# Patient Record
Sex: MALE | Race: WHITE | NOT HISPANIC OR LATINO | Employment: OTHER | URBAN - METROPOLITAN AREA
[De-identification: names, ages, dates, MRNs, and addresses within clinical notes are randomized per-mention and may not be internally consistent; named-entity substitution may affect disease eponyms.]

---

## 2017-01-18 ENCOUNTER — APPOINTMENT (OUTPATIENT)
Dept: GENERAL RADIOLOGY | Facility: HOSPITAL | Age: 72
End: 2017-01-18

## 2017-01-18 ENCOUNTER — HOSPITAL ENCOUNTER (EMERGENCY)
Facility: HOSPITAL | Age: 72
Discharge: HOME | End: 2017-01-18
Attending: EMERGENCY MEDICINE | Admitting: EMERGENCY MEDICINE

## 2017-01-18 VITALS
SYSTOLIC BLOOD PRESSURE: 120 MMHG | WEIGHT: 265 LBS | OXYGEN SATURATION: 96 % | HEIGHT: 75 IN | HEART RATE: 79 BPM | RESPIRATION RATE: 22 BRPM | TEMPERATURE: 98 F | BODY MASS INDEX: 32.95 KG/M2 | DIASTOLIC BLOOD PRESSURE: 63 MMHG

## 2017-01-18 DIAGNOSIS — I48.0 PAROXYSMAL ATRIAL FIBRILLATION (HCC): Primary | ICD-10-CM

## 2017-01-18 LAB
ALBUMIN SERPL-MCNC: 4 G/DL (ref 3.2–4.8)
ALBUMIN/GLOB SERPL: 1.3 G/DL (ref 1.5–2.5)
ALP SERPL-CCNC: 78 U/L (ref 25–100)
ALT SERPL W P-5'-P-CCNC: 31 U/L (ref 7–40)
ANION GAP SERPL CALCULATED.3IONS-SCNC: 7 MMOL/L (ref 3–11)
AST SERPL-CCNC: 28 U/L (ref 0–33)
BASOPHILS # BLD AUTO: 0.02 10*3/MM3 (ref 0–0.2)
BASOPHILS NFR BLD AUTO: 0.1 % (ref 0–1)
BILIRUB SERPL-MCNC: 1.8 MG/DL (ref 0.3–1.2)
BNP SERPL-MCNC: 204 PG/ML (ref 0–100)
BUN BLD-MCNC: 16 MG/DL (ref 9–23)
BUN/CREAT SERPL: 16 (ref 7–25)
CALCIUM SPEC-SCNC: 9.4 MG/DL (ref 8.7–10.4)
CHLORIDE SERPL-SCNC: 105 MMOL/L (ref 99–109)
CO2 SERPL-SCNC: 30 MMOL/L (ref 20–31)
CREAT BLD-MCNC: 1 MG/DL (ref 0.6–1.3)
DEPRECATED RDW RBC AUTO: 45.5 FL (ref 37–54)
EOSINOPHIL # BLD AUTO: 0.1 10*3/MM3 (ref 0.1–0.3)
EOSINOPHIL NFR BLD AUTO: 0.7 % (ref 0–3)
ERYTHROCYTE [DISTWIDTH] IN BLOOD BY AUTOMATED COUNT: 13.1 % (ref 11.3–14.5)
GFR SERPL CREATININE-BSD FRML MDRD: 74 ML/MIN/1.73
GLOBULIN UR ELPH-MCNC: 3 GM/DL
GLUCOSE BLD-MCNC: 98 MG/DL (ref 70–100)
HCT VFR BLD AUTO: 44.7 % (ref 38.9–50.9)
HGB BLD-MCNC: 15.3 G/DL (ref 13.1–17.5)
HOLD SPECIMEN: NORMAL
HOLD SPECIMEN: NORMAL
IMM GRANULOCYTES # BLD: 0.02 10*3/MM3 (ref 0–0.03)
IMM GRANULOCYTES NFR BLD: 0.1 % (ref 0–0.6)
LYMPHOCYTES # BLD AUTO: 0.81 10*3/MM3 (ref 0.6–4.8)
LYMPHOCYTES NFR BLD AUTO: 5.8 % (ref 24–44)
MAGNESIUM SERPL-MCNC: 2.1 MG/DL (ref 1.3–2.7)
MCH RBC QN AUTO: 32.2 PG (ref 27–31)
MCHC RBC AUTO-ENTMCNC: 34.2 G/DL (ref 32–36)
MCV RBC AUTO: 94.1 FL (ref 80–99)
MONOCYTES # BLD AUTO: 1.39 10*3/MM3 (ref 0–1)
MONOCYTES NFR BLD AUTO: 10 % (ref 0–12)
NEUTROPHILS # BLD AUTO: 11.6 10*3/MM3 (ref 1.5–8.3)
NEUTROPHILS NFR BLD AUTO: 83.3 % (ref 41–71)
PLATELET # BLD AUTO: 183 10*3/MM3 (ref 150–450)
PMV BLD AUTO: 10.1 FL (ref 6–12)
POTASSIUM BLD-SCNC: 4.1 MMOL/L (ref 3.5–5.5)
PROT SERPL-MCNC: 7 G/DL (ref 5.7–8.2)
RBC # BLD AUTO: 4.75 10*6/MM3 (ref 4.2–5.76)
SODIUM BLD-SCNC: 142 MMOL/L (ref 132–146)
TROPONIN I SERPL-MCNC: 0.02 NG/ML (ref 0–0.07)
WBC NRBC COR # BLD: 13.94 10*3/MM3 (ref 3.5–10.8)
WHOLE BLOOD HOLD SPECIMEN: NORMAL
WHOLE BLOOD HOLD SPECIMEN: NORMAL

## 2017-01-18 PROCEDURE — 93005 ELECTROCARDIOGRAM TRACING: CPT | Performed by: EMERGENCY MEDICINE

## 2017-01-18 PROCEDURE — 84484 ASSAY OF TROPONIN QUANT: CPT

## 2017-01-18 PROCEDURE — 80053 COMPREHEN METABOLIC PANEL: CPT | Performed by: EMERGENCY MEDICINE

## 2017-01-18 PROCEDURE — 83735 ASSAY OF MAGNESIUM: CPT | Performed by: EMERGENCY MEDICINE

## 2017-01-18 PROCEDURE — 71010 HC CHEST PA OR AP: CPT

## 2017-01-18 PROCEDURE — 92960 CARDIOVERSION ELECTRIC EXT: CPT | Performed by: EMERGENCY MEDICINE

## 2017-01-18 PROCEDURE — 36415 COLL VENOUS BLD VENIPUNCTURE: CPT

## 2017-01-18 PROCEDURE — 83880 ASSAY OF NATRIURETIC PEPTIDE: CPT | Performed by: EMERGENCY MEDICINE

## 2017-01-18 PROCEDURE — 99284 EMERGENCY DEPT VISIT MOD MDM: CPT

## 2017-01-18 PROCEDURE — 25010000002 PROPOFOL 10 MG/ML EMULSION: Performed by: EMERGENCY MEDICINE

## 2017-01-18 PROCEDURE — 85025 COMPLETE CBC W/AUTO DIFF WBC: CPT | Performed by: EMERGENCY MEDICINE

## 2017-01-18 RX ORDER — SODIUM CHLORIDE 0.9 % (FLUSH) 0.9 %
10 SYRINGE (ML) INJECTION AS NEEDED
Status: DISCONTINUED | OUTPATIENT
Start: 2017-01-18 | End: 2017-01-18 | Stop reason: HOSPADM

## 2017-01-18 RX ORDER — PROPOFOL 10 MG/ML
40 VIAL (ML) INTRAVENOUS ONCE
Status: COMPLETED | OUTPATIENT
Start: 2017-01-18 | End: 2017-01-18

## 2017-01-18 RX ADMIN — PROPOFOL 40 MG: 10 INJECTION, EMULSION INTRAVENOUS at 14:51

## 2017-01-18 NOTE — ED NOTES
CONSENT SIGNED BY FAMILY AND ON CHART. PT PLACED ON CAPNOGRAPHY. 1 LITER NORMAL SALINE HANGING AT BEDSIDE. ZOLL IN ROOM AND CARDIO PADS ATTACHED. DR SINCLAIR NOTIFIED.              Elif Fonseca RN  01/18/17 6350

## 2017-01-18 NOTE — ED PROVIDER NOTES
Subjective   HPI Comments: Mr. Arellano is a 71 y.o male who presents to the ED with an irregular heart beat starting last night. Pt notes that he became SOA shortly after waking up and noticed that his CPAP machine was disconnected . He went to his PCP earlier today and was told to come to the ED because he was in afib. He also complains of a cough starting yesterday but denies any fever, chills, congestions, N/V/D or any other acute sx at this time. He notes that these sx are consistent with episodes of afib in the past. He is currently on Xarelto.     Patient is a 71 y.o. male presenting with palpitations.   History provided by:  Patient  Palpitations   Palpitations quality:  Irregular  Timing:  Constant  Progression:  Unchanged  Chronicity:  New  Relieved by:  None tried  Worsened by:  Nothing  Ineffective treatments:  None tried  Associated symptoms: cough and shortness of breath    Associated symptoms: no nausea and no vomiting    Risk factors: hx of atrial fibrillation        Review of Systems   Constitutional: Negative for chills and fever.   HENT: Negative for congestion and rhinorrhea.    Respiratory: Positive for cough and shortness of breath.    Cardiovascular: Positive for palpitations.   Gastrointestinal: Negative for abdominal pain, diarrhea, nausea and vomiting.   All other systems reviewed and are negative.      Past Medical History   Diagnosis Date   • Benign hypertension    • Lower extremity edema    • Mild obesity    • PARKER (obstructive sleep apnea)      requiring CPAP   • Osteoarthritis    • Paroxysmal atrial fibrillation    • Tachy-krishna syndrome        Allergies   Allergen Reactions   • Naproxen      Patient states he has made changes on that       Past Surgical History   Procedure Laterality Date   • Cardioversion     • Nephrolithotripsy percutaneous         Family History   Problem Relation Age of Onset   • Hypertension Other    • Cancer Sister        Social History     Social History   •  Marital status:      Spouse name: N/A   • Number of children: N/A   • Years of education: N/A     Social History Main Topics   • Smoking status: Former Smoker   • Smokeless tobacco: None   • Alcohol use No   • Drug use: No   • Sexual activity: Defer     Other Topics Concern   • None     Social History Narrative         Objective   Physical Exam   Constitutional: He is oriented to person, place, and time. He appears well-developed and well-nourished. No distress.   HENT:   Head: Normocephalic and atraumatic.   Eyes: Conjunctivae are normal.   Neck: Normal range of motion. Neck supple. JVD (Mild) present.   Cardiovascular: Normal heart sounds and intact distal pulses.  An irregularly irregular rhythm present. Tachycardia present.    Pulmonary/Chest: Effort normal. No respiratory distress. He has rales (Left base).   Abdominal: Soft. There is no tenderness.   Musculoskeletal: Normal range of motion. He exhibits no edema.   Neurological: He is alert and oriented to person, place, and time.   Skin: Skin is warm and dry.   Psychiatric: He has a normal mood and affect. His behavior is normal.   Nursing note and vitals reviewed.      Electrical Cardioversion  Date/Time: 1/18/2017 2:31 PM  Performed by: MANUELA SINCLAIR  Authorized by: MANUELA SINCLAIR   Consent: Verbal consent obtained. Written consent obtained.  Risks and benefits: risks, benefits and alternatives were discussed  Consent given by: patient  Patient understanding: patient states understanding of the procedure being performed  Patient consent: the patient's understanding of the procedure matches consent given  Procedure consent: procedure consent matches procedure scheduled  Relevant documents: relevant documents present and verified  Imaging studies: imaging studies available  Required items: required blood products, implants, devices, and special equipment available  Patient identity confirmed: verbally with patient  Sedation:  Patient sedated:  yes  Sedatives: propofol  Sedation start date/time: 1/18/2017 2:48 PM  Vitals: Vital signs were monitored during sedation.    Cardioversion basis: elective  Pre-procedure rhythm: atrial fibrillation  Patient position: patient was placed in a supine position  Electrodes: pads  Electrodes placed: anterior-posterior  Number of attempts: 2  Attempt 1 mode: synchronous  Attempt 1 waveform: biphasic  Attempt 1 shock (in Joules): 120  Attempt 1 outcome: no change in rhythm  Attempt 2 mode: synchronous  Attempt 2 shock (in Joules): 150  Attempt 2 outcome: conversion to normal sinus rhythm  Post-procedure rhythm: normal sinus rhythm  Complications: no complications  Patient tolerance: Patient tolerated the procedure well with no immediate complications               ED Course  ED Course   Comment By Time   I spoke with Mr. Arellano and his wife.  I have paged Dr. WYATT who is on-call for cardiology Hong Rodriguez MD 01/18 1325   I spoke with Dr. Wyatt whp is going to call their office and have them call me back Hong Rodriguez MD 01/18 1331   Dr. Wyatt asked that I cardiovert Mr. Arellano.  I spoke with Mr. Arellano his wife about sedation and cardioversion, I discussed the risks of that.  They wish to proceed. Hong Rodriguez MD 01/18 1405   Mr. Arellano is now dressed awake and alert sitting on the end of his bed telling me he's ready to go home. Hong Rodriguez MD 01/18 1551                     MDM  Number of Diagnoses or Management Options  Paroxysmal atrial fibrillation: new and requires workup     Amount and/or Complexity of Data Reviewed  Clinical lab tests: ordered and reviewed  Tests in the radiology section of CPT®: ordered and reviewed  Review and summarize past medical records: yes  Discuss the patient with other providers: yes    Patient Progress  Patient progress: improved      Final diagnoses:   Paroxysmal atrial fibrillation       Documentation assistance provided by yury SAWANT.  Information  recorded by the scribe was done at my direction and has been verified and validated by me.     Tahir Mary  01/18/17 1219       Tahir Mary  01/18/17 1195       Hong Rodriguez MD  01/18/17 7678

## 2017-01-30 ENCOUNTER — OFFICE VISIT (OUTPATIENT)
Dept: CARDIOLOGY | Facility: CLINIC | Age: 72
End: 2017-01-30

## 2017-01-30 VITALS
HEART RATE: 55 BPM | BODY MASS INDEX: 32.08 KG/M2 | HEIGHT: 75 IN | SYSTOLIC BLOOD PRESSURE: 130 MMHG | WEIGHT: 258 LBS | DIASTOLIC BLOOD PRESSURE: 58 MMHG

## 2017-01-30 DIAGNOSIS — I48.0 PAROXYSMAL ATRIAL FIBRILLATION (HCC): ICD-10-CM

## 2017-01-30 DIAGNOSIS — I49.5 TACHY-BRADY SYNDROME (HCC): ICD-10-CM

## 2017-01-30 DIAGNOSIS — R60.0 BILATERAL EDEMA OF LOWER EXTREMITY: ICD-10-CM

## 2017-01-30 DIAGNOSIS — I10 BENIGN HYPERTENSION: Primary | ICD-10-CM

## 2017-01-30 PROCEDURE — 93000 ELECTROCARDIOGRAM COMPLETE: CPT | Performed by: PHYSICIAN ASSISTANT

## 2017-01-30 PROCEDURE — 99213 OFFICE O/P EST LOW 20 MIN: CPT | Performed by: PHYSICIAN ASSISTANT

## 2017-01-30 RX ORDER — GUAIFENESIN AND DEXTROMETHORPHAN HYDROBROMIDE 600; 30 MG/1; MG/1
1 TABLET, EXTENDED RELEASE ORAL 2 TIMES DAILY PRN
COMMUNITY
End: 2017-08-28

## 2017-01-30 NOTE — LETTER
January 30, 2017     Saleem Yang MD  [address] [place] [address] [address]  [place] [address]    Patient: Vlad Arellano   YOB: 1945   Date of Visit: 1/30/2017       Dear Dr. Celia MD:    Thank you for referring Vlad Arellano to me for evaluation. Below are the relevant portions of my assessment and plan of care.    If you have questions, please do not hesitate to call me. I look forward to following Vlad along with you.         Sincerely,        JAYDEN Mayorga        CC: No Recipients  JYADEN Mayorga  1/30/2017  9:42 AM  Signed       [facility] - Office Note  Vlad Arellano         [address] [place] [address]  1945   [phone] (home) [phone] (work)     LOCATION:  [facility].  Visit Type: Follow Up.    PCP:  Saleem Yang MD    01/30/17   Vlad Arellano is a 71 y.o.  male  retired.      Chief Complaint: Follow-up on paroxysmal atrial fibrillation, htn.  PROBLEM LIST:  1. Paroxysmal atrial fibrillation:  a. Diagnosed 4 years ago with external cardioversion x3.  b. CHADS-Vasc = 2.  On Xarelto.  c. Most recent external cardioversion on Multaq, 04/21/2016. Successful conversion to sinus rhythm. Cessation of Multaq and initiation of sotalol with hospitalization.  d. ER visit 1/18/17 for AFib with RVR.  2. Tachy-krishna syndrome.   3. Benign hypertension.   4. Lower extremity edema.   5. Obstructive sleep apnea requiring CPAP.   6. Osteoarthritis.   7. Mild obesity.   8. Family history of hypertension.   9. Surgical history:   a. External cardioversion x3.  b. Lithotripsy.      Allergies   Allergen Reactions   • Naproxen      Patient states he has made changes on that         Current Outpatient Prescriptions:   •  dronedarone (MULTAQ) 400 MG tablet, Take 400 mg by mouth 2 (two) times a day with meals., Disp: , Rfl:   •  guaifenesin-dextromethorphan (MUCINEX DM)  MG tablet sustained-release 12 hour tablet, Take 1  "tablet by mouth 2 (Two) Times a Day As Needed., Disp: , Rfl:   •  meloxicam (MOBIC) 7.5 MG tablet, Take 1 tablet by mouth daily., Disp: 30 tablet, Rfl: 6  •  naproxen sodium (ALEVE) 220 MG tablet, Take 220 mg by mouth 2 (two) times a day as needed for mild pain (1-3)., Disp: , Rfl:   •  rivaroxaban (XARELTO) 20 MG tablet, Take 20 mg by mouth daily., Disp: , Rfl:   •  torsemide (DEMADEX) 20 MG tablet, Take 20 mg by mouth daily., Disp: , Rfl:     HPI  Mr. Arellano is here today for routine follow-up on his atrial fibrillation, hypertension and tachybradycardia.Since his last visit with us, he was in the emergency room 2 weeks ago with recurrent A. fib.  He's been dealing with a cold for 2 weeks which includes upper respiratory issues such as a cough, sputum production and fatigue.  He also states the day he went in to see his primary care for antibiotics, he had woken up without his mask on.  He believes the combination of these 2 caused his A. fib.  He was asymptomatic otherwise.  He has been compliant with his medications including his multivitamins and Xarelto.  He denies any bleeding issues.  There've been no complaints of chest pain, no dizziness, no stroke or TIA-like symptoms.    The following portions of the patient's history were reviewed in the chart and updated as appropriate: allergies, current medications, past family history, past medical history, past social history, past surgical history and problem list.    Review of Systems   Respiratory: Positive for cough, shortness of breath and sputum production.    All other systems reviewed and are negative.        height is 75\" (190.5 cm) and weight is 258 lb (117 kg). His blood pressure is 130/58 and his pulse is 55.   Physical Exam   Constitutional: Vital signs are normal. He appears well-developed and well-nourished.   Cardiovascular: Normal rate, regular rhythm, S1 normal, S2 normal, normal heart sounds, intact distal pulses and normal pulses.  "   Pulmonary/Chest: Effort normal and breath sounds normal. He has no wheezes. He has no rhonchi. He has no rales.   Abdominal: Soft. Normal appearance and bowel sounds are normal. There is no hepatosplenomegaly.   Neurological: He is alert.           ECG 12 Lead  Date/Time: 1/30/2017 9:07 AM  Performed by: ZOYA ALARCON  Authorized by: ZOYA ALARCON   Comparison: compared with previous ECG from 1/18/2017  Comparison to previous ECG: Back in NSR.  Rhythm: sinus bradycardia  Rate: bradycardic  QRS axis: normal  Clinical impression: normal ECG             Assessment/ Plan   Benign hypertension:  Well controlled.  Continue current medical therapy.    Bilateral edema of lower extremity:  Stable and controlled.  Continue to monitor.    Tachy-krishna syndrome/PAF:   I tried to discuss alternative antiarrhythmics with Mr. Arellano along with possible future ablation.  At this time he is declining ablation but is open to information on other antiarrhythmics.  I do believe that a combination of his mask coming off and a cold exacerbated his A. fib.  He is maintaining sinus rhythm today with rate control.  I will continue with current medical regimen which includes Xarelto and Multaq.  Return to clinic 6 months with EKG or sooner when necessary.  If he has a recurrent event before then, I will schedule him an appointment with EP if Dr. Kwong has not returned.  Otherwise Dr. Kwong can discuss Tikosyn initiation in the future.      Zoya Alarcon PA-C

## 2017-01-30 NOTE — PROGRESS NOTES
[facility] - Office Note  Vlad Arellano         [address] [place] [address]  1945   [phone] (home) [phone] (work)     LOCATION:  [place] office.  Visit Type: Follow Up.    PCP:  Saleem Yang MD    01/30/17   Vlad Arellano is a 71 y.o.  male  retired.      Chief Complaint: Follow-up on paroxysmal atrial fibrillation, htn.  PROBLEM LIST:  1. Paroxysmal atrial fibrillation:  a. Diagnosed 4 years ago with external cardioversion x3.  b. CHADS-Vasc = 2.  On Xarelto.  c. Most recent external cardioversion on Multaq, 04/21/2016. Successful conversion to sinus rhythm. Cessation of Multaq and initiation of sotalol with hospitalization.  d. ER visit 1/18/17 for AFib with RVR.  2. Tachy-krishna syndrome.   3. Benign hypertension.   4. Lower extremity edema.   5. Obstructive sleep apnea requiring CPAP.   6. Osteoarthritis.   7. Mild obesity.   8. Family history of hypertension.   9. Surgical history:   a. External cardioversion x3.  b. Lithotripsy.      Allergies   Allergen Reactions   • Naproxen      Patient states he has made changes on that         Current Outpatient Prescriptions:   •  dronedarone (MULTAQ) 400 MG tablet, Take 400 mg by mouth 2 (two) times a day with meals., Disp: , Rfl:   •  guaifenesin-dextromethorphan (MUCINEX DM)  MG tablet sustained-release 12 hour tablet, Take 1 tablet by mouth 2 (Two) Times a Day As Needed., Disp: , Rfl:   •  meloxicam (MOBIC) 7.5 MG tablet, Take 1 tablet by mouth daily., Disp: 30 tablet, Rfl: 6  •  naproxen sodium (ALEVE) 220 MG tablet, Take 220 mg by mouth 2 (two) times a day as needed for mild pain (1-3)., Disp: , Rfl:   •  rivaroxaban (XARELTO) 20 MG tablet, Take 20 mg by mouth daily., Disp: , Rfl:   •  torsemide (DEMADEX) 20 MG tablet, Take 20 mg by mouth daily., Disp: , Rfl:     HPI  Mr. Arellano is here today for routine follow-up on his atrial fibrillation, hypertension and  "tachybradycardia.Since his last visit with us, he was in the emergency room 2 weeks ago with recurrent A. fib.  He's been dealing with a cold for 2 weeks which includes upper respiratory issues such as a cough, sputum production and fatigue.  He also states the day he went in to see his primary care for antibiotics, he had woken up without his mask on.  He believes the combination of these 2 caused his A. fib.  He was asymptomatic otherwise.  He has been compliant with his medications including his multivitamins and Xarelto.  He denies any bleeding issues.  There've been no complaints of chest pain, no dizziness, no stroke or TIA-like symptoms.    The following portions of the patient's history were reviewed in the chart and updated as appropriate: allergies, current medications, past family history, past medical history, past social history, past surgical history and problem list.    Review of Systems   Respiratory: Positive for cough, shortness of breath and sputum production.    All other systems reviewed and are negative.        height is 75\" (190.5 cm) and weight is 258 lb (117 kg). His blood pressure is 130/58 and his pulse is 55.   Physical Exam   Constitutional: Vital signs are normal. He appears well-developed and well-nourished.   Cardiovascular: Normal rate, regular rhythm, S1 normal, S2 normal, normal heart sounds, intact distal pulses and normal pulses.    Pulmonary/Chest: Effort normal and breath sounds normal. He has no wheezes. He has no rhonchi. He has no rales.   Abdominal: Soft. Normal appearance and bowel sounds are normal. There is no hepatosplenomegaly.   Neurological: He is alert.           ECG 12 Lead  Date/Time: 1/30/2017 9:07 AM  Performed by: TONE ALARCON  Authorized by: TONE ALARCON   Comparison: compared with previous ECG from 1/18/2017  Comparison to previous ECG: Back in NSR.  Rhythm: sinus bradycardia  Rate: bradycardic  QRS axis: normal  Clinical impression: normal " ECG             Assessment/ Plan   Benign hypertension:  Well controlled.  Continue current medical therapy.    Bilateral edema of lower extremity:  Stable and controlled.  Continue to monitor.    Tachy-krishna syndrome/PAF:   I tried to discuss alternative antiarrhythmics with Mr. Arellano along with possible future ablation.  At this time he is declining ablation but is open to information on other antiarrhythmics.  I do believe that a combination of his mask coming off and a cold exacerbated his A. fib.  He is maintaining sinus rhythm today with rate control.  I will continue with current medical regimen which includes Xarelto and Multaq.  Return to clinic 6 months with EKG or sooner when necessary.  If he has a recurrent event before then, I will schedule him an appointment with EP if Dr. Kwong has not returned.  Otherwise Dr. Kwong can discuss Tikosyn initiation in the future.      Zoya Lopes PA-C

## 2017-07-15 ENCOUNTER — APPOINTMENT (OUTPATIENT)
Dept: GENERAL RADIOLOGY | Facility: HOSPITAL | Age: 72
End: 2017-07-15

## 2017-07-15 ENCOUNTER — HOSPITAL ENCOUNTER (EMERGENCY)
Facility: HOSPITAL | Age: 72
Discharge: HOME | End: 2017-07-15
Attending: EMERGENCY MEDICINE | Admitting: EMERGENCY MEDICINE

## 2017-07-15 VITALS
SYSTOLIC BLOOD PRESSURE: 125 MMHG | OXYGEN SATURATION: 98 % | HEIGHT: 75 IN | DIASTOLIC BLOOD PRESSURE: 73 MMHG | HEART RATE: 66 BPM | BODY MASS INDEX: 31.58 KG/M2 | WEIGHT: 254 LBS | TEMPERATURE: 97.9 F | RESPIRATION RATE: 16 BRPM

## 2017-07-15 DIAGNOSIS — S80.811A LEG ABRASION, RIGHT, INITIAL ENCOUNTER: ICD-10-CM

## 2017-07-15 DIAGNOSIS — S00.01XA SCALP ABRASION, INITIAL ENCOUNTER: ICD-10-CM

## 2017-07-15 DIAGNOSIS — S80.01XA CONTUSION OF RIGHT KNEE, INITIAL ENCOUNTER: Primary | ICD-10-CM

## 2017-07-15 DIAGNOSIS — S83.8X1A SPRAIN OF OTHER LIGAMENT OF RIGHT KNEE, INITIAL ENCOUNTER: ICD-10-CM

## 2017-07-15 PROCEDURE — 90471 IMMUNIZATION ADMIN: CPT | Performed by: NURSE PRACTITIONER

## 2017-07-15 PROCEDURE — 90715 TDAP VACCINE 7 YRS/> IM: CPT | Performed by: NURSE PRACTITIONER

## 2017-07-15 PROCEDURE — 25010000002 TDAP 5-2.5-18.5 LF-MCG/0.5 SUSPENSION: Performed by: NURSE PRACTITIONER

## 2017-07-15 PROCEDURE — 73552 X-RAY EXAM OF FEMUR 2/>: CPT

## 2017-07-15 PROCEDURE — 73560 X-RAY EXAM OF KNEE 1 OR 2: CPT

## 2017-07-15 PROCEDURE — 99283 EMERGENCY DEPT VISIT LOW MDM: CPT

## 2017-07-15 RX ORDER — HYDROCODONE BITARTRATE AND ACETAMINOPHEN 5; 325 MG/1; MG/1
1-2 TABLET ORAL EVERY 6 HOURS PRN
Qty: 15 TABLET | Refills: 0 | Status: SHIPPED | OUTPATIENT
Start: 2017-07-15 | End: 2017-08-21 | Stop reason: HOSPADM

## 2017-07-15 RX ADMIN — TETANUS TOXOID, REDUCED DIPHTHERIA TOXOID AND ACELLULAR PERTUSSIS VACCINE, ADSORBED 0.5 ML: 5; 2.5; 8; 8; 2.5 SUSPENSION INTRAMUSCULAR at 10:04
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
## 2017-08-14 ENCOUNTER — TELEPHONE (OUTPATIENT)
Dept: CARDIOLOGY | Facility: CLINIC | Age: 72
End: 2017-08-14

## 2017-08-14 NOTE — TELEPHONE ENCOUNTER
([phone]) called and stated he plans on removing fluid from patients large hematoma on his knee in the next week. wants to know how long patient is to be off his xarelto. Called  back and told him patient can be off xarelto for 3 days per Yen Lopes request.  wanted a longer time for patient to be off xarelto. In clinic with Yen Lopes and Yen talked to  and told him it was ok to be off xarelto for 5 days before the procedure and patient could be on a baby asa while off the xarelto at  request. Also,  was going to start patient on Eliquis post procedure instead of xarelto.  was going to call patient and tell him this information along with taking care of the eliquis post procedure per Yen Lopes's conversation with .

## 2017-08-14 NOTE — TELEPHONE ENCOUNTER
Patient called and wanted to know if he needed to stop his xarelto tonight. Instructed him that  called earlier today and discussed his upcoming procedure and xarelto therapy with Yen CARPENTER and that he would be calling the patient with the specific instructions. Verbalized understanding.

## 2017-08-15 ENCOUNTER — TELEPHONE (OUTPATIENT)
Dept: CARDIOLOGY | Facility: CLINIC | Age: 72
End: 2017-08-15

## 2017-08-15 NOTE — TELEPHONE ENCOUNTER
Ramona from  office called and stated she called patient with pre procedure instructions as discussed with Yen CARPENTER and  yesterday. Patients spouse ,Rafaela was not receptive to her instructions. Ramona wants a RX faxed to [phone] stating to hold  xarelto 5 days before procedure, exact date when to start baby asa until procedure and to  starting eliquis after procedure instead of xarelto. Procedure  is scheduled for August 24,2017. Ramona's # [phone].Please advise.

## 2017-08-17 NOTE — TELEPHONE ENCOUNTER
Called Ramona at 's office and told her instructions to give patient from Yen Lopes PA were faxed per her request.

## 2017-08-17 NOTE — TELEPHONE ENCOUNTER
That's fine.  Write order to hold per our instructions.  ASA to start day after stopping Xarelto.  Start Eliquis 5mg po bid immediately following surgery - I'm sure DR. SCHMID will advise on this so the Eliquis should start per his instructions.  I would write that on the note.    i've attached Kaylynn because this is her uncle.

## 2017-08-18 ENCOUNTER — ANESTHESIA (OUTPATIENT)
Dept: PERIOP | Facility: HOSPITAL | Age: 72
End: 2017-08-18

## 2017-08-18 ENCOUNTER — ANESTHESIA EVENT (OUTPATIENT)
Dept: PERIOP | Facility: HOSPITAL | Age: 72
End: 2017-08-18

## 2017-08-18 ENCOUNTER — HOSPITAL ENCOUNTER (INPATIENT)
Facility: HOSPITAL | Age: 72
LOS: 3 days | Discharge: HOME | End: 2017-08-21
Attending: ORTHOPAEDIC SURGERY | Admitting: ORTHOPAEDIC SURGERY

## 2017-08-18 DIAGNOSIS — Z98.890 S/P DEBRIDEMENT: Primary | ICD-10-CM

## 2017-08-18 DIAGNOSIS — R52 PAIN: ICD-10-CM

## 2017-08-18 PROBLEM — S70.11XA HEMATOMA OF RIGHT THIGH: Status: ACTIVE | Noted: 2017-08-18

## 2017-08-18 LAB
ANION GAP SERPL CALCULATED.3IONS-SCNC: 9 MMOL/L (ref 3–11)
BUN BLD-MCNC: 17 MG/DL (ref 9–23)
BUN/CREAT SERPL: 21.3 (ref 7–25)
CALCIUM SPEC-SCNC: 8.8 MG/DL (ref 8.7–10.4)
CHLORIDE SERPL-SCNC: 100 MMOL/L (ref 99–109)
CO2 SERPL-SCNC: 27 MMOL/L (ref 20–31)
CREAT BLD-MCNC: 0.8 MG/DL (ref 0.6–1.3)
DEPRECATED RDW RBC AUTO: 46.8 FL (ref 37–54)
ERYTHROCYTE [DISTWIDTH] IN BLOOD BY AUTOMATED COUNT: 13.4 % (ref 11.3–14.5)
GFR SERPL CREATININE-BSD FRML MDRD: 95 ML/MIN/1.73
GLUCOSE BLD-MCNC: 93 MG/DL (ref 70–100)
HCT VFR BLD AUTO: 36.3 % (ref 38.9–50.9)
HGB BLD-MCNC: 12 G/DL (ref 13.1–17.5)
MCH RBC QN AUTO: 31.7 PG (ref 27–31)
MCHC RBC AUTO-ENTMCNC: 33.1 G/DL (ref 32–36)
MCV RBC AUTO: 95.8 FL (ref 80–99)
PLATELET # BLD AUTO: 199 10*3/MM3 (ref 150–450)
PMV BLD AUTO: 9 FL (ref 6–12)
POTASSIUM BLD-SCNC: 3.9 MMOL/L (ref 3.5–5.5)
RBC # BLD AUTO: 3.79 10*6/MM3 (ref 4.2–5.76)
SODIUM BLD-SCNC: 136 MMOL/L (ref 132–146)
WBC NRBC COR # BLD: 6.73 10*3/MM3 (ref 3.5–10.8)

## 2017-08-18 PROCEDURE — 25010000002 PROPOFOL 10 MG/ML EMULSION: Performed by: NURSE ANESTHETIST, CERTIFIED REGISTERED

## 2017-08-18 PROCEDURE — 87147 CULTURE TYPE IMMUNOLOGIC: CPT | Performed by: ORTHOPAEDIC SURGERY

## 2017-08-18 PROCEDURE — 87176 TISSUE HOMOGENIZATION CULTR: CPT | Performed by: ORTHOPAEDIC SURGERY

## 2017-08-18 PROCEDURE — 25010000003 CEFAZOLIN IN DEXTROSE 2-4 GM/100ML-% SOLUTION: Performed by: NURSE ANESTHETIST, CERTIFIED REGISTERED

## 2017-08-18 PROCEDURE — 93005 ELECTROCARDIOGRAM TRACING: CPT | Performed by: ORTHOPAEDIC SURGERY

## 2017-08-18 PROCEDURE — 87070 CULTURE OTHR SPECIMN AEROBIC: CPT | Performed by: ORTHOPAEDIC SURGERY

## 2017-08-18 PROCEDURE — 0JDN0ZZ EXTRACTION OF RIGHT LOWER LEG SUBCUTANEOUS TISSUE AND FASCIA, OPEN APPROACH: ICD-10-PCS | Performed by: ORTHOPAEDIC SURGERY

## 2017-08-18 PROCEDURE — 87205 SMEAR GRAM STAIN: CPT | Performed by: ORTHOPAEDIC SURGERY

## 2017-08-18 PROCEDURE — 63710000001 POVIDONE-IODINE 10 % SOLUTION 30 ML BOTTLE: Performed by: ORTHOPAEDIC SURGERY

## 2017-08-18 PROCEDURE — 87077 CULTURE AEROBIC IDENTIFY: CPT | Performed by: ORTHOPAEDIC SURGERY

## 2017-08-18 PROCEDURE — 85027 COMPLETE CBC AUTOMATED: CPT | Performed by: ORTHOPAEDIC SURGERY

## 2017-08-18 PROCEDURE — 87186 SC STD MICRODIL/AGAR DIL: CPT | Performed by: ORTHOPAEDIC SURGERY

## 2017-08-18 PROCEDURE — 87075 CULTR BACTERIA EXCEPT BLOOD: CPT | Performed by: ORTHOPAEDIC SURGERY

## 2017-08-18 PROCEDURE — 80048 BASIC METABOLIC PNL TOTAL CA: CPT | Performed by: ORTHOPAEDIC SURGERY

## 2017-08-18 PROCEDURE — 93010 ELECTROCARDIOGRAM REPORT: CPT | Performed by: INTERNAL MEDICINE

## 2017-08-18 PROCEDURE — 25010000002 FENTANYL CITRATE (PF) 100 MCG/2ML SOLUTION: Performed by: NURSE ANESTHETIST, CERTIFIED REGISTERED

## 2017-08-18 PROCEDURE — A9270 NON-COVERED ITEM OR SERVICE: HCPCS | Performed by: ORTHOPAEDIC SURGERY

## 2017-08-18 RX ORDER — ZOLPIDEM TARTRATE 5 MG/1
5 TABLET ORAL NIGHTLY PRN
Status: DISCONTINUED | OUTPATIENT
Start: 2017-08-18 | End: 2017-08-21 | Stop reason: HOSPADM

## 2017-08-18 RX ORDER — HYDROMORPHONE HYDROCHLORIDE 1 MG/ML
0.5 INJECTION, SOLUTION INTRAMUSCULAR; INTRAVENOUS; SUBCUTANEOUS
Status: DISCONTINUED | OUTPATIENT
Start: 2017-08-18 | End: 2017-08-18 | Stop reason: HOSPADM

## 2017-08-18 RX ORDER — MORPHINE SULFATE 2 MG/ML
1 INJECTION, SOLUTION INTRAMUSCULAR; INTRAVENOUS EVERY 4 HOURS PRN
Status: DISCONTINUED | OUTPATIENT
Start: 2017-08-18 | End: 2017-08-21 | Stop reason: HOSPADM

## 2017-08-18 RX ORDER — SODIUM CHLORIDE 0.9 % (FLUSH) 0.9 %
1-10 SYRINGE (ML) INJECTION AS NEEDED
Status: DISCONTINUED | OUTPATIENT
Start: 2017-08-18 | End: 2017-08-18 | Stop reason: HOSPADM

## 2017-08-18 RX ORDER — LIDOCAINE HYDROCHLORIDE 10 MG/ML
0.5 INJECTION, SOLUTION EPIDURAL; INFILTRATION; INTRACAUDAL; PERINEURAL ONCE AS NEEDED
Status: DISCONTINUED | OUTPATIENT
Start: 2017-08-18 | End: 2017-08-18 | Stop reason: HOSPADM

## 2017-08-18 RX ORDER — PROPOFOL 10 MG/ML
VIAL (ML) INTRAVENOUS AS NEEDED
Status: DISCONTINUED | OUTPATIENT
Start: 2017-08-18 | End: 2017-08-18 | Stop reason: SURG

## 2017-08-18 RX ORDER — FAMOTIDINE 10 MG/ML
20 INJECTION, SOLUTION INTRAVENOUS ONCE
Status: COMPLETED | OUTPATIENT
Start: 2017-08-18 | End: 2017-08-18

## 2017-08-18 RX ORDER — PROMETHAZINE HYDROCHLORIDE 25 MG/1
25 TABLET ORAL ONCE AS NEEDED
Status: DISCONTINUED | OUTPATIENT
Start: 2017-08-18 | End: 2017-08-18 | Stop reason: HOSPADM

## 2017-08-18 RX ORDER — CEFAZOLIN SODIUM 2 G/100ML
INJECTION, SOLUTION INTRAVENOUS AS NEEDED
Status: DISCONTINUED | OUTPATIENT
Start: 2017-08-18 | End: 2017-08-18 | Stop reason: SURG

## 2017-08-18 RX ORDER — PROMETHAZINE HYDROCHLORIDE 25 MG/ML
12.5 INJECTION, SOLUTION INTRAMUSCULAR; INTRAVENOUS EVERY 4 HOURS PRN
Status: DISCONTINUED | OUTPATIENT
Start: 2017-08-18 | End: 2017-08-21 | Stop reason: HOSPADM

## 2017-08-18 RX ORDER — CEFAZOLIN SODIUM 2 G/100ML
2 INJECTION, SOLUTION INTRAVENOUS EVERY 8 HOURS
Status: COMPLETED | OUTPATIENT
Start: 2017-08-19 | End: 2017-08-19

## 2017-08-18 RX ORDER — LIDOCAINE HYDROCHLORIDE 10 MG/ML
INJECTION, SOLUTION EPIDURAL; INFILTRATION; INTRACAUDAL; PERINEURAL AS NEEDED
Status: DISCONTINUED | OUTPATIENT
Start: 2017-08-18 | End: 2017-08-18 | Stop reason: SURG

## 2017-08-18 RX ORDER — SODIUM CHLORIDE 0.9 % (FLUSH) 0.9 %
1-10 SYRINGE (ML) INJECTION AS NEEDED
Status: DISCONTINUED | OUTPATIENT
Start: 2017-08-18 | End: 2017-08-21 | Stop reason: HOSPADM

## 2017-08-18 RX ORDER — OXYCODONE HYDROCHLORIDE AND ACETAMINOPHEN 5; 325 MG/1; MG/1
1 TABLET ORAL EVERY 4 HOURS PRN
Status: DISCONTINUED | OUTPATIENT
Start: 2017-08-18 | End: 2017-08-21 | Stop reason: HOSPADM

## 2017-08-18 RX ORDER — SODIUM CHLORIDE 9 MG/ML
100 INJECTION, SOLUTION INTRAVENOUS CONTINUOUS
Status: DISCONTINUED | OUTPATIENT
Start: 2017-08-18 | End: 2017-08-21 | Stop reason: HOSPADM

## 2017-08-18 RX ORDER — PROMETHAZINE HYDROCHLORIDE 25 MG/1
25 SUPPOSITORY RECTAL ONCE AS NEEDED
Status: DISCONTINUED | OUTPATIENT
Start: 2017-08-18 | End: 2017-08-18 | Stop reason: HOSPADM

## 2017-08-18 RX ORDER — PROCHLORPERAZINE 25 MG
25 SUPPOSITORY, RECTAL RECTAL EVERY 12 HOURS PRN
Status: DISCONTINUED | OUTPATIENT
Start: 2017-08-18 | End: 2017-08-21 | Stop reason: HOSPADM

## 2017-08-18 RX ORDER — HYDRALAZINE HYDROCHLORIDE 20 MG/ML
10 INJECTION INTRAMUSCULAR; INTRAVENOUS EVERY 6 HOURS PRN
Status: DISCONTINUED | OUTPATIENT
Start: 2017-08-18 | End: 2017-08-21 | Stop reason: HOSPADM

## 2017-08-18 RX ORDER — FENTANYL CITRATE 50 UG/ML
50 INJECTION, SOLUTION INTRAMUSCULAR; INTRAVENOUS
Status: DISCONTINUED | OUTPATIENT
Start: 2017-08-18 | End: 2017-08-18 | Stop reason: HOSPADM

## 2017-08-18 RX ORDER — PROMETHAZINE HYDROCHLORIDE 25 MG/ML
6.25 INJECTION, SOLUTION INTRAMUSCULAR; INTRAVENOUS ONCE AS NEEDED
Status: DISCONTINUED | OUTPATIENT
Start: 2017-08-18 | End: 2017-08-18 | Stop reason: HOSPADM

## 2017-08-18 RX ORDER — GLYCOPYRROLATE 0.2 MG/ML
INJECTION INTRAMUSCULAR; INTRAVENOUS AS NEEDED
Status: DISCONTINUED | OUTPATIENT
Start: 2017-08-18 | End: 2017-08-18 | Stop reason: SURG

## 2017-08-18 RX ORDER — PROCHLORPERAZINE MALEATE 5 MG/1
5 TABLET ORAL EVERY 6 HOURS PRN
Status: DISCONTINUED | OUTPATIENT
Start: 2017-08-18 | End: 2017-08-21 | Stop reason: HOSPADM

## 2017-08-18 RX ORDER — NALOXONE HCL 0.4 MG/ML
0.4 VIAL (ML) INJECTION
Status: DISCONTINUED | OUTPATIENT
Start: 2017-08-18 | End: 2017-08-21 | Stop reason: HOSPADM

## 2017-08-18 RX ORDER — FAMOTIDINE 20 MG/1
20 TABLET, FILM COATED ORAL ONCE
Status: DISCONTINUED | OUTPATIENT
Start: 2017-08-18 | End: 2017-08-18 | Stop reason: HOSPADM

## 2017-08-18 RX ORDER — SODIUM CHLORIDE, SODIUM LACTATE, POTASSIUM CHLORIDE, CALCIUM CHLORIDE 600; 310; 30; 20 MG/100ML; MG/100ML; MG/100ML; MG/100ML
9 INJECTION, SOLUTION INTRAVENOUS CONTINUOUS
Status: DISCONTINUED | OUTPATIENT
Start: 2017-08-18 | End: 2017-08-19

## 2017-08-18 RX ORDER — FENTANYL CITRATE 50 UG/ML
INJECTION, SOLUTION INTRAMUSCULAR; INTRAVENOUS AS NEEDED
Status: DISCONTINUED | OUTPATIENT
Start: 2017-08-18 | End: 2017-08-18 | Stop reason: SURG

## 2017-08-18 RX ADMIN — CEFAZOLIN SODIUM 2 G: 2 INJECTION, SOLUTION INTRAVENOUS at 23:30

## 2017-08-18 RX ADMIN — SODIUM CHLORIDE 100 ML/HR: 9 INJECTION, SOLUTION INTRAVENOUS at 18:09

## 2017-08-18 RX ADMIN — LIDOCAINE HYDROCHLORIDE 100 MG: 10 INJECTION, SOLUTION EPIDURAL; INFILTRATION; INTRACAUDAL; PERINEURAL at 16:09

## 2017-08-18 RX ADMIN — SODIUM CHLORIDE, POTASSIUM CHLORIDE, SODIUM LACTATE AND CALCIUM CHLORIDE: 600; 310; 30; 20 INJECTION, SOLUTION INTRAVENOUS at 16:44

## 2017-08-18 RX ADMIN — FAMOTIDINE 20 MG: 10 INJECTION, SOLUTION INTRAVENOUS at 15:58

## 2017-08-18 RX ADMIN — PROPOFOL 200 MG: 10 INJECTION, EMULSION INTRAVENOUS at 16:09

## 2017-08-18 RX ADMIN — DRONEDARONE 400 MG: 400 TABLET, FILM COATED ORAL at 20:48

## 2017-08-18 RX ADMIN — FENTANYL CITRATE 100 MCG: 50 INJECTION, SOLUTION INTRAMUSCULAR; INTRAVENOUS at 16:09

## 2017-08-18 RX ADMIN — SODIUM CHLORIDE, POTASSIUM CHLORIDE, SODIUM LACTATE AND CALCIUM CHLORIDE: 600; 310; 30; 20 INJECTION, SOLUTION INTRAVENOUS at 16:02

## 2017-08-18 RX ADMIN — GLYCOPYRROLATE 0.2 MG: 0.2 INJECTION, SOLUTION INTRAMUSCULAR; INTRAVENOUS at 16:23

## 2017-08-18 RX ADMIN — PROPOFOL 30 MG: 10 INJECTION, EMULSION INTRAVENOUS at 16:19

## 2017-08-18 RX ADMIN — PROPOFOL 30 MG: 10 INJECTION, EMULSION INTRAVENOUS at 16:20

## 2017-08-18 RX ADMIN — CEFAZOLIN SODIUM 2 G: 2 INJECTION, SOLUTION INTRAVENOUS at 16:15

## 2017-08-19 LAB
ANION GAP SERPL CALCULATED.3IONS-SCNC: 4 MMOL/L (ref 3–11)
BUN BLD-MCNC: 17 MG/DL (ref 9–23)
BUN/CREAT SERPL: 18.9 (ref 7–25)
CALCIUM SPEC-SCNC: 8.5 MG/DL (ref 8.7–10.4)
CHLORIDE SERPL-SCNC: 101 MMOL/L (ref 99–109)
CO2 SERPL-SCNC: 29 MMOL/L (ref 20–31)
CREAT BLD-MCNC: 0.9 MG/DL (ref 0.6–1.3)
DEPRECATED RDW RBC AUTO: 48.1 FL (ref 37–54)
ERYTHROCYTE [DISTWIDTH] IN BLOOD BY AUTOMATED COUNT: 13.5 % (ref 11.3–14.5)
GFR SERPL CREATININE-BSD FRML MDRD: 83 ML/MIN/1.73
GLUCOSE BLD-MCNC: 104 MG/DL (ref 70–100)
HCT VFR BLD AUTO: 33.9 % (ref 38.9–50.9)
HGB BLD-MCNC: 11.1 G/DL (ref 13.1–17.5)
MCH RBC QN AUTO: 31.7 PG (ref 27–31)
MCHC RBC AUTO-ENTMCNC: 32.7 G/DL (ref 32–36)
MCV RBC AUTO: 96.9 FL (ref 80–99)
PLATELET # BLD AUTO: 204 10*3/MM3 (ref 150–450)
PMV BLD AUTO: 9.5 FL (ref 6–12)
POTASSIUM BLD-SCNC: 4.3 MMOL/L (ref 3.5–5.5)
RBC # BLD AUTO: 3.5 10*6/MM3 (ref 4.2–5.76)
SODIUM BLD-SCNC: 134 MMOL/L (ref 132–146)
WBC NRBC COR # BLD: 5.43 10*3/MM3 (ref 3.5–10.8)

## 2017-08-19 PROCEDURE — 85027 COMPLETE CBC AUTOMATED: CPT | Performed by: NURSE PRACTITIONER

## 2017-08-19 PROCEDURE — 25010000003 CEFAZOLIN IN DEXTROSE 2-4 GM/100ML-% SOLUTION: Performed by: ORTHOPAEDIC SURGERY

## 2017-08-19 PROCEDURE — 80048 BASIC METABOLIC PNL TOTAL CA: CPT | Performed by: NURSE PRACTITIONER

## 2017-08-19 PROCEDURE — 25010000003 CEFTRIAXONE PER 250 MG: Performed by: NURSE PRACTITIONER

## 2017-08-19 RX ORDER — CEFTRIAXONE SODIUM 2 G/50ML
2 INJECTION, SOLUTION INTRAVENOUS EVERY 24 HOURS
Status: DISCONTINUED | OUTPATIENT
Start: 2017-08-19 | End: 2017-08-21 | Stop reason: HOSPADM

## 2017-08-19 RX ADMIN — SERTRALINE HYDROCHLORIDE 50 MG: 50 TABLET ORAL at 09:02

## 2017-08-19 RX ADMIN — CEFAZOLIN SODIUM 2 G: 2 INJECTION, SOLUTION INTRAVENOUS at 09:02

## 2017-08-19 RX ADMIN — CEFAZOLIN SODIUM 2 G: 2 INJECTION, SOLUTION INTRAVENOUS at 16:31

## 2017-08-19 RX ADMIN — DRONEDARONE 400 MG: 400 TABLET, FILM COATED ORAL at 09:02

## 2017-08-19 RX ADMIN — RIVAROXABAN 20 MG: 20 TABLET, FILM COATED ORAL at 17:29

## 2017-08-19 RX ADMIN — SODIUM CHLORIDE 100 ML/HR: 9 INJECTION, SOLUTION INTRAVENOUS at 04:46

## 2017-08-19 RX ADMIN — CEFTRIAXONE SODIUM 2 G: 2 INJECTION, SOLUTION INTRAVENOUS at 13:49

## 2017-08-19 RX ADMIN — DRONEDARONE 400 MG: 400 TABLET, FILM COATED ORAL at 17:29

## 2017-08-20 LAB
BACTERIA SPEC AEROBE CULT: ABNORMAL
GRAM STN SPEC: ABNORMAL
GRAM STN SPEC: ABNORMAL

## 2017-08-20 PROCEDURE — 25010000003 CEFTRIAXONE PER 250 MG: Performed by: NURSE PRACTITIONER

## 2017-08-20 RX ORDER — TORSEMIDE 20 MG/1
20 TABLET ORAL DAILY
Status: DISCONTINUED | OUTPATIENT
Start: 2017-08-20 | End: 2017-08-21 | Stop reason: HOSPADM

## 2017-08-20 RX ADMIN — CEFTRIAXONE SODIUM 2 G: 2 INJECTION, SOLUTION INTRAVENOUS at 14:22

## 2017-08-20 RX ADMIN — DRONEDARONE 400 MG: 400 TABLET, FILM COATED ORAL at 17:35

## 2017-08-20 RX ADMIN — SERTRALINE HYDROCHLORIDE 50 MG: 50 TABLET ORAL at 08:53

## 2017-08-20 RX ADMIN — DRONEDARONE 400 MG: 400 TABLET, FILM COATED ORAL at 08:53

## 2017-08-20 RX ADMIN — RIVAROXABAN 20 MG: 20 TABLET, FILM COATED ORAL at 17:34

## 2017-08-20 RX ADMIN — TORSEMIDE 20 MG: 20 TABLET ORAL at 14:29

## 2017-08-21 VITALS
BODY MASS INDEX: 32.33 KG/M2 | RESPIRATION RATE: 16 BRPM | DIASTOLIC BLOOD PRESSURE: 68 MMHG | HEIGHT: 75 IN | OXYGEN SATURATION: 95 % | HEART RATE: 47 BPM | TEMPERATURE: 97.6 F | WEIGHT: 260 LBS | SYSTOLIC BLOOD PRESSURE: 132 MMHG

## 2017-08-21 PROCEDURE — 97162 PT EVAL MOD COMPLEX 30 MIN: CPT

## 2017-08-21 PROCEDURE — 25010000003 CEFTRIAXONE PER 250 MG: Performed by: NURSE PRACTITIONER

## 2017-08-21 PROCEDURE — 97597 DBRDMT OPN WND 1ST 20 CM/<: CPT

## 2017-08-21 RX ORDER — CEFTRIAXONE SODIUM 2 G/50ML
2 INJECTION, SOLUTION INTRAVENOUS EVERY 24 HOURS
Refills: 0
Start: 2017-08-21 | End: 2018-12-09

## 2017-08-21 RX ADMIN — TORSEMIDE 20 MG: 20 TABLET ORAL at 09:11

## 2017-08-21 RX ADMIN — DRONEDARONE 400 MG: 400 TABLET, FILM COATED ORAL at 10:25

## 2017-08-21 RX ADMIN — SERTRALINE HYDROCHLORIDE 50 MG: 50 TABLET ORAL at 09:12

## 2017-08-21 RX ADMIN — CEFTRIAXONE SODIUM 2 G: 2 INJECTION, SOLUTION INTRAVENOUS at 10:26

## 2017-08-22 LAB
BACTERIA SPEC AEROBE CULT: ABNORMAL
BACTERIA SPEC ANAEROBE CULT: NORMAL
GRAM STN SPEC: ABNORMAL

## 2017-08-23 ENCOUNTER — HOSPITAL ENCOUNTER (OUTPATIENT)
Dept: PHYSICAL THERAPY | Facility: HOSPITAL | Age: 72
Setting detail: THERAPIES SERIES
Discharge: HOME | End: 2017-08-23

## 2017-08-23 DIAGNOSIS — S81.801D OPEN WOUND OF RIGHT LOWER EXTREMITY, SUBSEQUENT ENCOUNTER: Primary | ICD-10-CM

## 2017-08-23 PROCEDURE — 97162 PT EVAL MOD COMPLEX 30 MIN: CPT

## 2017-08-23 PROCEDURE — 97597 DBRDMT OPN WND 1ST 20 CM/<: CPT

## 2017-08-23 PROCEDURE — G8991 OTHER PT/OT GOAL STATUS: HCPCS

## 2017-08-23 PROCEDURE — G8990 OTHER PT/OT CURRENT STATUS: HCPCS

## 2017-08-24 ENCOUNTER — APPOINTMENT (OUTPATIENT)
Dept: LAB | Facility: HOSPITAL | Age: 72
End: 2017-08-24

## 2017-08-24 ENCOUNTER — TRANSCRIBE ORDERS (OUTPATIENT)
Dept: LAB | Facility: HOSPITAL | Age: 72
End: 2017-08-24

## 2017-08-24 DIAGNOSIS — B96.89 ACUTE BACTERIAL EPIGLOTTITIS: ICD-10-CM

## 2017-08-24 DIAGNOSIS — L03.115 CELLULITIS OF RIGHT FOOT: Primary | ICD-10-CM

## 2017-08-24 DIAGNOSIS — J05.10 ACUTE BACTERIAL EPIGLOTTITIS: ICD-10-CM

## 2017-08-24 LAB
ALBUMIN SERPL-MCNC: 3.7 G/DL (ref 3.2–4.8)
ALBUMIN/GLOB SERPL: 1.4 G/DL (ref 1.5–2.5)
ALP SERPL-CCNC: 72 U/L (ref 25–100)
ALT SERPL W P-5'-P-CCNC: 20 U/L (ref 7–40)
ANION GAP SERPL CALCULATED.3IONS-SCNC: 4 MMOL/L (ref 3–11)
AST SERPL-CCNC: 27 U/L (ref 0–33)
BASOPHILS # BLD AUTO: 0.02 10*3/MM3 (ref 0–0.2)
BASOPHILS NFR BLD AUTO: 0.4 % (ref 0–1)
BILIRUB SERPL-MCNC: 0.4 MG/DL (ref 0.3–1.2)
BUN BLD-MCNC: 19 MG/DL (ref 9–23)
BUN/CREAT SERPL: 19 (ref 7–25)
CALCIUM SPEC-SCNC: 9.2 MG/DL (ref 8.7–10.4)
CHLORIDE SERPL-SCNC: 100 MMOL/L (ref 99–109)
CO2 SERPL-SCNC: 32 MMOL/L (ref 20–31)
CREAT BLD-MCNC: 1 MG/DL (ref 0.6–1.3)
CRP SERPL-MCNC: 1.27 MG/DL (ref 0–1)
DEPRECATED RDW RBC AUTO: 43.7 FL (ref 37–54)
EOSINOPHIL # BLD AUTO: 0.41 10*3/MM3 (ref 0–0.3)
EOSINOPHIL NFR BLD AUTO: 8.1 % (ref 0–3)
ERYTHROCYTE [DISTWIDTH] IN BLOOD BY AUTOMATED COUNT: 12.6 % (ref 11.3–14.5)
ERYTHROCYTE [SEDIMENTATION RATE] IN BLOOD: 15 MM/HR (ref 0–20)
GFR SERPL CREATININE-BSD FRML MDRD: 74 ML/MIN/1.73
GLOBULIN UR ELPH-MCNC: 2.6 GM/DL
GLUCOSE BLD-MCNC: 101 MG/DL (ref 70–100)
HCT VFR BLD AUTO: 37.9 % (ref 38.9–50.9)
HGB BLD-MCNC: 12.6 G/DL (ref 13.1–17.5)
IMM GRANULOCYTES # BLD: 0.01 10*3/MM3 (ref 0–0.03)
IMM GRANULOCYTES NFR BLD: 0.2 % (ref 0–0.6)
LYMPHOCYTES # BLD AUTO: 1.54 10*3/MM3 (ref 0.6–4.8)
LYMPHOCYTES NFR BLD AUTO: 30.6 % (ref 24–44)
MCH RBC QN AUTO: 31.2 PG (ref 27–31)
MCHC RBC AUTO-ENTMCNC: 33.2 G/DL (ref 32–36)
MCV RBC AUTO: 93.8 FL (ref 80–99)
MONOCYTES # BLD AUTO: 0.46 10*3/MM3 (ref 0–1)
MONOCYTES NFR BLD AUTO: 9.1 % (ref 0–12)
NEUTROPHILS # BLD AUTO: 2.6 10*3/MM3 (ref 1.5–8.3)
NEUTROPHILS NFR BLD AUTO: 51.6 % (ref 41–71)
PLATELET # BLD AUTO: 236 10*3/MM3 (ref 150–450)
PMV BLD AUTO: 8.7 FL (ref 6–12)
POTASSIUM BLD-SCNC: 4.9 MMOL/L (ref 3.5–5.5)
PROT SERPL-MCNC: 6.3 G/DL (ref 5.7–8.2)
RBC # BLD AUTO: 4.04 10*6/MM3 (ref 4.2–5.76)
SODIUM BLD-SCNC: 136 MMOL/L (ref 132–146)
WBC NRBC COR # BLD: 5.04 10*3/MM3 (ref 3.5–10.8)

## 2017-08-24 PROCEDURE — 85652 RBC SED RATE AUTOMATED: CPT | Performed by: INTERNAL MEDICINE

## 2017-08-24 PROCEDURE — 80053 COMPREHEN METABOLIC PANEL: CPT | Performed by: INTERNAL MEDICINE

## 2017-08-24 PROCEDURE — 86140 C-REACTIVE PROTEIN: CPT | Performed by: INTERNAL MEDICINE

## 2017-08-24 PROCEDURE — 85025 COMPLETE CBC W/AUTO DIFF WBC: CPT | Performed by: INTERNAL MEDICINE

## 2017-08-24 PROCEDURE — 36415 COLL VENOUS BLD VENIPUNCTURE: CPT | Performed by: INTERNAL MEDICINE

## 2017-08-25 LAB — BACTERIA SPEC ANAEROBE CULT: NORMAL

## 2017-08-26 ENCOUNTER — HOSPITAL ENCOUNTER (OUTPATIENT)
Dept: PHYSICAL THERAPY | Facility: HOSPITAL | Age: 72
Setting detail: THERAPIES SERIES
Discharge: HOME | End: 2017-08-26

## 2017-08-26 DIAGNOSIS — S81.801D OPEN WOUND OF RIGHT LOWER EXTREMITY, SUBSEQUENT ENCOUNTER: Primary | ICD-10-CM

## 2017-08-26 PROCEDURE — 97597 DBRDMT OPN WND 1ST 20 CM/<: CPT

## 2017-08-26 NOTE — THERAPY WOUND CARE TREATMENT
Outpatient Rehabilitation - Wound/Debridement Treatment Note  [facility]     Patient Name: Vlad Arellano  : 1945  MRN: [id number]  Today's Date: 2017                Admit Date: 2017    Visit Dx:    ICD-10-CM ICD-9-CM   1. Open wound of right lower extremity, subsequent encounter S81.801D V58.89     891.0       Patient Active Problem List   Diagnosis   • Paroxysmal atrial fibrillation   • Tachy-krishna syndrome   • Benign hypertension   • Lower extremity edema   • PARKER (obstructive sleep apnea)   • Osteoarthritis   • Mild obesity   • MG, ocular (myasthenia gravis)   • Pain   • Hematoma of right thigh   • S/P Debridement irrigation and partial closure of cutaneous tissue right knee        Past Medical History:   Diagnosis Date   • Benign hypertension    • Lower extremity edema    • Mild obesity    • PARKER (obstructive sleep apnea)     requiring CPAP   • Osteoarthritis    • Paroxysmal atrial fibrillation    • Tachy-krishna syndrome         Past Surgical History:   Procedure Laterality Date   • CARDIOVERSION     • INCISION AND DRAINAGE LEG Right 2017    Procedure: INCISION AND DRAINAGE RIGHT KNEE;  Surgeon: Francisco Macias MD;  Location: [facility];  Service:    • NEPHROLITHOTRIPSY PERCUTANEOUS           EVALUATION        PT Ortho       17 0900    Subjective Comments    Subjective Comments Pt with no complaints today  -    Subjective Pain    Able to rate subjective pain? yes  -MF    Pre-Treatment Pain Level 0  -MF    Post-Treatment Pain Level 0  -MF    Transfers    Transfer, Comment Pt long sitting on stretcher for tx  -MF    Gait Assessment/Treatment    Gait, [place] Level independent  -      User Key  (r) = Recorded By, (t) = Taken By, (c) = Cosigned By    Initials Name Provider Type    PATRICIA Becerra, PT Physical Therapist                    LDA Wound       17 0900          Incision 17 1641 Right leg    Incision - Properties Group Placement Date: 17   -JV Placement Time: 1641 -JV Side: Right  -JV Location: leg  -JV    Incision WDL ex  -MF      Dressing Appearance moist drainage;intact  -MF      Appearance drainage;moist;open areas;pink;redness;sutures intact  -MF      Drainage Characteristics/Odor serosanguineous;sanguineous;no odor  -MF      Drainage Amount moderate  -MF      Wound Cleaning irrigated with;other (see comments)   phase one  -MF      Wound Interventions debrided;pulsatile high pressure lavage   500ml nsaline with fan tip  -MF      Dressing foam;low-adherent  -MF      Packing other (see comments)   hydrofera blue  -MF      Wound 08/18/17 1230 Left lateral heel abrasion    Wound - Properties Group Date first assessed: 08/18/17  -CB Time first assessed: 1230  -CB Side: Left  -CB Orientation: lateral  -CB Location: heel  -CB Type: abrasion  -CB      User Key  (r) = Recorded By, (t) = Taken By, (c) = Cosigned By    Initials Name Provider Type    PATRICIA Becerra, PT Physical Therapist    MARY ANN Harris, RN Registered Nurse    CB Christina Pretty RN Registered Nurse            WOUND DEBRIDEMENT  Debridement Site 1  Location- Site 1: R knee wound  Selective Debridement- Site 1: Wound Surface <20cmsq (~5 cm2)  Instruments- Site 1: tweezers, scissors  Excised Tissue Description- Site 1: minimum, slough  Bleeding- Site 1: none                   Therapy Education       08/26/17 0900          Therapy Education    Given Symptoms/condition management;Bandaging/dressing change;Edema management  -MF      Program Reinforced  -MF      How Provided Verbal  -MF      Provided to Patient  -MF      Level of Understanding Verbalized  -MF        User Key  (r) = Recorded By, (t) = Taken By, (c) = Cosigned By    Initials Name Provider Type    PATRICIA Becerra, PT Physical Therapist          Recommendation and Plan        PT Assessment/Plan       08/26/17 1054       PT Assessment    Impairments Integumentary integrity;Edema  -MF     Assessment Comments Wound bed  cont to be beefy red with some adherent slough to wound edges. Pt will benefit from cont pulse lavage to help decrease bioburden, but PT will decrease freq next week and have pt / family change dressing to progress to independent management of wound.   -     Rehab Potential Good  -     Patient/caregiver participated in establishment of treatment plan and goals Yes  -     Patient would benefit from skilled therapy intervention Yes  -     PT Plan    PT Frequency 2x/week  -     Physical Therapy Interventions (Optional Details) wound care;patient/family education  -     PT Plan Comments pulse lavage with debridement PRN.   -       User Key  (r) = Recorded By, (t) = Taken By, (c) = Cosigned By    Initials Name Provider Type     Favian Becerra, PT Physical Therapist          Goals        PT OP Goals       08/26/17 0900       Time Calculation    PT Goal Re-Cert Due Date 09/22/17  -       User Key  (r) = Recorded By, (t) = Taken By, (c) = Cosigned By    Initials Name Provider Type     Favian Becerra, PT Physical Therapist          PT Goal Re-Cert Due Date: 09/22/17            Time Calculation: Start Time: 0900  Total Timed Code Minutes- PT: 35 minute(s)    Therapy Charges for Today     Code Description Service Date Service Provider Modifiers Qty    26703032179 HC AALIYAH DEBRIDE OPEN WOUND UP TO 20CM 8/26/2017 Favian Becerra, PT GP 1                Favian Becerra, PT  8/26/2017

## 2017-08-28 ENCOUNTER — TRANSCRIBE ORDERS (OUTPATIENT)
Dept: LAB | Facility: HOSPITAL | Age: 72
End: 2017-08-28

## 2017-08-28 ENCOUNTER — OFFICE VISIT (OUTPATIENT)
Dept: CARDIOLOGY | Facility: CLINIC | Age: 72
End: 2017-08-28

## 2017-08-28 ENCOUNTER — HOSPITAL ENCOUNTER (OUTPATIENT)
Dept: PHYSICAL THERAPY | Facility: HOSPITAL | Age: 72
Setting detail: THERAPIES SERIES
Discharge: HOME | End: 2017-08-28

## 2017-08-28 ENCOUNTER — LAB (OUTPATIENT)
Dept: LAB | Facility: HOSPITAL | Age: 72
End: 2017-08-28

## 2017-08-28 VITALS
SYSTOLIC BLOOD PRESSURE: 131 MMHG | BODY MASS INDEX: 32.96 KG/M2 | DIASTOLIC BLOOD PRESSURE: 94 MMHG | WEIGHT: 256.8 LBS | HEIGHT: 74 IN | HEART RATE: 58 BPM

## 2017-08-28 DIAGNOSIS — I10 BENIGN HYPERTENSION: Primary | ICD-10-CM

## 2017-08-28 DIAGNOSIS — I48.0 PAROXYSMAL ATRIAL FIBRILLATION (HCC): ICD-10-CM

## 2017-08-28 DIAGNOSIS — S81.801D OPEN WOUND OF RIGHT LOWER EXTREMITY, SUBSEQUENT ENCOUNTER: Primary | ICD-10-CM

## 2017-08-28 DIAGNOSIS — L03.115 CELLULITIS OF RIGHT FOOT: ICD-10-CM

## 2017-08-28 DIAGNOSIS — L03.115 CELLULITIS OF RIGHT FOOT: Primary | ICD-10-CM

## 2017-08-28 DIAGNOSIS — R60.0 BILATERAL EDEMA OF LOWER EXTREMITY: ICD-10-CM

## 2017-08-28 DIAGNOSIS — I49.5 TACHY-BRADY SYNDROME (HCC): ICD-10-CM

## 2017-08-28 LAB
ALBUMIN SERPL-MCNC: 3.7 G/DL (ref 3.2–4.8)
ALBUMIN/GLOB SERPL: 1.5 G/DL (ref 1.5–2.5)
ALP SERPL-CCNC: 73 U/L (ref 25–100)
ALT SERPL W P-5'-P-CCNC: 22 U/L (ref 7–40)
ANION GAP SERPL CALCULATED.3IONS-SCNC: 4 MMOL/L (ref 3–11)
AST SERPL-CCNC: 26 U/L (ref 0–33)
BASOPHILS # BLD AUTO: 0.02 10*3/MM3 (ref 0–0.2)
BASOPHILS NFR BLD AUTO: 0.4 % (ref 0–1)
BILIRUB SERPL-MCNC: 0.6 MG/DL (ref 0.3–1.2)
BUN BLD-MCNC: 18 MG/DL (ref 9–23)
BUN/CREAT SERPL: 15 (ref 7–25)
CALCIUM SPEC-SCNC: 9.2 MG/DL (ref 8.7–10.4)
CHLORIDE SERPL-SCNC: 105 MMOL/L (ref 99–109)
CK SERPL-CCNC: 41 U/L (ref 26–174)
CO2 SERPL-SCNC: 32 MMOL/L (ref 20–31)
CREAT BLD-MCNC: 1.2 MG/DL (ref 0.6–1.3)
CRP SERPL-MCNC: 0.71 MG/DL (ref 0–1)
DEPRECATED RDW RBC AUTO: 46.2 FL (ref 37–54)
EOSINOPHIL # BLD AUTO: 0.35 10*3/MM3 (ref 0–0.3)
EOSINOPHIL NFR BLD AUTO: 7.5 % (ref 0–3)
ERYTHROCYTE [DISTWIDTH] IN BLOOD BY AUTOMATED COUNT: 13.1 % (ref 11.3–14.5)
ERYTHROCYTE [SEDIMENTATION RATE] IN BLOOD: 19 MM/HR (ref 0–20)
GFR SERPL CREATININE-BSD FRML MDRD: 60 ML/MIN/1.73
GLOBULIN UR ELPH-MCNC: 2.4 GM/DL
GLUCOSE BLD-MCNC: 123 MG/DL (ref 70–100)
HCT VFR BLD AUTO: 39.2 % (ref 38.9–50.9)
HGB BLD-MCNC: 12.7 G/DL (ref 13.1–17.5)
IMM GRANULOCYTES # BLD: 0.01 10*3/MM3 (ref 0–0.03)
IMM GRANULOCYTES NFR BLD: 0.2 % (ref 0–0.6)
LYMPHOCYTES # BLD AUTO: 1.37 10*3/MM3 (ref 0.6–4.8)
LYMPHOCYTES NFR BLD AUTO: 29.4 % (ref 24–44)
MCH RBC QN AUTO: 31.4 PG (ref 27–31)
MCHC RBC AUTO-ENTMCNC: 32.4 G/DL (ref 32–36)
MCV RBC AUTO: 97 FL (ref 80–99)
MONOCYTES # BLD AUTO: 0.41 10*3/MM3 (ref 0–1)
MONOCYTES NFR BLD AUTO: 8.8 % (ref 0–12)
NEUTROPHILS # BLD AUTO: 2.5 10*3/MM3 (ref 1.5–8.3)
NEUTROPHILS NFR BLD AUTO: 53.7 % (ref 41–71)
PLATELET # BLD AUTO: 209 10*3/MM3 (ref 150–450)
PMV BLD AUTO: 9.3 FL (ref 6–12)
POTASSIUM BLD-SCNC: 4.7 MMOL/L (ref 3.5–5.5)
PROT SERPL-MCNC: 6.1 G/DL (ref 5.7–8.2)
RBC # BLD AUTO: 4.04 10*6/MM3 (ref 4.2–5.76)
SODIUM BLD-SCNC: 141 MMOL/L (ref 132–146)
WBC NRBC COR # BLD: 4.66 10*3/MM3 (ref 3.5–10.8)

## 2017-08-28 PROCEDURE — 36415 COLL VENOUS BLD VENIPUNCTURE: CPT | Performed by: INTERNAL MEDICINE

## 2017-08-28 PROCEDURE — 82550 ASSAY OF CK (CPK): CPT | Performed by: INTERNAL MEDICINE

## 2017-08-28 PROCEDURE — 85025 COMPLETE CBC W/AUTO DIFF WBC: CPT | Performed by: INTERNAL MEDICINE

## 2017-08-28 PROCEDURE — 85652 RBC SED RATE AUTOMATED: CPT | Performed by: INTERNAL MEDICINE

## 2017-08-28 PROCEDURE — 86140 C-REACTIVE PROTEIN: CPT | Performed by: INTERNAL MEDICINE

## 2017-08-28 PROCEDURE — 99214 OFFICE O/P EST MOD 30 MIN: CPT | Performed by: PHYSICIAN ASSISTANT

## 2017-08-28 PROCEDURE — 80053 COMPREHEN METABOLIC PANEL: CPT | Performed by: INTERNAL MEDICINE

## 2017-08-28 PROCEDURE — 97597 DBRDMT OPN WND 1ST 20 CM/<: CPT

## 2017-08-28 PROCEDURE — 93000 ELECTROCARDIOGRAM COMPLETE: CPT | Performed by: PHYSICIAN ASSISTANT

## 2017-08-28 NOTE — PROGRESS NOTES
[facility] - Office Note  Vlad Arellano         [address] [place] [address]  1945   [phone] (home) [phone] (work)     LOCATION:  [facility].  Visit Type: Follow Up.    PCP:  Saleem Yang MD  Orthopedic surgeon: Dr. Macias  Infectious disease: Dr. Favian Aaron    08/28/17   Vlad Arellano is a 72 y.o.  male  currently employed as a farmer.      Chief Complaint: FU on HTN, Edema, AFib.  PROBLEM LIST:  1. Paroxysmal atrial fibrillation:  a. Diagnosed 4 years ago with external cardioversion x3.  b. CHADS-Vasc = 2.  On Xarelto.  c. Most recent external cardioversion on Multaq, 04/21/2016. Successful conversion to sinus rhythm. Cessation of Multaq and initiation of sotalol with hospitalization.  d. ER visit 1/18/17 for AFib with RVR.  2. Tachy-krishna syndrome.   3. Benign hypertension.   4. Lower extremity edema.   5. Obstructive sleep apnea requiring CPAP.   6. Osteoarthritis.   7. Mild obesity.   8. Family history of hypertension.   9. Right knee subcutaneous hematoma/seroma with skin necrosis. S/P debridement and partial closure.  Following with IV antibiotics, wound care.  10. Surgical history:   a. External cardioversion x3.  b. Lithotripsy.      Allergies   Allergen Reactions   • Naproxen      Patient states he has made changes on that         Current Outpatient Prescriptions:   •  cefTRIAXone (ROCEPHIN) 40 MG/ML IVPB, Infuse 50 mL into a venous catheter Daily. Per [facility]  Indications: Bone and/or Joint Infection, Disp: , Rfl: 0  •  dronedarone (MULTAQ) 400 MG tablet, Take 1 tablet by mouth 2 (Two) Times a Day With Meals., Disp: 60 tablet, Rfl: 6  •  meloxicam (MOBIC) 7.5 MG tablet, Take 1 tablet by mouth daily., Disp: 30 tablet, Rfl: 6  •  rivaroxaban (XARELTO) 20 MG tablet, Take 1 tablet by mouth Daily., Disp: 30 tablet, Rfl: 6  •  sertraline (ZOLOFT) 50 MG tablet, Take 50 mg by mouth Daily., Disp: , Rfl:   •  torsemide (DEMADEX) 20  "MG tablet, Take 20 mg by mouth daily., Disp: , Rfl:     HPI  Mr. Arellano is here for routine follow-up on long-standing controlled hypertension, chronic tachybradycardia and paroxysmal atrial fibrillation.  In the past he's been fairly well rate controlled and anticoagulated for stroke prevention.  He continues on his medications without symptoms or side effects.  He recently underwent surgery for a right knee hematoma evacuation for skin necrosis and swelling.  He is now doing quite well, undergoing wound care and physical therapy along with antibiotics, followed by infectious disease.  To his knowledge, he's had no atrial fibrillation since his last visit.  He is back on his Xarelto and has continued on his multaq.      The following portions of the patient's history were reviewed in the chart and updated as appropriate: allergies, current medications, past family history, past medical history, past social history, past surgical history and problem list.    Review of Systems   Constitution: Negative for weakness and malaise/fatigue.   Cardiovascular: Positive for leg swelling. Negative for chest pain, dyspnea on exertion, irregular heartbeat and palpitations.   Hematologic/Lymphatic: Positive for bleeding problem. Bruises/bleeds easily.   Skin: Positive for color change.   All other systems reviewed and are negative.            height is 74\" (188 cm) and weight is 256 lb 12.8 oz (116 kg). His blood pressure is 131/94 and his pulse is 58.   Physical Exam   Constitutional: Vital signs are normal. He appears well-developed and well-nourished.   Cardiovascular: Normal rate, regular rhythm, S1 normal, S2 normal, normal heart sounds, intact distal pulses and normal pulses.    Pulmonary/Chest: Effort normal and breath sounds normal. He has no wheezes. He has no rhonchi. He has no rales.   Abdominal: Soft. Normal appearance and bowel sounds are normal. There is no hepatosplenomegaly.   Neurological: He is alert. "   Extremities:  1+ edema bilateral lower extremities.  The left greater than right.  There is a dressing with moderate amount of drainage noted.  Dressing was intact otherwise and not removed.        ECG 12 Lead  Date/Time: 8/28/2017 9:23 AM  Performed by: ZOYA ALARCON  Authorized by: ZOYA ALARCON   Comparison: compared with previous ECG from 8/18/2017  Similar to previous ECG  Rhythm: sinus rhythm and sinus bradycardia  Rate: bradycardic  QRS axis: normal  Clinical impression: abnormal ECG             Assessment/ Plan     Benign hypertension:  Fairly well controlled today.  Continue current medical regimen and continue to monitor renal function with routine bloodwork.    Tachy-krishna syndrome:  EKG performed today.  Heart rate slightly lower than it was a few weeks ago.  Patient is doing well and asymptomatic.  Continue to monitor heart rates    Paroxysmal atrial fibrillation:  EKG performed and reviewed today.  Maintaining sinus rhythm with multiple back.  Rate controlled although bradycardic.    Bilateral edema of lower extremity:  Still present but stable.  Patient having no symptoms.  Continue to monitor, elevate legs, avoid excessive sodium intake.      Zoya Alarcon PA-C  8/28/2017 9:21 AM      EMR Dragon/Transcription disclaimer:   Much of this encounter note is an electronic transcription/translation of spoken language to printed text. The electronic translation of spoken language may permit erroneous, or at times, nonsensical words or phrases to be inadvertently transcribed; Although I have reviewed the note for such errors, some may still exist.

## 2017-08-29 NOTE — THERAPY WOUND CARE TREATMENT
Outpatient Rehabilitation - Wound/Debridement Treatment Note   [facility]     Patient Name: Vlad Arellano  : 1945  MRN: [id number]  Today's Date: 2017                Admit Date: 2017    Visit Dx:    ICD-10-CM ICD-9-CM   1. Open wound of right lower extremity, subsequent encounter S81.801D V58.89     891.0       Patient Active Problem List   Diagnosis   • Paroxysmal atrial fibrillation   • Tachy-krishna syndrome   • Benign hypertension   • Lower extremity edema   • PARKER (obstructive sleep apnea)   • Osteoarthritis   • Mild obesity   • MG, ocular (myasthenia gravis)   • Pain   • Hematoma of right thigh   • S/P Debridement irrigation and partial closure of cutaneous tissue right knee        Past Medical History:   Diagnosis Date   • Benign hypertension    • Lower extremity edema    • Mild obesity    • PARKER (obstructive sleep apnea)     requiring CPAP   • Osteoarthritis    • Paroxysmal atrial fibrillation    • Tachy-krishna syndrome         Past Surgical History:   Procedure Laterality Date   • CARDIOVERSION     • INCISION AND DRAINAGE LEG Right 2017    Procedure: INCISION AND DRAINAGE RIGHT KNEE;  Surgeon: Francisco Macias MD;  Location: [facility];  Service:    • NEPHROLITHOTRIPSY PERCUTANEOUS           EVALUATION        PT Ortho       17 1530    Subjective Comments    Subjective Comments No issues to report  -ES    Subjective Pain    Able to rate subjective pain? yes  -ES    Pre-Treatment Pain Level 0  -ES    Post-Treatment Pain Level 0  -ES    Transfers    Transfer, Comment long sitting for treatment on stretcher  -ES      User Key  (r) = Recorded By, (t) = Taken By, (c) = Cosigned By    Initials Name Provider Type    ES Livier Reardon PT Physical Therapist                    LDA Wound       17 1530          Incision 17 1641 Right leg    Incision - Properties Group Placement Date: 17  -JV Placement Time: 1641J Side: Right  -JV Location: leg  -JV    Incision  "WDL ex  -ES      Dressing Appearance moist drainage;intact  -ES      Appearance drainage;moist;open areas;pink;redness;sutures intact  -ES      Drainage Characteristics/Odor serosanguineous;sanguineous;no odor  -ES      Drainage Amount moderate  -ES      Wound Cleaning irrigated with;other (see comments)   phase one  -ES      Wound Interventions debrided;pulsatile high pressure lavage   500ml with fan tip  -ES      Dressing Dressing applied;low-adherent;foam   6\"optifoam border  -ES      Packing other (see comments)   hydrofera blue classic  -ES      Wound 08/18/17 1230 Left lateral heel abrasion    Wound - Properties Group Date first assessed: 08/18/17  -CB Time first assessed: 1230  -CB Side: Left  -CB Orientation: lateral  -CB Location: heel  -CB Type: abrasion  -CB      User Key  (r) = Recorded By, (t) = Taken By, (c) = Cosigned By    Initials Name Provider Type    SHAVONNE Reardon, PT Physical Therapist    MARY ANN Harris, RN Registered Nurse    CB Christina Pretty, BAKARI Registered Nurse            WOUND DEBRIDEMENT  Debridement Site 1  Location- Site 1: R knee wound  Selective Debridement- Site 1: Wound Surface <20cmsq (5sqcm)  Instruments- Site 1: tweezers  Excised Tissue Description- Site 1: minimum, slough  Bleeding- Site 1: none                   Therapy Education       08/28/17 1530          Therapy Education    Given Symptoms/condition management;Bandaging/dressing change;Edema management  -ES      Program Reinforced  -ES      How Provided Verbal  -ES      Provided to Patient  -ES      Level of Understanding Verbalized  -ES        User Key  (r) = Recorded By, (t) = Taken By, (c) = Cosigned By    Initials Name Provider Type    ES Livier Reardon, PT Physical Therapist          Recommendation and Plan        PT Assessment/Plan       08/28/17 1530       PT Assessment    Functional Limitations Other (comment)   wound management  -ES     Impairments Integumentary integrity;Edema  -ES     Assessment Comments " Loose slough and thin biofilm continue to build requiring debridement though wound bed appropriate for reducing frequency of pulse lavage with home dressing changes.   -ES     PT Plan    Physical Therapy Interventions (Optional Details) patient/family education;wound care  -ES     PT Plan Comments reduce to twice weekly for pulselavage and other debridement PRN  -ES       User Key  (r) = Recorded By, (t) = Taken By, (c) = Cosigned By    Initials Name Provider Type    SHAVONNE Reardon, PT Physical Therapist          Goals        PT OP Goals       08/28/17 1530       Time Calculation    PT Goal Re-Cert Due Date 09/22/17  -ES       User Key  (r) = Recorded By, (t) = Taken By, (c) = Cosigned By    Initials Name Provider Type    SHAVONNE Reardon, PT Physical Therapist          PT Goal Re-Cert Due Date: 09/22/17            Time Calculation: Start Time: 1530    Therapy Charges for Today     Code Description Service Date Service Provider Modifiers Qty    38293611790 HC AALIYAH DEBRIDE OPEN WOUND UP TO 20CM 8/28/2017 Livier Reardon, PT GP 1                Livier Reardon, PT  8/29/2017

## 2017-08-31 ENCOUNTER — HOSPITAL ENCOUNTER (OUTPATIENT)
Dept: PHYSICAL THERAPY | Facility: HOSPITAL | Age: 72
Setting detail: THERAPIES SERIES
Discharge: HOME | End: 2017-08-31

## 2017-08-31 DIAGNOSIS — S81.801D OPEN WOUND OF RIGHT LOWER EXTREMITY, SUBSEQUENT ENCOUNTER: Primary | ICD-10-CM

## 2017-08-31 PROCEDURE — 97597 DBRDMT OPN WND 1ST 20 CM/<: CPT

## 2017-09-05 ENCOUNTER — TRANSCRIBE ORDERS (OUTPATIENT)
Dept: LAB | Facility: HOSPITAL | Age: 72
End: 2017-09-05

## 2017-09-05 ENCOUNTER — APPOINTMENT (OUTPATIENT)
Dept: LAB | Facility: HOSPITAL | Age: 72
End: 2017-09-05

## 2017-09-05 ENCOUNTER — HOSPITAL ENCOUNTER (OUTPATIENT)
Dept: PHYSICAL THERAPY | Facility: HOSPITAL | Age: 72
Setting detail: THERAPIES SERIES
Discharge: HOME | End: 2017-09-05

## 2017-09-05 DIAGNOSIS — L03.115 CELLULITIS OF RIGHT FOOT: ICD-10-CM

## 2017-09-05 DIAGNOSIS — S81.801D OPEN WOUND OF RIGHT LOWER EXTREMITY, SUBSEQUENT ENCOUNTER: Primary | ICD-10-CM

## 2017-09-05 DIAGNOSIS — IMO0001 ESCHERICHIA COLI SPECIES NOT ISOLATED: Primary | ICD-10-CM

## 2017-09-05 LAB
ALBUMIN SERPL-MCNC: 3.8 G/DL (ref 3.2–4.8)
ALBUMIN/GLOB SERPL: 1.4 G/DL (ref 1.5–2.5)
ALP SERPL-CCNC: 82 U/L (ref 25–100)
ALT SERPL W P-5'-P-CCNC: 20 U/L (ref 7–40)
ANION GAP SERPL CALCULATED.3IONS-SCNC: 8 MMOL/L (ref 3–11)
AST SERPL-CCNC: 26 U/L (ref 0–33)
BASOPHILS # BLD AUTO: 0.02 10*3/MM3 (ref 0–0.2)
BASOPHILS NFR BLD AUTO: 0.4 % (ref 0–1)
BILIRUB SERPL-MCNC: 0.8 MG/DL (ref 0.3–1.2)
BUN BLD-MCNC: 17 MG/DL (ref 9–23)
BUN/CREAT SERPL: 13.1 (ref 7–25)
CALCIUM SPEC-SCNC: 9.1 MG/DL (ref 8.7–10.4)
CHLORIDE SERPL-SCNC: 101 MMOL/L (ref 99–109)
CK SERPL-CCNC: 42 U/L (ref 26–174)
CO2 SERPL-SCNC: 31 MMOL/L (ref 20–31)
CREAT BLD-MCNC: 1.3 MG/DL (ref 0.6–1.3)
CRP SERPL-MCNC: 0.44 MG/DL (ref 0–1)
DEPRECATED RDW RBC AUTO: 45.8 FL (ref 37–54)
EOSINOPHIL # BLD AUTO: 0.41 10*3/MM3 (ref 0–0.3)
EOSINOPHIL NFR BLD AUTO: 8.4 % (ref 0–3)
ERYTHROCYTE [DISTWIDTH] IN BLOOD BY AUTOMATED COUNT: 13.1 % (ref 11.3–14.5)
ERYTHROCYTE [SEDIMENTATION RATE] IN BLOOD: 9 MM/HR (ref 0–20)
GFR SERPL CREATININE-BSD FRML MDRD: 54 ML/MIN/1.73
GLOBULIN UR ELPH-MCNC: 2.7 GM/DL
GLUCOSE BLD-MCNC: 120 MG/DL (ref 70–100)
HCT VFR BLD AUTO: 42 % (ref 38.9–50.9)
HGB BLD-MCNC: 13.8 G/DL (ref 13.1–17.5)
IMM GRANULOCYTES # BLD: 0.01 10*3/MM3 (ref 0–0.03)
IMM GRANULOCYTES NFR BLD: 0.2 % (ref 0–0.6)
LYMPHOCYTES # BLD AUTO: 1.35 10*3/MM3 (ref 0.6–4.8)
LYMPHOCYTES NFR BLD AUTO: 27.7 % (ref 24–44)
MCH RBC QN AUTO: 31.2 PG (ref 27–31)
MCHC RBC AUTO-ENTMCNC: 32.9 G/DL (ref 32–36)
MCV RBC AUTO: 94.8 FL (ref 80–99)
MONOCYTES # BLD AUTO: 0.46 10*3/MM3 (ref 0–1)
MONOCYTES NFR BLD AUTO: 9.4 % (ref 0–12)
NEUTROPHILS # BLD AUTO: 2.62 10*3/MM3 (ref 1.5–8.3)
NEUTROPHILS NFR BLD AUTO: 53.9 % (ref 41–71)
PLATELET # BLD AUTO: 235 10*3/MM3 (ref 150–450)
PMV BLD AUTO: 9.4 FL (ref 6–12)
POTASSIUM BLD-SCNC: 4.7 MMOL/L (ref 3.5–5.5)
PROT SERPL-MCNC: 6.5 G/DL (ref 5.7–8.2)
RBC # BLD AUTO: 4.43 10*6/MM3 (ref 4.2–5.76)
SODIUM BLD-SCNC: 140 MMOL/L (ref 132–146)
WBC NRBC COR # BLD: 4.87 10*3/MM3 (ref 3.5–10.8)

## 2017-09-05 PROCEDURE — 82550 ASSAY OF CK (CPK): CPT | Performed by: INTERNAL MEDICINE

## 2017-09-05 PROCEDURE — 80053 COMPREHEN METABOLIC PANEL: CPT | Performed by: INTERNAL MEDICINE

## 2017-09-05 PROCEDURE — 97610 LOW FREQUENCY NON-THERMAL US: CPT

## 2017-09-05 PROCEDURE — 86140 C-REACTIVE PROTEIN: CPT | Performed by: INTERNAL MEDICINE

## 2017-09-05 PROCEDURE — 97597 DBRDMT OPN WND 1ST 20 CM/<: CPT

## 2017-09-05 PROCEDURE — 36415 COLL VENOUS BLD VENIPUNCTURE: CPT | Performed by: INTERNAL MEDICINE

## 2017-09-05 PROCEDURE — 85652 RBC SED RATE AUTOMATED: CPT | Performed by: INTERNAL MEDICINE

## 2017-09-05 PROCEDURE — 85025 COMPLETE CBC W/AUTO DIFF WBC: CPT | Performed by: INTERNAL MEDICINE

## 2017-09-05 NOTE — THERAPY WOUND CARE TREATMENT
Outpatient Rehabilitation - Wound/Debridement Treatment Note  [facility]     Patient Name: Vlad Arellano  : 1945  MRN: [id number]  Today's Date: 2017                Admit Date: 2017    Visit Dx:    ICD-10-CM ICD-9-CM   1. Open wound of right lower extremity, subsequent encounter S81.801D V58.89     891.0       Patient Active Problem List   Diagnosis   • Paroxysmal atrial fibrillation   • Tachy-krishna syndrome   • Benign hypertension   • Lower extremity edema   • PARKER (obstructive sleep apnea)   • Osteoarthritis   • Mild obesity   • MG, ocular (myasthenia gravis)   • Pain   • Hematoma of right thigh   • S/P Debridement irrigation and partial closure of cutaneous tissue right knee        Past Medical History:   Diagnosis Date   • Benign hypertension    • Lower extremity edema    • Mild obesity    • PARKER (obstructive sleep apnea)     requiring CPAP   • Osteoarthritis    • Paroxysmal atrial fibrillation    • Tachy-krishna syndrome         Past Surgical History:   Procedure Laterality Date   • CARDIOVERSION     • INCISION AND DRAINAGE LEG Right 2017    Procedure: INCISION AND DRAINAGE RIGHT KNEE;  Surgeon: Francisco Macias MD;  Location: [facility];  Service:    • NEPHROLITHOTRIPSY PERCUTANEOUS           EVALUATION        PT Ortho       17 0940    Subjective Comments    Subjective Comments No complaints or changes. Wife changed the dressing . They follow up with Dr. Aaron at 10:15.  -    Subjective Pain    Able to rate subjective pain? yes  -    Pre-Treatment Pain Level 0  -    Post-Treatment Pain Level 0  -    Transfers    Transfer, Comment Pt supine on stretcher for tx.  -    Gait Assessment/Treatment    Gait, [place] Level independent  -      User Key  (r) = Recorded By, (t) = Taken By, (c) = Cosigned By    Initials Name Provider Type    SHAHRIAR Grubbs, PT Physical Therapist                    LDA Wound       17 0940          Incision 17  "1641 Right leg    Incision - Properties Group Placement Date: 08/18/17  -JV Placement Time: 1641 -JV Side: Right  -JV Location: leg  -JV    Incision WDL ex  -MC      Dressing Appearance moist drainage;intact  -MC      Appearance drainage;moist;open areas;pink;redness;sutures intact  -MC      Drainage Characteristics/Odor serosanguineous;no odor  -MC      Drainage Amount moderate  -MC      Wound Cleaning cleansed with;other (see comments)   phase one  -MC      Wound Interventions debrided;other (see comments)   MIST 8 minutes  -MC      Dressing Dressing applied;other (see comments);foam;low-adherent   saline-moist HFB, 6\" optifoam, spandage  -MC      Packing other (see comments)   saline-moist HFB to tunnel  -MC      Wound 08/18/17 1230 Left lateral heel abrasion    Wound - Properties Group Date first assessed: 08/18/17  -CB Time first assessed: 1230  -CB Side: Left  -CB Orientation: lateral  -CB Location: heel  -CB Type: abrasion  -CB      User Key  (r) = Recorded By, (t) = Taken By, (c) = Cosigned By    Initials Name Provider Type    JLOREN Harris, RN Registered Nurse    SHAHRIAR Grubbs, PT Physical Therapist    CB Christina Pretty RN Registered Nurse            WOUND DEBRIDEMENT  Debridement Site 1  Location- Site 1: R knee wound  Selective Debridement- Site 1: Wound Surface <20cmsq (about 2 cm2)  Instruments- Site 1: tweezers  Excised Tissue Description- Site 1: minimum, slough  Bleeding- Site 1: scant                   Therapy Education       09/05/17 0940          Therapy Education    Education Details Discussed utility of MIST to accelerate healing, as pulse lavage is no longer warranted. Continue with current POC otherwise.  -MC      Program Reinforced  -MC      How Provided Verbal  -MC      Provided to Patient  -MC      Level of Understanding Verbalized  -MC        User Key  (r) = Recorded By, (t) = Taken By, (c) = Cosigned By    Initials Name Provider Type    SHAHRIAR Grubbs, PT Physical " Therapist          Recommendation and Plan        PT Assessment/Plan       09/05/17 0940       PT Assessment    Functional Limitations Other (comment)   wound mgmt  -     Impairments Integumentary integrity;Edema  -     Assessment Comments The wound bed is moist, clean, and granulated except for minimal fibrous tissue at the edge of the wound. Pt still with moderate induration along the tunnel. Initiated MIST today to increase blood flow and stimulate wound healing. Pt will continue to benefit from skilled PT wound care to continue progress.  -     Rehab Potential Good  -     Patient/caregiver participated in establishment of treatment plan and goals Yes  -     Patient would benefit from skilled therapy intervention Yes  -MC     PT Plan    PT Frequency 2x/week  -     Physical Therapy Interventions (Optional Details) patient/family education;wound care  -     PT Plan Comments MIST, debridement  -       User Key  (r) = Recorded By, (t) = Taken By, (c) = Cosigned By    Initials Name Provider Type     Dodie Grubbs, PT Physical Therapist          Goals        PT OP Goals       09/05/17 0940       Time Calculation    PT Goal Re-Cert Due Date 09/22/17  -       User Key  (r) = Recorded By, (t) = Taken By, (c) = Cosigned By    Initials Name Provider Type     Dodie Grubbs, PT Physical Therapist          PT Goal Re-Cert Due Date: 09/22/17            Time Calculation: Start Time: 0940    Therapy Charges for Today     Code Description Service Date Service Provider Modifiers Qty    37147029241 HC PT NLFU MIST 9/5/2017 Dodie Grubbs, PT 60, GP 1    [id number] HC AALIYAH DEBRIDE OPEN WOUND UP TO 20CM 9/5/2017 Dodie Grubbs, PT GP 1                Dodie Grubbs, PT  9/5/2017

## 2017-09-08 ENCOUNTER — HOSPITAL ENCOUNTER (OUTPATIENT)
Dept: PHYSICAL THERAPY | Facility: HOSPITAL | Age: 72
Setting detail: THERAPIES SERIES
Discharge: HOME | End: 2017-09-08

## 2017-09-08 DIAGNOSIS — S81.801D OPEN WOUND OF RIGHT LOWER EXTREMITY, SUBSEQUENT ENCOUNTER: Primary | ICD-10-CM

## 2017-09-08 PROCEDURE — 97610 LOW FREQUENCY NON-THERMAL US: CPT

## 2017-09-08 PROCEDURE — 97597 DBRDMT OPN WND 1ST 20 CM/<: CPT

## 2017-09-08 NOTE — THERAPY WOUND CARE TREATMENT
Outpatient Rehabilitation - Wound/Debridement Treatment Note  [facility]     Patient Name: Vlad Arellano  : 1945  MRN: [id number]  Today's Date: 2017                Admit Date: 2017    Visit Dx:    ICD-10-CM ICD-9-CM   1. Open wound of right lower extremity, subsequent encounter S81.801D V58.89     891.0       Patient Active Problem List   Diagnosis   • Paroxysmal atrial fibrillation   • Tachy-krishna syndrome   • Benign hypertension   • Lower extremity edema   • PARKER (obstructive sleep apnea)   • Osteoarthritis   • Mild obesity   • MG, ocular (myasthenia gravis)   • Pain   • Hematoma of right thigh   • S/P Debridement irrigation and partial closure of cutaneous tissue right knee        Past Medical History:   Diagnosis Date   • Benign hypertension    • Lower extremity edema    • Mild obesity    • PARKER (obstructive sleep apnea)     requiring CPAP   • Osteoarthritis    • Paroxysmal atrial fibrillation    • Tachy-krishna syndrome         Past Surgical History:   Procedure Laterality Date   • CARDIOVERSION     • INCISION AND DRAINAGE LEG Right 2017    Procedure: INCISION AND DRAINAGE RIGHT KNEE;  Surgeon: Francisco Macias MD;  Location: [facility];  Service:    • NEPHROLITHOTRIPSY PERCUTANEOUS           EVALUATION        PT Ortho       17 1030    Subjective Comments    Subjective Comments Pt and spouse report no new issues.  Pt states he was seen by his surgeon this morning and MD was pleased with his progress.  -    Subjective Pain    Able to rate subjective pain? yes  -    Pre-Treatment Pain Level 0  -    Post-Treatment Pain Level 0  -    Transfers    Transfer, Comment Pt supine on stretcher for tx.  -    Gait Assessment/Treatment    Gait, [place] Level independent  -      User Key  (r) = Recorded By, (t) = Taken By, (c) = Cosigned By    Initials Name Provider Type    JULISSA Inman, PT Physical Therapist                    LDA Wound       17 1030        "   Incision 08/18/17 1641 Right leg    Incision - Properties Group Placement Date: 08/18/17  -JV Placement Time: 1641  -JV Side: Right  -JV Location: leg  -JV    Incision WDL ex  -JM      Dressing Appearance moist drainage;intact;other (see comments)   covaderm on wound s/p MD office visit  -JM      Appearance drainage;moist;open areas;pink;redness;sutures intact   90% beefy granulation, 10% yellow fibrous slough  -JM      Incision Length (cm) 4  -JM      Incision Width (cm) 2.9  -JM      Incision Depth (cm) 0.4  -JM      Tunneling [depth (cm)/Location] 4.5cm@12:00  -      Drainage Characteristics/Odor serosanguineous;no odor  -      Drainage Amount small  -JM      Wound Cleaning cleansed with;other (see comments)   phase one  -JM      Wound Interventions debrided;other (see comments)   MIST 10 min  -      Dressing Dressing applied;low-adherent;foam   6\" optifoam gentle, spandage  -      Packing Incision packed with;other (see comments)   saline-moistened HFB  -JM      Wound 08/18/17 1230 Left lateral heel abrasion    Wound - Properties Group Date first assessed: 08/18/17  -CB Time first assessed: 1230  -CB Side: Left  -CB Orientation: lateral  -CB Location: heel  -CB Type: abrasion  -CB      User Key  (r) = Recorded By, (t) = Taken By, (c) = Cosigned By    Initials Name Provider Type    JV Demarcus Harris, RN Registered Nurse    JULISSA Inman, PT Physical Therapist    CB Christina Pretty RN Registered Nurse            WOUND DEBRIDEMENT  Debridement Site 1  Location- Site 1: R knee wound  Selective Debridement- Site 1: Wound Surface <20cmsq (2 cm sq)  Instruments- Site 1: tweezers  Excised Tissue Description- Site 1: minimum, slough  Bleeding- Site 1: scant                   Therapy Education       09/08/17 1030          Therapy Education    Education Details Continuation of MIST, continue with PRN dressing change every 3 days  -      Given Symptoms/condition management;Bandaging/dressing " change;Edema management  -      Program Reinforced  -      How Provided Verbal  -      Provided to Patient  -      Level of Understanding Verbalized  -        User Key  (r) = Recorded By, (t) = Taken By, (c) = Cosigned By    Initials Name Provider Type    JULISSA Inman, PT Physical Therapist          Recommendation and Plan        PT Assessment/Plan       09/08/17 1030       PT Assessment    Functional Limitations Other (comment)   wound mgmt  -     Impairments Integumentary integrity;Edema  -     Assessment Comments Wound area and tunnel depth decreasing.  Wound bed is beefy red granulation with min residual fibrous slough around periphery.  Still moderatly indurated on medial thigh but improving.  Pt will benefit from continued MIST and debridement.  -     Rehab Potential Good  -     Patient/caregiver participated in establishment of treatment plan and goals Yes  -     Patient would benefit from skilled therapy intervention Yes  -     PT Plan    PT Frequency 2x/week;3x/week  -     Physical Therapy Interventions (Optional Details) patient/family education;wound care  -     PT Plan Comments MIST, debridement  -       User Key  (r) = Recorded By, (t) = Taken By, (c) = Cosigned By    Initials Name Provider Type    JULISSA Inman, PT Physical Therapist          Goals        PT OP Goals       09/08/17 1030       Time Calculation    PT Goal Re-Cert Due Date 09/22/17  -       User Key  (r) = Recorded By, (t) = Taken By, (c) = Cosigned By    Initials Name Provider Type    JULISSA Inman, PT Physical Therapist          PT Goal Re-Cert Due Date: 09/22/17            Time Calculation: Start Time: 1030    Therapy Charges for Today     Code Description Service Date Service Provider Modifiers Qty    [id number]  PT NLFU MIST 9/8/2017 Candie Inman, PT GP 1    [id number]  AALIYAH DEBRIDE OPEN WOUND UP TO 20CM 9/8/2017 Candie Inman, PT 60, GP 1                Candie TOVAR  Storm, PT  9/8/2017

## 2017-09-11 ENCOUNTER — HOSPITAL ENCOUNTER (OUTPATIENT)
Dept: PHYSICAL THERAPY | Facility: HOSPITAL | Age: 72
Setting detail: THERAPIES SERIES
Discharge: HOME | End: 2017-09-11

## 2017-09-11 DIAGNOSIS — S81.801D OPEN WOUND OF RIGHT LOWER EXTREMITY, SUBSEQUENT ENCOUNTER: Primary | ICD-10-CM

## 2017-09-11 PROCEDURE — 97610 LOW FREQUENCY NON-THERMAL US: CPT

## 2017-09-11 PROCEDURE — 97597 DBRDMT OPN WND 1ST 20 CM/<: CPT

## 2017-09-11 NOTE — THERAPY WOUND CARE TREATMENT
Outpatient Rehabilitation - Wound/Debridement Treatment Note  [facility]     Patient Name: Vlad Arellano  : 1945  MRN: [id number]  Today's Date: 2017                Admit Date: 2017    Visit Dx:    ICD-10-CM ICD-9-CM   1. Open wound of right lower extremity, subsequent encounter S81.801D V58.89     891.0       Patient Active Problem List   Diagnosis   • Paroxysmal atrial fibrillation   • Tachy-krishna syndrome   • Benign hypertension   • Lower extremity edema   • PARKER (obstructive sleep apnea)   • Osteoarthritis   • Mild obesity   • MG, ocular (myasthenia gravis)   • Pain   • Hematoma of right thigh   • S/P Debridement irrigation and partial closure of cutaneous tissue right knee        Past Medical History:   Diagnosis Date   • Benign hypertension    • Lower extremity edema    • Mild obesity    • PARKER (obstructive sleep apnea)     requiring CPAP   • Osteoarthritis    • Paroxysmal atrial fibrillation    • Tachy-krishna syndrome         Past Surgical History:   Procedure Laterality Date   • CARDIOVERSION     • INCISION AND DRAINAGE LEG Right 2017    Procedure: INCISION AND DRAINAGE RIGHT KNEE;  Surgeon: Francisco Macias MD;  Location: [facility];  Service:    • NEPHROLITHOTRIPSY PERCUTANEOUS           EVALUATION        PT Ortho       17 0800    Subjective Comments    Subjective Comments Pt without complaints, they did not have to change the dressing since last tx.  -    Subjective Pain    Able to rate subjective pain? yes  -    Pre-Treatment Pain Level 0  -    Post-Treatment Pain Level 0  -    Transfers    Transfer, Comment Pt supine on stretcher for tx  -    Gait Assessment/Treatment    Gait, [place] Level independent  -      17 1030    Subjective Comments    Subjective Comments Pt and spouse report no new issues.  Pt states he was seen by his surgeon this morning and MD was pleased with his progress.  -    Subjective Pain    Able to rate subjective pain?  "yes  -    Pre-Treatment Pain Level 0  -JM    Post-Treatment Pain Level 0  -JM    Transfers    Transfer, Comment Pt supine on stretcher for tx.  -    Gait Assessment/Treatment    Gait, [place] Level independent  -      User Key  (r) = Recorded By, (t) = Taken By, (c) = Cosigned By    Initials Name Provider Type    JULISSA Inman, PT Physical Therapist                    LDA Wound       09/11/17 0800          Incision 08/18/17 1641 Right leg    Incision - Properties Group Placement Date: 08/18/17  - Placement Time: 1641  -JV Side: Right  -JV Location: leg  -JV    Incision WDL ex  -      Dressing Appearance moist drainage;intact  -      Appearance drainage;moist;open areas;pink;redness;sutures intact   90% beefy granulation, 10% yellow fibrous slough  -      Drainage Characteristics/Odor serosanguineous;no odor   blue drainage from HFB dressing  -      Drainage Amount moderate  -      Wound Cleaning cleansed with;other (see comments)   phase one  -      Wound Interventions debrided;other (see comments)   MIST 10 min  -      Dressing Dressing applied;low-adherent;foam   6\" optifoam, spandage  -      Packing Incision packed with;other (see comments)   saline-moistened HFB  -      [REMOVED] Wound 08/18/17 1230 Left lateral heel abrasion    Wound - Properties Group Date first assessed: 08/18/17  -CB Time first assessed: 1230  -CB Side: Left  -CB Orientation: lateral  -CB Location: heel  -CB Type: abrasion  -CB Resolution Date: 09/11/17  - Resolution Time: 0800  -      User Key  (r) = Recorded By, (t) = Taken By, (c) = Cosigned By    Initials Name Provider Type     Demarcus Harris, RN Registered Nurse    JULISSA Inman, PT Physical Therapist    CB Crhistina Pretty, BAKARI Registered Nurse            WOUND DEBRIDEMENT  Debridement Site 1  Location- Site 1: R knee wound  Selective Debridement- Site 1: Wound Surface <20cmsq (2 cm sq)  Instruments- Site 1: tweezers  Excised Tissue " Description- Site 1: minimum, slough  Bleeding- Site 1: none                   Therapy Education       09/11/17 0800          Therapy Education    Given Symptoms/condition management;Bandaging/dressing change;Edema management  -      Program Reinforced  -      How Provided Verbal  -      Provided to Patient  -      Level of Understanding Verbalized  -        User Key  (r) = Recorded By, (t) = Taken By, (c) = Cosigned By    Initials Name Provider Type    JULISSA Inman, PT Physical Therapist          Recommendation and Plan        PT Assessment/Plan       09/11/17 0800       PT Assessment    Functional Limitations Other (comment)   wound mgmt  -     Impairments Integumentary integrity;Edema  -     Assessment Comments New epithelial growth noted at wound edges, less residual slough today.  Wound bed remains bright beefy red, and induration improving.  -     Rehab Potential Good  -     Patient/caregiver participated in establishment of treatment plan and goals Yes  -     Patient would benefit from skilled therapy intervention Yes  -     PT Plan    PT Frequency 2x/week  -     Physical Therapy Interventions (Optional Details) patient/family education;wound care  -     PT Plan Comments MIST, debridement  -       User Key  (r) = Recorded By, (t) = Taken By, (c) = Cosigned By    Initials Name Provider Type    JULISSA Inman, PT Physical Therapist          Goals        PT OP Goals       09/11/17 0800       Time Calculation    PT Goal Re-Cert Due Date 09/22/17  -       User Key  (r) = Recorded By, (t) = Taken By, (c) = Cosigned By    Initials Name Provider Type    JULISSA Inman, PT Physical Therapist          PT Goal Re-Cert Due Date: 09/22/17            Time Calculation: Start Time: 0800    Therapy Charges for Today     Code Description Service Date Service Provider Modifiers Qty    83741652802  PT NLFU MIST 9/11/2017 Candie Inman, PT GP 1    [id number]  AALIYAH DEBRIDE  OPEN WOUND UP TO 20CM 9/11/2017 Candie Inman, PT 60, GP 1                Candie Inman, PT  9/11/2017

## 2017-09-13 ENCOUNTER — HOSPITAL ENCOUNTER (OUTPATIENT)
Dept: PHYSICAL THERAPY | Facility: HOSPITAL | Age: 72
Setting detail: THERAPIES SERIES
Discharge: HOME | End: 2017-09-13

## 2017-09-13 DIAGNOSIS — S81.801D OPEN WOUND OF RIGHT LOWER EXTREMITY, SUBSEQUENT ENCOUNTER: Primary | ICD-10-CM

## 2017-09-13 PROCEDURE — 97610 LOW FREQUENCY NON-THERMAL US: CPT

## 2017-09-13 PROCEDURE — 97597 DBRDMT OPN WND 1ST 20 CM/<: CPT

## 2017-09-13 NOTE — THERAPY WOUND CARE TREATMENT
"    Outpatient Rehabilitation - Wound/Debridement Treatment Note  [facility]     Patient Name: Vlad Arellano  : 1945  MRN: [id number]  Today's Date: 2017                Admit Date: (Not on file)    Visit Dx:    ICD-10-CM ICD-9-CM   1. Open wound of right lower extremity, subsequent encounter S81.801D V58.89     891.0       Patient Active Problem List   Diagnosis   • Paroxysmal atrial fibrillation   • Tachy-krishna syndrome   • Benign hypertension   • Lower extremity edema   • PARKER (obstructive sleep apnea)   • Osteoarthritis   • Mild obesity   • MG, ocular (myasthenia gravis)   • Pain   • Hematoma of right thigh   • S/P Debridement irrigation and partial closure of cutaneous tissue right knee        Past Medical History:   Diagnosis Date   • Benign hypertension    • Lower extremity edema    • Mild obesity    • PARKER (obstructive sleep apnea)     requiring CPAP   • Osteoarthritis    • Paroxysmal atrial fibrillation    • Tachy-krishna syndrome         Past Surgical History:   Procedure Laterality Date   • CARDIOVERSION     • INCISION AND DRAINAGE LEG Right 2017    Procedure: INCISION AND DRAINAGE RIGHT KNEE;  Surgeon: Francisco Macias MD;  Location: [facility];  Service:    • NEPHROLITHOTRIPSY PERCUTANEOUS           EVALUATION        PT Ortho       17 0800    Subjective Comments    Subjective Comments Pt without complaints.  \"I think we can try a smaller bandage this time.\"  States Dr. So was happy with his progress, saw MD yesterday.  -    Subjective Pain    Able to rate subjective pain? yes  -    Pre-Treatment Pain Level 0  -    Post-Treatment Pain Level 0  -    Transfers    Transfer, Comment Pt supine on stretcher for tx.  -    Gait Assessment/Treatment    Gait, [place] Level independent  -      17 0800    Subjective Comments    Subjective Comments Pt without complaints, they did not have to change the dressing since last tx.  -    Subjective Pain    Able to rate " "subjective pain? yes  -    Pre-Treatment Pain Level 0  -    Post-Treatment Pain Level 0  -JM    Transfers    Transfer, Comment Pt supine on stretcher for tx  -    Gait Assessment/Treatment    Gait, [place] Level independent  -      User Key  (r) = Recorded By, (t) = Taken By, (c) = Cosigned By    Initials Name Provider Type     Candie Inman, PT Physical Therapist                    LDA Wound       09/13/17 0800          Incision 08/18/17 1641 Right leg    Incision - Properties Group Placement Date: 08/18/17  - Placement Time: 1641 -JV Side: Right  - Location: leg  -V    Incision WDL ex  -      Dressing Appearance moist drainage;intact  -      Appearance drainage;moist;open areas;pink;redness;sutures intact   90% beefy granulation, 10% yellow fibrous slough  -      Drainage Characteristics/Odor serosanguineous;no odor   blue drainage from HFB dressing  -      Drainage Amount small  -      Wound Cleaning cleansed with;other (see comments)   phase one  -      Wound Interventions debrided;other (see comments)   MIST 10 min  -      Dressing Dressing applied;low-adherent;foam;antimicrobial textile   saline-moistned HFB, 4\" optifoam, hypafix  -      Packing Incision packed with;other (see comments)   saline-moistened HFB  -        User Key  (r) = Recorded By, (t) = Taken By, (c) = Cosigned By    Initials Name Provider Type     Demarcus Harris, RN Registered Nurse    JULISSA Inman, PT Physical Therapist            WOUND DEBRIDEMENT  Debridement Site 1  Location- Site 1: R knee wound  Selective Debridement- Site 1: Wound Surface <20cmsq (1 cm sq)  Instruments- Site 1: tweezers  Excised Tissue Description- Site 1: minimum, slough  Bleeding- Site 1: none                   Therapy Education       09/13/17 0800          Therapy Education    Given Symptoms/condition management;Bandaging/dressing change;Edema management  -      Program Reinforced  -      How Provided Verbal  " -      Provided to Patient  -      Level of Understanding Verbalized  -        User Key  (r) = Recorded By, (t) = Taken By, (c) = Cosigned By    Initials Name Provider Type    JULISSA Inman, PT Physical Therapist          Recommendation and Plan        PT Assessment/Plan       09/13/17 0800       PT Assessment    Functional Limitations Other (comment)   wound mgmt  -     Impairments Integumentary integrity;Edema  -     Assessment Comments Continued new epithelial growth at wound edges, periwound induration improving.  Still with min adherent fibrous slough at inferior edge that requires debridement.  Pt making good progress with current POC.  -     Rehab Potential Good  -     Patient/caregiver participated in establishment of treatment plan and goals Yes  -     Patient would benefit from skilled therapy intervention Yes  -     PT Plan    PT Frequency 2x/week  -     Physical Therapy Interventions (Optional Details) patient/family education;wound care  -     PT Plan Comments MIST, debridement  -       User Key  (r) = Recorded By, (t) = Taken By, (c) = Cosigned By    Initials Name Provider Type    JULISSA Inman, PT Physical Therapist          Goals        PT OP Goals       09/13/17 0800       Time Calculation    PT Goal Re-Cert Due Date 09/22/17  -       User Key  (r) = Recorded By, (t) = Taken By, (c) = Cosigned By    Initials Name Provider Type    JULISSA Inman, PT Physical Therapist          PT Goal Re-Cert Due Date: 09/22/17            Time Calculation: Start Time: 0800    Therapy Charges for Today     Code Description Service Date Service Provider Modifiers Qty    59580182689 HC PT NLFU MIST 9/13/2017 Candie Inman, PT GP 1    [id number] HC AALIYAH DEBRIDE OPEN WOUND UP TO 20CM 9/13/2017 Candie Inman, PT 60, GP 1                Candie Inman, PT  9/13/2017

## 2017-09-15 ENCOUNTER — HOSPITAL ENCOUNTER (OUTPATIENT)
Dept: PHYSICAL THERAPY | Facility: HOSPITAL | Age: 72
Setting detail: THERAPIES SERIES
Discharge: HOME | End: 2017-09-15

## 2017-09-15 DIAGNOSIS — S81.801D OPEN WOUND OF RIGHT LOWER EXTREMITY, SUBSEQUENT ENCOUNTER: Primary | ICD-10-CM

## 2017-09-15 PROCEDURE — 97610 LOW FREQUENCY NON-THERMAL US: CPT

## 2017-09-15 PROCEDURE — 97597 DBRDMT OPN WND 1ST 20 CM/<: CPT

## 2017-09-15 NOTE — THERAPY WOUND CARE TREATMENT
"    Outpatient Rehabilitation - Wound/Debridement Treatment Note  [facility]     Patient Name: Vlad Arellano  : 1945  MRN: [id number]  Today's Date: 9/15/2017                Admit Date: 9/15/2017    Visit Dx:    ICD-10-CM ICD-9-CM   1. Open wound of right lower extremity, subsequent encounter S81.801D V58.89     891.0       Patient Active Problem List   Diagnosis   • Paroxysmal atrial fibrillation   • Tachy-krishna syndrome   • Benign hypertension   • Lower extremity edema   • PARKER (obstructive sleep apnea)   • Osteoarthritis   • Mild obesity   • MG, ocular (myasthenia gravis)   • Pain   • Hematoma of right thigh   • S/P Debridement irrigation and partial closure of cutaneous tissue right knee        Past Medical History:   Diagnosis Date   • Benign hypertension    • Lower extremity edema    • Mild obesity    • PARKER (obstructive sleep apnea)     requiring CPAP   • Osteoarthritis    • Paroxysmal atrial fibrillation    • Tachy-krishna syndrome         Past Surgical History:   Procedure Laterality Date   • CARDIOVERSION     • INCISION AND DRAINAGE LEG Right 2017    Procedure: INCISION AND DRAINAGE RIGHT KNEE;  Surgeon: Francisco Macias MD;  Location: [facility];  Service:    • NEPHROLITHOTRIPSY PERCUTANEOUS           EVALUATION        PT Ortho       09/15/17 0800    Subjective Comments    Subjective Comments Pt states the smaller bandage came off soon after the last tx.  -    Subjective Pain    Able to rate subjective pain? yes  -    Pre-Treatment Pain Level 0  -    Post-Treatment Pain Level 0  -    Transfers    Transfer, Comment Pt supine on stretcher for tx.  -    Gait Assessment/Treatment    Gait, [place] Level independent  -      17 0800    Subjective Comments    Subjective Comments Pt without complaints.  \"I think we can try a smaller bandage this time.\"  States Dr. So was happy with his progress, saw MD yesterday.  -    Subjective Pain    Able to rate subjective pain? " "yes  -    Pre-Treatment Pain Level 0  -    Post-Treatment Pain Level 0  -JM    Transfers    Transfer, Comment Pt supine on stretcher for tx.  -    Gait Assessment/Treatment    Gait, [place] Level independent  -      User Key  (r) = Recorded By, (t) = Taken By, (c) = Cosigned By    Initials Name Provider Type    JULISSA Inman, PT Physical Therapist                    LDA Wound       09/15/17 0800          Incision 08/18/17 1641 Right leg    Incision - Properties Group Placement Date: 08/18/17  - Placement Time: 1641 -JV Side: Right  - Location: leg  -JV    Incision WDL ex  -      Dressing Appearance moist drainage;intact  -      Appearance drainage;moist;open areas;pink;redness;sutures intact   90% beefy granulation, 10% yellow fibrous slough  -      Drainage Characteristics/Odor serosanguineous;no odor   blue drainage from HFB dressing  -      Drainage Amount small  -      Wound Cleaning cleansed with;other (see comments)   phase one  -      Wound Interventions debrided;other (see comments)   MIST 10 min  -      Dressing Dressing applied;antimicrobial textile;low-adherent;foam   saline- moistened HFB, 6\" optifoam, spandage  -      Packing Incision packed with;other (see comments)   saline-moistened HFB  -        User Key  (r) = Recorded By, (t) = Taken By, (c) = Cosigned By    Initials Name Provider Type     Demarcus Harris, RN Registered Nurse    JULISSA Inman, PT Physical Therapist            WOUND DEBRIDEMENT  Debridement Site 1  Location- Site 1: R knee wound  Selective Debridement- Site 1: Wound Surface <20cmsq (1 cmsq)  Instruments- Site 1: tweezers  Excised Tissue Description- Site 1: scant, slough  Bleeding- Site 1: seeping, held pressure, 1 minute                   Therapy Education       09/15/17 0800          Therapy Education    Education Details Continue with current tx and home dressing change PRN  -      Given Symptoms/condition " management;Bandaging/dressing change;Edema management  -      Program Reinforced  -      How Provided Verbal  -      Provided to Patient  -      Level of Understanding Verbalized  -        User Key  (r) = Recorded By, (t) = Taken By, (c) = Cosigned By    Initials Name Provider Type    JULISSA Inman, PT Physical Therapist          Recommendation and Plan        PT Assessment/Plan       09/15/17 0800       PT Assessment    Functional Limitations Other (comment)   wound mgmt  -     Impairments Integumentary integrity;Edema  -     Assessment Comments Less slough on wound bed today, still some residual fibrous tissue at inferior aspect.  Continued new epithelial growth from edges.  -     Rehab Potential Good  -     Patient/caregiver participated in establishment of treatment plan and goals Yes  -     Patient would benefit from skilled therapy intervention Yes  -     PT Plan    PT Frequency 2x/week  -     Physical Therapy Interventions (Optional Details) patient/family education;wound care  -     PT Plan Comments MIST, debridement  -       User Key  (r) = Recorded By, (t) = Taken By, (c) = Cosigned By    Initials Name Provider Type    JULISSA Inman, PT Physical Therapist          Goals        PT OP Goals       09/15/17 0800       Time Calculation    PT Goal Re-Cert Due Date 09/22/17  -       User Key  (r) = Recorded By, (t) = Taken By, (c) = Cosigned By    Initials Name Provider Type    JULISSA Inman, PT Physical Therapist          PT Goal Re-Cert Due Date: 09/22/17            Time Calculation: Start Time: 0800    Therapy Charges for Today     Code Description Service Date Service Provider Modifiers Qty    01644858891 HC PT NLFU MIST 9/15/2017 Candie Inman, PT GP 1    [id number] HC AALIYAH DEBRIDE OPEN WOUND UP TO 20CM 9/15/2017 Candie Inman, PT 60, GP 1                Candie Inman, PT  9/15/2017

## 2017-09-18 ENCOUNTER — HOSPITAL ENCOUNTER (OUTPATIENT)
Dept: PHYSICAL THERAPY | Facility: HOSPITAL | Age: 72
Setting detail: THERAPIES SERIES
Discharge: HOME | End: 2017-09-18

## 2017-09-18 DIAGNOSIS — S81.801D OPEN WOUND OF RIGHT LOWER EXTREMITY, SUBSEQUENT ENCOUNTER: Primary | ICD-10-CM

## 2017-09-18 PROCEDURE — 97597 DBRDMT OPN WND 1ST 20 CM/<: CPT

## 2017-09-18 PROCEDURE — G8990 OTHER PT/OT CURRENT STATUS: HCPCS

## 2017-09-18 PROCEDURE — G8991 OTHER PT/OT GOAL STATUS: HCPCS

## 2017-09-18 PROCEDURE — 97610 LOW FREQUENCY NON-THERMAL US: CPT

## 2017-09-18 NOTE — THERAPY PROGRESS REPORT/RE-CERT
Outpatient Rehabilitation - Wound/Debridement Progress Note  [facility]     Patient Name: Vlad Arellano  : 1945  MRN: [id number]  Today's Date: 2017                Admit Date: 2017    Visit Dx:    ICD-10-CM ICD-9-CM   1. Open wound of right lower extremity, subsequent encounter S81.801D V58.89     891.0   Medial R knee/thigh:      Patient Active Problem List   Diagnosis   • Paroxysmal atrial fibrillation   • Tachy-krishna syndrome   • Benign hypertension   • Lower extremity edema   • PARKER (obstructive sleep apnea)   • Osteoarthritis   • Mild obesity   • MG, ocular (myasthenia gravis)   • Pain   • Hematoma of right thigh   • S/P Debridement irrigation and partial closure of cutaneous tissue right knee        Past Medical History:   Diagnosis Date   • Benign hypertension    • Lower extremity edema    • Mild obesity    • PARKER (obstructive sleep apnea)     requiring CPAP   • Osteoarthritis    • Paroxysmal atrial fibrillation    • Tachy-krishna syndrome         Past Surgical History:   Procedure Laterality Date   • CARDIOVERSION     • INCISION AND DRAINAGE LEG Right 2017    Procedure: INCISION AND DRAINAGE RIGHT KNEE;  Surgeon: Francisco Macias MD;  Location: [facility];  Service:    • NEPHROLITHOTRIPSY PERCUTANEOUS           EVALUATION        PT Ortho       17 0800    Subjective Comments    Subjective Comments Pt without complaints.  -    Subjective Pain    Able to rate subjective pain? yes  -    Pre-Treatment Pain Level 0  -    Post-Treatment Pain Level 0  -    Transfers    Transfer, Comment Pt supine on stretcher for tx.  -    Gait Assessment/Treatment    Gait, [place] Level independent  -      User Key  (r) = Recorded By, (t) = Taken By, (c) = Cosigned By    Initials Name Provider Type    JULISSA Inman, PT Physical Therapist                    LDA Wound       17 0800          Incision 17 1641 Right leg    Incision - Properties Group Placement  "Date: 08/18/17  - Placement Time: 1641 -JV Side: Right  -J Location: leg  -JV    Incision WDL ex  -JM      Dressing Appearance moist drainage;intact  -JM      Appearance drainage;moist;open areas;pink;redness;sutures intact   90% beefy granulation, 10% yellow fibrous slough  -JM      Incision Length (cm) 3.5  -JM      Incision Width (cm) 1.6  -JM      Incision Depth (cm) 0.2  -JM      Tunneling [depth (cm)/Location] 4.0cm@12:00  -      Drainage Characteristics/Odor serosanguineous;no odor   blue drainage from HFB dressing  -JM      Drainage Amount small  -JM      Wound Cleaning cleansed with;other (see comments)   phase one  -      Wound Interventions debrided;other (see comments)   MIST 10 min  -      Dressing Dressing applied;antimicrobial textile;low-adherent;foam   saline-moistened HFB, 6\" optifoam, spandage  -      Packing Incision packed with;other (see comments)   saline-moistened HFB  -        User Key  (r) = Recorded By, (t) = Taken By, (c) = Cosigned By    Initials Name Provider Type     Demarcus Harris, RN Registered Nurse    JULISSA Inman, PT Physical Therapist            WOUND DEBRIDEMENT  Debridement Site 1  Location- Site 1: R knee wound  Selective Debridement- Site 1: Wound Surface <20cmsq (1 cm sq)  Instruments- Site 1: tweezers  Excised Tissue Description- Site 1: scant, slough, minimum, other (comment) (hypertrophic crust)  Bleeding- Site 1: none                   Therapy Education       09/18/17 0800          Therapy Education    Education Details Continuation of tx 2x/week for 3-4 weeks  -JM      Given Symptoms/condition management;Bandaging/dressing change;Edema management  -      Program Reinforced  -JULISSA      How Provided Verbal  -JM      Provided to Patient  -JM      Level of Understanding Verbalized  -        User Key  (r) = Recorded By, (t) = Taken By, (c) = Cosigned By    Initials Name Provider Type    JULISSA Inman, PT Physical Therapist    "       Recommendation and Plan        PT Assessment/Plan       09/18/17 0800       PT Assessment    Functional Limitations Other (comment)   wound mgmt  -     Impairments Integumentary integrity;Edema  -     Assessment Comments Pt has met all STGs, ongoing for LTGs, but making good progress with decrease in wound area and decreased depth of tunnel at 12:00.  Wound bed 90% beefy red granulation.  Pt will continue to benefit from skilled PT for MIST and debridement.  -     Rehab Potential Good  -     Patient/caregiver participated in establishment of treatment plan and goals Yes  -     Patient would benefit from skilled therapy intervention Yes  -     PT Plan    PT Frequency 2x/week  -     Predicted Duration of Therapy Intervention (days/wks) 8 visits  -     Planned CPT's? PT NLFU MIST: 34788;PT AALIYAH DEBRIDE OPEN WOUND UP TO 20 CM: 02587;PT NONSELECT DEBRIDE 15 MIN: 56617;PT THER PROC EA 15 MIN: 59103  -     Physical Therapy Interventions (Optional Details) wound care;patient/family education  -     PT Plan Comments MIST, debridement  -       User Key  (r) = Recorded By, (t) = Taken By, (c) = Cosigned By    Initials Name Provider Type    JULISSA Inman, PT Physical Therapist          Goals        PT OP Goals       09/18/17 0800       PT Short Term Goals    STG 1 Patient and/ or caregiver able to verbalize signs and symptoms of infection.  -     STG 1 Progress Met  -     STG 2 Decrease wound dimensions by 25% as evidence of wound closure.  -     STG 2 Progress Met  -     STG 3 Pt will demonstrate 50% reduction in induration of periwound to indicate progress.  -     STG 3 Progress Met  -     STG 4 Pt will demonstrate reduction in tunneled area by 2 cm to indicate progress.  -     STG 4 Progress Met  -     Long Term Goals    LTG 1 Patient and/ or caregiver independent with clean dressing changes.  -     LTG 1 Progress Partially Met  -     LTG 2 Decrease wound dimensions by  75% as evidence of wound closure.  -     LTG 2 Progress Ongoing  -     LTG 3 Pt will demonstrate 75% reduction in periwound induration to indicate progress.  -     LTG 3 Progress Ongoing  -     LTG 4 Pt will demonstrate reduction in tunneled area by 6 cm to indicate healing progress.  -     LTG 4 Progress Ongoing  -     Time Calculation    PT Goal Re-Cert Due Date 12/17/17  -       User Key  (r) = Recorded By, (t) = Taken By, (c) = Cosigned By    Initials Name Provider Type    JULISSA Inman, PT Physical Therapist              Time Calculation: Start Time: 0800    Therapy Charges for Today     Code Description Service Date Service Provider Modifiers Qty    88380771972 HC PT OTHER PRIME FUNCT CURRENT 9/18/2017 Candie Inman, PT GP, CK 1    51276605982 HC PT OTHER PRIME FUNCT PROJECTED 9/18/2017 Candie Inman, PT GP, CH 1    19129687413 HC PT NLFU MIST 9/18/2017 Candie Inman, PT GP 1    05809692627 HC AALIYAH DEBRIDE OPEN WOUND UP TO 20CM 9/18/2017 Candie Inman, PT 60, GP 1          PT G-Codes  Outcome Measure Options: BWAT ([place] Wound Assess Tool)  Score: BWAT 35, 50% limitation in wound management  Functional Limitation: Other PT primary  Other PT Primary Current Status (): At least 40 percent but less than 60 percent impaired, limited or restricted  Other PT Primary Goal Status (): 0 percent impaired, limited or restricted     Candie Inman, PT  9/18/2017

## 2017-09-20 ENCOUNTER — HOSPITAL ENCOUNTER (OUTPATIENT)
Dept: PHYSICAL THERAPY | Facility: HOSPITAL | Age: 72
Setting detail: THERAPIES SERIES
Discharge: HOME | End: 2017-09-20

## 2017-09-20 DIAGNOSIS — S81.801D OPEN WOUND OF RIGHT LOWER EXTREMITY, SUBSEQUENT ENCOUNTER: Primary | ICD-10-CM

## 2017-09-20 PROCEDURE — 97610 LOW FREQUENCY NON-THERMAL US: CPT

## 2017-09-20 PROCEDURE — 97597 DBRDMT OPN WND 1ST 20 CM/<: CPT

## 2017-09-20 NOTE — THERAPY WOUND CARE TREATMENT
Outpatient Rehabilitation - Wound/Debridement Treatment Note  [facility]     Patient Name: Vlad Arellano  : 1945  MRN: [id number]  Today's Date: 2017                Admit Date: 2017    Visit Dx:    ICD-10-CM ICD-9-CM   1. Open wound of right lower extremity, subsequent encounter S81.801D V58.89     891.0       Patient Active Problem List   Diagnosis   • Paroxysmal atrial fibrillation   • Tachy-krishna syndrome   • Benign hypertension   • Lower extremity edema   • PARKER (obstructive sleep apnea)   • Osteoarthritis   • Mild obesity   • MG, ocular (myasthenia gravis)   • Pain   • Hematoma of right thigh   • S/P Debridement irrigation and partial closure of cutaneous tissue right knee        Past Medical History:   Diagnosis Date   • Benign hypertension    • Lower extremity edema    • Mild obesity    • PARKER (obstructive sleep apnea)     requiring CPAP   • Osteoarthritis    • Paroxysmal atrial fibrillation    • Tachy-krishna syndrome         Past Surgical History:   Procedure Laterality Date   • CARDIOVERSION     • INCISION AND DRAINAGE LEG Right 2017    Procedure: INCISION AND DRAINAGE RIGHT KNEE;  Surgeon: Francisco Macias MD;  Location: [facility];  Service:    • NEPHROLITHOTRIPSY PERCUTANEOUS           EVALUATION        PT Ortho       17 0800    Subjective Comments    Subjective Comments Pt without complaints.  -    Subjective Pain    Able to rate subjective pain? yes  -    Pre-Treatment Pain Level 0  -    Post-Treatment Pain Level 0  -    Transfers    Transfer, Comment Pt supine on stretcher for tx.  -    Gait Assessment/Treatment    Gait, [place] Level independent  -      17 0800    Subjective Comments    Subjective Comments Pt without complaints.  -    Subjective Pain    Able to rate subjective pain? yes  -    Pre-Treatment Pain Level 0  -    Post-Treatment Pain Level 0  -    Transfers    Transfer, Comment Pt supine on stretcher for tx.  -    Gait  "Assessment/Treatment    Gait, [place] Level independent  -      User Key  (r) = Recorded By, (t) = Taken By, (c) = Cosigned By    Initials Name Provider Type     Candie Inman, PT Physical Therapist                    LDA Wound       09/20/17 0800          Incision 08/18/17 1641 Right leg    Incision - Properties Group Placement Date: 08/18/17  -JV Placement Time: 1641 -JV Side: Right  -JV Location: leg  -JV    Incision WDL ex  -JM      Dressing Appearance moist drainage;intact  -JM      Appearance drainage;moist;open areas;pink;redness;sutures intact   90% beefy granulation, 10% yellow fibrous slough  -      Drainage Characteristics/Odor serosanguineous;no odor   blue drainage from HFB dressing  -      Drainage Amount small  -      Wound Cleaning cleansed with;other (see comments)   phase one  -      Wound Interventions debrided;other (see comments)   MIST 10 min  -      Dressing Dressing applied;antimicrobial textile;low-adherent;foam   saline-moistened HFB, 6\" optifoam  -      Packing Incision packed with;other (see comments)   saline-moistened HFB  -        User Key  (r) = Recorded By, (t) = Taken By, (c) = Cosigned By    Initials Name Provider Type     Demarcus Harris, RN Registered Nurse    JULISSA Inman, PT Physical Therapist            WOUND DEBRIDEMENT  Debridement Site 1  Location- Site 1: R knee wound  Selective Debridement- Site 1: Wound Surface <20cmsq (2 cm sq)  Instruments- Site 1: tweezers  Excised Tissue Description- Site 1: minimum, other (comment), scant, slough (fibrous yellow in prox aspect)  Bleeding- Site 1: none                   Therapy Education       09/20/17 0800          Therapy Education    Given Symptoms/condition management;Bandaging/dressing change;Edema management  -      Program Reinforced  -      How Provided Verbal  -JM      Provided to Patient  -      Level of Understanding Verbalized  -        User Key  (r) = Recorded By, (t) = Taken " By, (c) = Cosigned By    Initials Name Provider Type    JULISSA Inman, PT Physical Therapist          Recommendation and Plan        PT Assessment/Plan       09/20/17 0800       PT Assessment    Functional Limitations Other (comment)   wound mgmt  -     Impairments Integumentary integrity;Edema  -     Assessment Comments Wound with continued new epithelial growth from med/lat edges.  Scant undermining now present inferior/med aspect as slough has been debrided.  Induration improving.  -     Rehab Potential Good  -     Patient/caregiver participated in establishment of treatment plan and goals Yes  -     Patient would benefit from skilled therapy intervention Yes  -     PT Plan    PT Frequency 2x/week  -     Physical Therapy Interventions (Optional Details) patient/family education;wound care  -     PT Plan Comments MIST, debridement  -       User Key  (r) = Recorded By, (t) = Taken By, (c) = Cosigned By    Initials Name Provider Type    JULISSA Inman, PT Physical Therapist          Goals        PT OP Goals       09/20/17 0800       Time Calculation    PT Goal Re-Cert Due Date 12/17/17  -       User Key  (r) = Recorded By, (t) = Taken By, (c) = Cosigned By    Initials Name Provider Type    JULISSA Inman, PT Physical Therapist          PT Goal Re-Cert Due Date: 12/17/17            Time Calculation: Start Time: 0800    Therapy Charges for Today     Code Description Service Date Service Provider Modifiers Qty    60237515117 HC PT NLFU MIST 9/20/2017 Candie Inman, PT GP 1    44012000136 HC AALIYAH DEBRIDE OPEN WOUND UP TO 20CM 9/20/2017 Candie Inman, PT 60, GP 1                Candie Inman, PT  9/20/2017

## 2017-09-22 ENCOUNTER — HOSPITAL ENCOUNTER (OUTPATIENT)
Dept: PHYSICAL THERAPY | Facility: HOSPITAL | Age: 72
Setting detail: THERAPIES SERIES
Discharge: HOME | End: 2017-09-22

## 2017-09-22 DIAGNOSIS — S81.801D OPEN WOUND OF RIGHT LOWER EXTREMITY, SUBSEQUENT ENCOUNTER: Primary | ICD-10-CM

## 2017-09-22 PROCEDURE — 97597 DBRDMT OPN WND 1ST 20 CM/<: CPT

## 2017-09-22 PROCEDURE — 97610 LOW FREQUENCY NON-THERMAL US: CPT

## 2017-09-22 NOTE — THERAPY WOUND CARE TREATMENT
Outpatient Rehabilitation - Wound/Debridement Treatment Note   [place]     Patient Name: Vlad Arellano  : 1945  MRN: [id number]  Today's Date: 2017                Admit Date: 2017    Visit Dx:    ICD-10-CM ICD-9-CM   1. Open wound of right lower extremity, subsequent encounter S81.801D V58.89     891.0       Patient Active Problem List   Diagnosis   • Paroxysmal atrial fibrillation   • Tachy-krishna syndrome   • Benign hypertension   • Lower extremity edema   • PARKER (obstructive sleep apnea)   • Osteoarthritis   • Mild obesity   • MG, ocular (myasthenia gravis)   • Pain   • Hematoma of right thigh   • S/P Debridement irrigation and partial closure of cutaneous tissue right knee        Past Medical History:   Diagnosis Date   • Benign hypertension    • Lower extremity edema    • Mild obesity    • PARKER (obstructive sleep apnea)     requiring CPAP   • Osteoarthritis    • Paroxysmal atrial fibrillation    • Tachy-krishna syndrome         Past Surgical History:   Procedure Laterality Date   • CARDIOVERSION     • INCISION AND DRAINAGE LEG Right 2017    Procedure: INCISION AND DRAINAGE RIGHT KNEE;  Surgeon: Francisco Macias MD;  Location: [facility];  Service:    • NEPHROLITHOTRIPSY PERCUTANEOUS           EVALUATION        PT Ortho       17 0800    Subjective Comments    Subjective Comments No complaints today  -    Subjective Pain    Able to rate subjective pain? yes  -    Pre-Treatment Pain Level 0  -    Post-Treatment Pain Level 0  -    Transfers    Transfer, Comment Pt supine on stretcher for tx  -    Gait Assessment/Treatment    Gait, [place] Level independent  -      17 0800    Subjective Comments    Subjective Comments Pt without complaints.  -    Subjective Pain    Able to rate subjective pain? yes  -    Pre-Treatment Pain Level 0  -    Post-Treatment Pain Level 0  -    Transfers    Transfer, Comment Pt supine on stretcher for tx.  -    Gait  "Assessment/Treatment    Gait, [place] Level independent  -      User Key  (r) = Recorded By, (t) = Taken By, (c) = Cosigned By    Initials Name Provider Type    JULISSA Inman, PT Physical Therapist                    LDA Wound       09/22/17 0800          Incision 08/18/17 1641 Right leg    Incision - Properties Group Placement Date: 08/18/17  -JV Placement Time: 1641 -JV Side: Right  -JV Location: leg  -JV    Incision WDL ex  -JM      Dressing Appearance moist drainage;intact  -JM      Appearance drainage;moist;open areas;pink;redness   90% beefy granulation, 10% yellow fibrous slough  -JM      Drainage Characteristics/Odor serosanguineous;no odor   blue drainage from HFB dressing  -JM      Drainage Amount small  -JM      Wound Cleaning cleansed with;other (see comments)   phase one  -      Wound Interventions debrided;other (see comments)   MIST 10 min  -      Dressing Dressing applied;low-adherent;foam   6\" optifoam gentle  -JM      Packing other (see comments)   saline-moistened HFB  -        User Key  (r) = Recorded By, (t) = Taken By, (c) = Cosigned By    Initials Name Provider Type     Demarcus Harris, RN Registered Nurse    JULISSA Inman, PT Physical Therapist            WOUND DEBRIDEMENT  Debridement Site 1  Location- Site 1: R knee wound  Selective Debridement- Site 1: Wound Surface <20cmsq (1 cm sq)  Instruments- Site 1: tweezers  Excised Tissue Description- Site 1: minimum, slough  Bleeding- Site 1: none                   Therapy Education       09/22/17 0800          Therapy Education    Given Symptoms/condition management;Bandaging/dressing change;Edema management  -      Program Reinforced  -      How Provided Verbal  -JM      Provided to Patient  -JM      Level of Understanding Verbalized  -        User Key  (r) = Recorded By, (t) = Taken By, (c) = Cosigned By    Initials Name Provider Type    JULISSA Inman, PT Physical Therapist          Recommendation and " Plan        PT Assessment/Plan       09/22/17 0800       PT Assessment    Functional Limitations Other (comment)   wound mgmt  -     Impairments Integumentary integrity;Edema  -     Assessment Comments Continuing new epithelial growth, tunnel decreasing and induration improved today.  -     Rehab Potential Good  -     PT Plan    PT Frequency 2x/week  -     Physical Therapy Interventions (Optional Details) patient/family education;wound care  -     PT Plan Comments MIST, debridement  -       User Key  (r) = Recorded By, (t) = Taken By, (c) = Cosigned By    Initials Name Provider Type    JULISSA Inman, PT Physical Therapist          Goals        PT OP Goals       09/22/17 0800       Time Calculation    PT Goal Re-Cert Due Date 12/17/17  -       User Key  (r) = Recorded By, (t) = Taken By, (c) = Cosigned By    Initials Name Provider Type    JULISSA Inman, PT Physical Therapist          PT Goal Re-Cert Due Date: 12/17/17            Time Calculation: Start Time: 0800    Therapy Charges for Today     Code Description Service Date Service Provider Modifiers Qty    27026524598  PT NLFU MIST 9/22/2017 Candie Inman, PT GP 1    89078549936 HC AALIYAH DEBRIDE OPEN WOUND UP TO 20CM 9/22/2017 Candie Inman, PT 60, GP 1                Candie Inman, PT  9/22/2017

## 2017-09-25 ENCOUNTER — HOSPITAL ENCOUNTER (OUTPATIENT)
Dept: PHYSICAL THERAPY | Facility: HOSPITAL | Age: 72
Setting detail: THERAPIES SERIES
Discharge: HOME | End: 2017-09-25

## 2017-09-25 DIAGNOSIS — S81.801D OPEN WOUND OF RIGHT LOWER EXTREMITY, SUBSEQUENT ENCOUNTER: Primary | ICD-10-CM

## 2017-09-25 PROCEDURE — 97597 DBRDMT OPN WND 1ST 20 CM/<: CPT

## 2017-09-25 PROCEDURE — 97610 LOW FREQUENCY NON-THERMAL US: CPT

## 2017-09-25 NOTE — THERAPY WOUND CARE TREATMENT
"    Outpatient Rehabilitation - Wound/Debridement Treatment Note  [facility]     Patient Name: Vlad Arellano  : 1945  MRN: [id number]  Today's Date: 2017                Admit Date: 2017    Visit Dx:    ICD-10-CM ICD-9-CM   1. Open wound of right lower extremity, subsequent encounter S81.801D V58.89     891.0       Patient Active Problem List   Diagnosis   • Paroxysmal atrial fibrillation   • Tachy-krishna syndrome   • Benign hypertension   • Lower extremity edema   • PARKER (obstructive sleep apnea)   • Osteoarthritis   • Mild obesity   • MG, ocular (myasthenia gravis)   • Pain   • Hematoma of right thigh   • S/P Debridement irrigation and partial closure of cutaneous tissue right knee        Past Medical History:   Diagnosis Date   • Benign hypertension    • Lower extremity edema    • Mild obesity    • PARKER (obstructive sleep apnea)     requiring CPAP   • Osteoarthritis    • Paroxysmal atrial fibrillation    • Tachy-krishna syndrome         Past Surgical History:   Procedure Laterality Date   • CARDIOVERSION     • INCISION AND DRAINAGE LEG Right 2017    Procedure: INCISION AND DRAINAGE RIGHT KNEE;  Surgeon: Francisco Macias MD;  Location: [facility];  Service:    • NEPHROLITHOTRIPSY PERCUTANEOUS           EVALUATION        PT Ortho       17 0800    Subjective Comments    Subjective Comments Pt without complaints.  Wife states \"It's healing a lot faster than I thought it would.\"  -    Subjective Pain    Able to rate subjective pain? yes  -    Pre-Treatment Pain Level 0  -    Post-Treatment Pain Level 0  -    Transfers    Transfer, Comment Pt supine on stretcher for tx.  -    Gait Assessment/Treatment    Gait, [place] Level independent  -      User Key  (r) = Recorded By, (t) = Taken By, (c) = Cosigned By    Initials Name Provider Type    JULISSA Inman, PT Physical Therapist                    LDA Wound       17 0800          Incision 17 1641 Right leg "    Incision - Properties Group Placement Date: 08/18/17  -JV Placement Time: 1641 -JV Side: Right  -J Location: leg  -JV    Incision WDL ex  -JM      Dressing Appearance moist drainage;intact  -JM      Appearance drainage;moist;open areas;pink;redness   90% beefy granulation, 10% yellow fibrous slough  -JM      Incision Length (cm) 3.1  -JM      Incision Width (cm) 1.1  -JM      Incision Depth (cm) 0.1  -JM      Tunneling [depth (cm)/Location] 2.5cm@12:00  -JM      Drainage Characteristics/Odor serosanguineous;no odor   blue drainage from HFB dressing  -JM      Drainage Amount small  -JM      Wound Cleaning cleansed with;other (see comments)   phase one  -      Wound Interventions debrided;other (see comments)   MIST x10 min  -      Dressing Dressing applied;low-adherent;foam  -JM      Packing Incision packed with;other (see comments)   saline-moistened HFB  -        User Key  (r) = Recorded By, (t) = Taken By, (c) = Cosigned By    Initials Name Provider Type    MARY ANN Harris, RN Registered Nurse    JULISSA Inman, PT Physical Therapist            WOUND DEBRIDEMENT  Debridement Site 1  Location- Site 1: R knee wound  Selective Debridement- Site 1: Wound Surface <20cmsq (1 cm sq)  Instruments- Site 1: tweezers  Excised Tissue Description- Site 1: minimum, slough, other (comment) (fibrous yellow tissue inferior aspect)  Bleeding- Site 1: none                   Therapy Education       09/25/17 0800          Therapy Education    Education Details Continue with current POC.  -JM      Given Symptoms/condition management;Bandaging/dressing change;Edema management  -      Program Reinforced  -JULISSA      How Provided Verbal  -JM      Provided to Patient  -JULISSA      Level of Understanding Verbalized  -        User Key  (r) = Recorded By, (t) = Taken By, (c) = Cosigned By    Initials Name Provider Type    JULISSA Inman, PT Physical Therapist          Recommendation and Plan        PT Assessment/Plan        09/25/17 0800       PT Assessment    Functional Limitations Other (comment)   wound mgmt  -     Impairments Integumentary integrity;Edema  -     Assessment Comments Wound area and depth decreasing.  Still with min fibrous slough inferiorly requiring debridement.  Pt will continue to benefit from MIST and debridement to promote wound healing.  -     Rehab Potential Good  -     Patient/caregiver participated in establishment of treatment plan and goals Yes  -     Patient would benefit from skilled therapy intervention Yes  -     PT Plan    PT Frequency 2x/week  -     Physical Therapy Interventions (Optional Details) patient/family education;wound care  -     PT Plan Comments MIST, debridement  -       User Key  (r) = Recorded By, (t) = Taken By, (c) = Cosigned By    Initials Name Provider Type     Candie Inman, PT Physical Therapist          Goals        PT OP Goals       09/25/17 0800       Time Calculation    PT Goal Re-Cert Due Date 12/17/17  -       User Key  (r) = Recorded By, (t) = Taken By, (c) = Cosigned By    Initials Name Provider Type     Candie Inman, PT Physical Therapist          PT Goal Re-Cert Due Date: 12/17/17            Time Calculation: Start Time: 0800    Therapy Charges for Today     Code Description Service Date Service Provider Modifiers Qty    62935904201 HC PT NLFU MIST 9/25/2017 Candie Inman, PT GP 1    [id number] HC AALIYAH DEBRIDE OPEN WOUND UP TO 20CM 9/25/2017 Candie Inman, PT 60, GP 1                Candie Inman, PT  9/25/2017

## 2017-09-27 ENCOUNTER — HOSPITAL ENCOUNTER (OUTPATIENT)
Dept: PHYSICAL THERAPY | Facility: HOSPITAL | Age: 72
Setting detail: THERAPIES SERIES
Discharge: HOME | End: 2017-09-27

## 2017-09-27 DIAGNOSIS — S81.801D OPEN WOUND OF RIGHT LOWER EXTREMITY, SUBSEQUENT ENCOUNTER: Primary | ICD-10-CM

## 2017-09-27 PROCEDURE — 97610 LOW FREQUENCY NON-THERMAL US: CPT | Performed by: PHYSICAL THERAPIST

## 2017-09-29 ENCOUNTER — HOSPITAL ENCOUNTER (OUTPATIENT)
Dept: PHYSICAL THERAPY | Facility: HOSPITAL | Age: 72
Setting detail: THERAPIES SERIES
Discharge: HOME | End: 2017-09-29

## 2017-09-29 DIAGNOSIS — S81.801D OPEN WOUND OF RIGHT LOWER EXTREMITY, SUBSEQUENT ENCOUNTER: Primary | ICD-10-CM

## 2017-09-29 PROCEDURE — 97610 LOW FREQUENCY NON-THERMAL US: CPT

## 2017-09-29 PROCEDURE — 97597 DBRDMT OPN WND 1ST 20 CM/<: CPT

## 2017-09-29 NOTE — THERAPY WOUND CARE TREATMENT
"    Outpatient Rehabilitation - Wound/Debridement Treatment Note  [facility]     Patient Name: Vlad Arellano  : 1945  MRN: [id number]  Today's Date: 2017                Admit Date: 2017    Visit Dx:    ICD-10-CM ICD-9-CM   1. Open wound of right lower extremity, subsequent encounter S81.801D V58.89     891.0       Patient Active Problem List   Diagnosis   • Paroxysmal atrial fibrillation   • Tachy-krishna syndrome   • Benign hypertension   • Lower extremity edema   • PARKER (obstructive sleep apnea)   • Osteoarthritis   • Mild obesity   • MG, ocular (myasthenia gravis)   • Pain   • Hematoma of right thigh   • S/P Debridement irrigation and partial closure of cutaneous tissue right knee        Past Medical History:   Diagnosis Date   • Benign hypertension    • Lower extremity edema    • Mild obesity    • PARKER (obstructive sleep apnea)     requiring CPAP   • Osteoarthritis    • Paroxysmal atrial fibrillation    • Tachy-krishna syndrome         Past Surgical History:   Procedure Laterality Date   • CARDIOVERSION     • INCISION AND DRAINAGE LEG Right 2017    Procedure: INCISION AND DRAINAGE RIGHT KNEE;  Surgeon: Francisco Macias MD;  Location: [facility];  Service:    • NEPHROLITHOTRIPSY PERCUTANEOUS           EVALUATION        PT Ortho       17 0800    Subjective Comments    Subjective Comments Pt states no issues with wound, but he is on antibiotics for a sinus infection and states \"They don't seem to be working.\"  c/o congestion and fatigue.  -    Subjective Pain    Able to rate subjective pain? yes  -    Pre-Treatment Pain Level 0  -    Post-Treatment Pain Level 0  -    Transfers    Transfer, Comment Pt supine on stretcher for tx.  -    Gait Assessment/Treatment    Gait, [place] Level independent  -      17 1500    Subjective Comments    Subjective Comments Wife concerned they are late to meeting grand daughter. Pt states Dr Curtis was pleased with wound " "appearance; follow up in 4 wks if needed.   -MW    Subjective Pain    Able to rate subjective pain? yes  -MW    Pre-Treatment Pain Level 0  -MW    Post-Treatment Pain Level 0  -MW    Transfers    Transfer, Comment Pt supine on stretcher for tx.  -MW    Gait Assessment/Treatment    Gait, [place] Level independent  -MW      User Key  (r) = Recorded By, (t) = Taken By, (c) = Cosigned By    Initials Name Provider Type     Ramona Ellis, PT Physical Therapist    JULISSA Inman, PT Physical Therapist                    LDA Wound       09/29/17 0800          Incision 08/18/17 1641 Right leg    Incision - Properties Group Placement Date: 08/18/17  -JV Placement Time: 1641 -JV Side: Right  -J Location: leg  -JV    Incision WDL ex  -JM      Dressing Appearance moist drainage;intact  -JM      Appearance drainage;moist;open areas;pink   thin biofilm on areas not covered by HFB  -      Drainage Characteristics/Odor serosanguineous;no odor   blue drainage from HFB dressing  -JM      Drainage Amount small  -      Wound Cleaning cleansed with;other (see comments)   phase one  -      Wound Interventions debrided;other (see comments)   MIST 10 min  -      Dressing Dressing applied;low-adherent;foam   6\" optifoam gentle  -      Packing Incision packed with;other (see comments)   saline-moistened HFB classic  -        User Key  (r) = Recorded By, (t) = Taken By, (c) = Cosigned By    Initials Name Provider Type     Demarcus Harris, RN Registered Nurse    JULISSA Inman, PT Physical Therapist            WOUND DEBRIDEMENT  Debridement Site 1  Location- Site 1: R knee wound  Selective Debridement- Site 1: Wound Surface <20cmsq (0.5 cmsq)  Instruments- Site 1: tweezers, scissors  Excised Tissue Description- Site 1: minimum, other (comment) (biofilm, roughened rolling wound edges)  Bleeding- Site 1: scant, held pressure, 1 minute                   Therapy Education       09/29/17 0800          Therapy " Education    Given Symptoms/condition management;Bandaging/dressing change  -      Program Reinforced  -      How Provided Verbal  -      Provided to Patient  -      Level of Understanding Verbalized  -        User Key  (r) = Recorded By, (t) = Taken By, (c) = Cosigned By    Initials Name Provider Type    JULISSA Inman, PT Physical Therapist          Recommendation and Plan        PT Assessment/Plan       09/29/17 0800       PT Assessment    Functional Limitations Other (comment)   wound mgmt  -     Impairments Integumentary integrity;Edema  -     Assessment Comments Wound bed continuing to re-epithelialize, but with rolling of edges at tunnel opening requiring roughening with tweezers to promote continued reepithelialization.  Min biofilm present where wound bed not entirely covered with HFB.  Pt continuing to make good progress with current tx.  -     Rehab Potential Good  -     Patient/caregiver participated in establishment of treatment plan and goals Yes  -     Patient would benefit from skilled therapy intervention Yes  -     PT Plan    PT Frequency 2x/week  -     Physical Therapy Interventions (Optional Details) patient/family education;wound care  -     PT Plan Comments MIST, debridement  -       User Key  (r) = Recorded By, (t) = Taken By, (c) = Cosigned By    Initials Name Provider Type    JULISSA Inman, PT Physical Therapist          Goals        PT OP Goals       09/29/17 0800       Time Calculation    PT Goal Re-Cert Due Date 12/17/17  -       User Key  (r) = Recorded By, (t) = Taken By, (c) = Cosigned By    Initials Name Provider Type    JULISSA Inman, PT Physical Therapist          PT Goal Re-Cert Due Date: 12/17/17            Time Calculation: Start Time: 0800    Therapy Charges for Today     Code Description Service Date Service Provider Modifiers Qty    [id number]  PT NLFU MIST 9/29/2017 Candie Inman, PT GP 1    [id number]  AALIYAH DEBRIDE  OPEN WOUND UP TO 20CM 9/29/2017 Candie Inman, PT 60, GP 1                Candie Inman, PT  9/29/2017

## 2017-10-02 ENCOUNTER — HOSPITAL ENCOUNTER (OUTPATIENT)
Dept: PHYSICAL THERAPY | Facility: HOSPITAL | Age: 72
Setting detail: THERAPIES SERIES
Discharge: HOME | End: 2017-10-02

## 2017-10-02 DIAGNOSIS — S81.801D OPEN WOUND OF RIGHT LOWER EXTREMITY, SUBSEQUENT ENCOUNTER: Primary | ICD-10-CM

## 2017-10-02 PROCEDURE — 97610 LOW FREQUENCY NON-THERMAL US: CPT

## 2017-10-02 PROCEDURE — 97597 DBRDMT OPN WND 1ST 20 CM/<: CPT

## 2017-10-02 NOTE — THERAPY WOUND CARE TREATMENT
"    Outpatient Rehabilitation - Wound/Debridement Treatment Note  [facility]     Patient Name: Vlad Arellano  : 1945  MRN: [id number]  Today's Date: 10/2/2017                Admit Date: 10/2/2017    Visit Dx:    ICD-10-CM ICD-9-CM   1. Open wound of right lower extremity, subsequent encounter S81.801D V58.89     891.0       Patient Active Problem List   Diagnosis   • Paroxysmal atrial fibrillation   • Tachy-krishna syndrome   • Benign hypertension   • Lower extremity edema   • PARKER (obstructive sleep apnea)   • Osteoarthritis   • Mild obesity   • MG, ocular (myasthenia gravis)   • Pain   • Hematoma of right thigh   • S/P Debridement irrigation and partial closure of cutaneous tissue right knee        Past Medical History:   Diagnosis Date   • Benign hypertension    • Lower extremity edema    • Mild obesity    • PARKER (obstructive sleep apnea)     requiring CPAP   • Osteoarthritis    • Paroxysmal atrial fibrillation    • Tachy-krishna syndrome         Past Surgical History:   Procedure Laterality Date   • CARDIOVERSION     • INCISION AND DRAINAGE LEG Right 2017    Procedure: INCISION AND DRAINAGE RIGHT KNEE;  Surgeon: Francisco Macias MD;  Location: [facility];  Service:    • NEPHROLITHOTRIPSY PERCUTANEOUS           EVALUATION        PT Ortho       10/02/17 0800    Subjective Comments    Subjective Comments No complaints.  \"I want to try a smaller bandage.\"  -    Subjective Pain    Able to rate subjective pain? yes  -    Pre-Treatment Pain Level 0  -    Post-Treatment Pain Level 0  -    Transfers    Transfer, Comment pt supine on stretcher for tx.  -    Gait Assessment/Treatment    Gait, [place] Level independent  -      User Key  (r) = Recorded By, (t) = Taken By, (c) = Cosigned By    Initials Name Provider Type    JULISSA Inman, PT Physical Therapist                    LDA Wound       10/02/17 0800          Incision 17 1641 Right leg    Incision - Properties Group " "Placement Date: 08/18/17  - Placement Time: 1641 -JV Side: Right  -J Location: leg  -JV    Incision WDL ex  -JM      Dressing Appearance moist drainage;intact  -JM      Appearance drainage;moist;open areas;pink   min residual fibrous slough inferiorly  -JM      Incision Length (cm) 3  -JM      Incision Width (cm) 0.9  -JM      Incision Depth (cm) 0.1  -JM      Tunneling [depth (cm)/Location] 2.3cm@12:00  -      Drainage Characteristics/Odor serosanguineous;no odor   blue drainage from HFB dressing  -JM      Drainage Amount small  -JM      Wound Cleaning cleansed with;other (see comments)   phase one  -JM      Wound Interventions debrided;other (see comments)   MIST 10 min  -JM      Dressing Dressing applied;low-adherent;foam   4\" optifoam gentle  -JM      Packing Incision packed with;other (see comments)   saline-moistened HFB  -JM        User Key  (r) = Recorded By, (t) = Taken By, (c) = Cosigned By    Initials Name Provider Type     Demarcus Harris, RN Registered Nurse    JULISSA Inman, PT Physical Therapist            WOUND DEBRIDEMENT  Debridement Site 1  Location- Site 1: R knee wound  Selective Debridement- Site 1: Wound Surface <20cmsq (1 cmsq)  Instruments- Site 1: tweezers  Excised Tissue Description- Site 1: minimum, slough, other (comment) (hypertrophic crust)  Bleeding- Site 1: none                   Therapy Education       10/02/17 0800          Therapy Education    Education Details Continuation of current POC, decrease to 2x/week  -JM      Given Symptoms/condition management;Bandaging/dressing change  -      Program Reinforced  -      How Provided Verbal  -JM      Provided to Patient  -JULISSA      Level of Understanding Verbalized  -        User Key  (r) = Recorded By, (t) = Taken By, (c) = Cosigned By    Initials Name Provider Type    JULISSA Inman, PT Physical Therapist          Recommendation and Plan        PT Assessment/Plan       10/02/17 0800       PT Assessment    " Functional Limitations Other (comment)   wound mgmt  -     Impairments Integumentary integrity;Edema  -     Assessment Comments Wound with small decrease in tunnel depth.  Width of tunnel decreasing, continued new epithelial growth from wound edges.  Pt will continue to benefit from skilled PT for debridement of residual slough and MIST to stimulate wound healing.  -     Rehab Potential Good  -     Patient/caregiver participated in establishment of treatment plan and goals Yes  -     Patient would benefit from skilled therapy intervention Yes  -     PT Plan    PT Frequency 2x/week  -     Physical Therapy Interventions (Optional Details) patient/family education;wound care  -     PT Plan Comments MIST, debridement  -       User Key  (r) = Recorded By, (t) = Taken By, (c) = Cosigned By    Initials Name Provider Type     Candie Inman, PT Physical Therapist          Goals        PT OP Goals       10/02/17 0800       Time Calculation    PT Goal Re-Cert Due Date 12/17/17  -       User Key  (r) = Recorded By, (t) = Taken By, (c) = Cosigned By    Initials Name Provider Type     Candie Inman, PT Physical Therapist          PT Goal Re-Cert Due Date: 12/17/17            Time Calculation: Start Time: 0800    Therapy Charges for Today     Code Description Service Date Service Provider Modifiers Qty    46175772316 HC AALIYAH DEBRIDE OPEN WOUND UP TO 20CM 10/2/2017 Candie Inman, PT 60, GP 1    [id number] HC PT NLFU MIST 10/2/2017 Candie Inman, PT GP 1                Candie Inman, PT  10/2/2017

## 2017-10-05 ENCOUNTER — HOSPITAL ENCOUNTER (OUTPATIENT)
Dept: PHYSICAL THERAPY | Facility: HOSPITAL | Age: 72
Setting detail: THERAPIES SERIES
Discharge: HOME | End: 2017-10-05

## 2017-10-05 DIAGNOSIS — S81.801D OPEN WOUND OF RIGHT LOWER EXTREMITY, SUBSEQUENT ENCOUNTER: Primary | ICD-10-CM

## 2017-10-05 PROCEDURE — 97597 DBRDMT OPN WND 1ST 20 CM/<: CPT

## 2017-10-05 PROCEDURE — 97610 LOW FREQUENCY NON-THERMAL US: CPT

## 2017-10-05 NOTE — THERAPY WOUND CARE TREATMENT
Outpatient Rehabilitation - Wound/Debridement Treatment Note  [facility]     Patient Name: Vlad Arellano  : 1945  MRN: [id number]  Today's Date: 10/5/2017                Admit Date: 10/5/2017    Visit Dx:    ICD-10-CM ICD-9-CM   1. Open wound of right lower extremity, subsequent encounter S81.801D V58.89     891.0       Patient Active Problem List   Diagnosis   • Paroxysmal atrial fibrillation   • Tachy-krishna syndrome   • Benign hypertension   • Lower extremity edema   • PARKER (obstructive sleep apnea)   • Osteoarthritis   • Mild obesity   • MG, ocular (myasthenia gravis)   • Pain   • Hematoma of right thigh   • S/P Debridement irrigation and partial closure of cutaneous tissue right knee        Past Medical History:   Diagnosis Date   • Benign hypertension    • Lower extremity edema    • Mild obesity    • PARKER (obstructive sleep apnea)     requiring CPAP   • Osteoarthritis    • Paroxysmal atrial fibrillation    • Tachy-krishna syndrome         Past Surgical History:   Procedure Laterality Date   • CARDIOVERSION     • INCISION AND DRAINAGE LEG Right 2017    Procedure: INCISION AND DRAINAGE RIGHT KNEE;  Surgeon: Francisco Macias MD;  Location: [facility];  Service:    • NEPHROLITHOTRIPSY PERCUTANEOUS           EVALUATION        PT Ortho       10/05/17 0800    Subjective Comments    Subjective Comments No complaints, smaller optifoam stayed on without any issues.  -    Subjective Pain    Able to rate subjective pain? yes  -    Pre-Treatment Pain Level 0  -    Post-Treatment Pain Level 0  -JM    Transfers    Transfer, Comment pt supine on stretcher for tx  -    Gait Assessment/Treatment    Gait, [place] Level independent  -      User Key  (r) = Recorded By, (t) = Taken By, (c) = Cosigned By    Initials Name Provider Type    JULISSA Inman, PT Physical Therapist                    LDA Wound       10/05/17 0800          Incision 17 1641 Right leg    Incision - Properties  "Group Placement Date: 08/18/17  - Placement Time: 1641 -JV Side: Right  - Location: leg  -JV    Incision WDL ex  -      Dressing Appearance moist drainage;intact   HFB slighlty adherent to distal wound bed  -      Appearance drainage;moist;open areas;pink   min residual fibrous slough inferiorly  -      Drainage Characteristics/Odor serosanguineous;no odor   blue drainage from HFB dressing  -      Drainage Amount small  -      Wound Cleaning cleansed with;other (see comments)   phase one  -      Wound Interventions debrided;other (see comments)   MIST 8 min  -      Dressing Dressing applied;low-adherent;foam   4\" optifoam gentle  -      Packing other (see comments)   saline-moistened HFB  -        User Key  (r) = Recorded By, (t) = Taken By, (c) = Cosigned By    Initials Name Provider Type     Demarcus Harris, RN Registered Nurse    JULISSA Inman, PT Physical Therapist            WOUND DEBRIDEMENT  Debridement Site 1  Location- Site 1: R knee wound  Selective Debridement- Site 1: Wound Surface <20cmsq  Instruments- Site 1: tweezers  Excised Tissue Description- Site 1: minimum, slough  Bleeding- Site 1: none                   Therapy Education       10/05/17 0800          Therapy Education    Given Symptoms/condition management;Bandaging/dressing change  -      Program Reinforced  -      How Provided Verbal  -      Provided to Patient  -      Level of Understanding Verbalized  -        User Key  (r) = Recorded By, (t) = Taken By, (c) = Cosigned By    Initials Name Provider Type    JULISSA Inman, PT Physical Therapist          Recommendation and Plan        PT Assessment/Plan       10/05/17 0800       PT Assessment    Functional Limitations Other (comment)   wound mgmt  -     Impairments Integumentary integrity;Edema  -JM     Assessment Comments Central wound bed nearly bridged by new skin, distal wound with less residual fibrous slough today.  Pt making good progress, " will continue with current tx.  -     Rehab Potential Good  -     Patient/caregiver participated in establishment of treatment plan and goals Yes  -     Patient would benefit from skilled therapy intervention Yes  -     PT Plan    PT Frequency 2x/week  -     Physical Therapy Interventions (Optional Details) patient/family education;wound care  -     PT Plan Comments MIST, debridement  -       User Key  (r) = Recorded By, (t) = Taken By, (c) = Cosigned By    Initials Name Provider Type    JULISSA Inman, PT Physical Therapist          Goals        PT OP Goals       10/05/17 0800       Time Calculation    PT Goal Re-Cert Due Date 12/17/17  -       User Key  (r) = Recorded By, (t) = Taken By, (c) = Cosigned By    Initials Name Provider Type    JULISSA Inman, PT Physical Therapist          PT Goal Re-Cert Due Date: 12/17/17            Time Calculation: Start Time: 0800    Therapy Charges for Today     Code Description Service Date Service Provider Modifiers Qty    95142253765 HC PT NLFU MIST 10/5/2017 Candie Inman, PT GP 1    [id number] HC AALIYAH DEBRIDE OPEN WOUND UP TO 20CM 10/5/2017 Candie Inman, PT 60, GP 1                Candie Inman, PT  10/5/2017

## 2017-10-09 ENCOUNTER — HOSPITAL ENCOUNTER (OUTPATIENT)
Dept: PHYSICAL THERAPY | Facility: HOSPITAL | Age: 72
Setting detail: THERAPIES SERIES
Discharge: HOME | End: 2017-10-09

## 2017-10-09 DIAGNOSIS — S81.801D OPEN WOUND OF RIGHT LOWER EXTREMITY, SUBSEQUENT ENCOUNTER: Primary | ICD-10-CM

## 2017-10-09 PROCEDURE — 97610 LOW FREQUENCY NON-THERMAL US: CPT

## 2017-10-09 PROCEDURE — 97597 DBRDMT OPN WND 1ST 20 CM/<: CPT

## 2017-10-09 NOTE — THERAPY WOUND CARE TREATMENT
Outpatient Rehabilitation - Wound/Debridement Treatment Note  [facility]     Patient Name: Vlad Arellano  : 1945  MRN: [id number]  Today's Date: 10/9/2017                Admit Date: 10/9/2017    Visit Dx:    ICD-10-CM ICD-9-CM   1. Open wound of right lower extremity, subsequent encounter S81.801D V58.89     891.0       Patient Active Problem List   Diagnosis   • Paroxysmal atrial fibrillation   • Tachy-krishna syndrome   • Benign hypertension   • Lower extremity edema   • PARKER (obstructive sleep apnea)   • Osteoarthritis   • Mild obesity   • MG, ocular (myasthenia gravis)   • Pain   • Hematoma of right thigh   • S/P Debridement irrigation and partial closure of cutaneous tissue right knee        Past Medical History:   Diagnosis Date   • Benign hypertension    • Lower extremity edema    • Mild obesity    • PARKER (obstructive sleep apnea)     requiring CPAP   • Osteoarthritis    • Paroxysmal atrial fibrillation    • Tachy-krishna syndrome         Past Surgical History:   Procedure Laterality Date   • CARDIOVERSION     • INCISION AND DRAINAGE LEG Right 2017    Procedure: INCISION AND DRAINAGE RIGHT KNEE;  Surgeon: Francisco Macias MD;  Location: [facility];  Service:    • NEPHROLITHOTRIPSY PERCUTANEOUS           EVALUATION        PT Ortho       10/09/17 1035    Subjective Comments    Subjective Comments No complaints or changes. Pt reports the surgeon is very pleased with his progress.  -    Subjective Pain    Able to rate subjective pain? yes  -    Pre-Treatment Pain Level 0  -    Post-Treatment Pain Level 0  -MC    Transfers    Transfer, Comment Supine on stretcher for tx  -    Gait Assessment/Treatment    Gait, [place] Level independent  -      User Key  (r) = Recorded By, (t) = Taken By, (c) = Cosigned By    Initials Name Provider Type    SHAHRIAR Grubbs, PT Physical Therapist                    LDA Wound       10/09/17 1035          Incision 17 1641 Right leg     "Incision - Properties Group Placement Date: 08/18/17  -JV Placement Time: 1641 -JV Side: Right  -JV Location: leg  -JV    Incision WDL ex  -MC      Dressing Appearance moist drainage;intact   temporary dressing from MD office  -      Appearance drainage;moist;open areas;pink   min residual fibrous slough inferiorly, epithelialization  -      Incision Length (cm) 0.6   superior open area. Inferior: 1.1 x 0.5 x 0.1  -MC      Incision Width (cm) 0.3  -MC      Incision Depth (cm) 0.1  -MC      Tunneling [depth (cm)/Location] 2.1 cm @ 12:00  -      Drainage Characteristics/Odor serosanguineous;no odor   blue drainage from HFB dressing  -      Drainage Amount small  -      Wound Cleaning cleansed with;other (see comments)   phase one  -      Wound Interventions debrided;other (see comments)   MIST 8 minutes  -      Dressing Dressing applied;low-adherent;foam   4\" optifoam gentle  -      Packing other (see comments)   saline-moist HFB, thin strip  -        User Key  (r) = Recorded By, (t) = Taken By, (c) = Cosigned By    Initials Name Provider Type    MARY ANN Harris RN Registered Nurse    SHAHRIAR Grubbs, PT Physical Therapist            WOUND DEBRIDEMENT  Debridement Site 1  Location- Site 1: R knee wound  Selective Debridement- Site 1: Wound Surface <20cmsq  Instruments- Site 1: tweezers  Excised Tissue Description- Site 1: scant, slough  Bleeding- Site 1: seeping, held pressure, 1 minute                   Therapy Education       10/09/17 1035          Therapy Education    Education Details Continue with current POC.  -MC      Given Symptoms/condition management;Bandaging/dressing change  -      Program Reinforced  -MC      How Provided Verbal  -MC      Provided to Patient  -      Level of Understanding Verbalized  -MC        User Key  (r) = Recorded By, (t) = Taken By, (c) = Cosigned By    Initials Name Provider Type    SHAHRIAR Grubbs, PT Physical Therapist          Recommendation " and Plan        PT Assessment/Plan       10/09/17 1035       PT Assessment    Functional Limitations Other (comment)   wound mgmt  -     Impairments Integumentary integrity;Edema  -     Assessment Comments Skin bridge intact centrally, creating two separate wound beds. Pt with some adherent fibrous tissue to the inferior wound bed. Tunneling at 12:00 decreased slightly, from 2.3 cm to 2.1 cm, and the opening is narrowing. Pt is progressing very well with the current POC and will continue to benefit from skilled PT wound care to continue progress.  -     Rehab Potential Good  -     Patient/caregiver participated in establishment of treatment plan and goals Yes  -     Patient would benefit from skilled therapy intervention Yes  -MC     PT Plan    PT Frequency 2x/week  -     Physical Therapy Interventions (Optional Details) patient/family education;wound care  -     PT Plan Comments MIST, debridement  -       User Key  (r) = Recorded By, (t) = Taken By, (c) = Cosigned By    Initials Name Provider Type     Dodie Grubbs, PT Physical Therapist          Goals        PT OP Goals       10/09/17 1035       Time Calculation    PT Goal Re-Cert Due Date 12/17/17  -       User Key  (r) = Recorded By, (t) = Taken By, (c) = Cosigned By    Initials Name Provider Type     Dodie Grubbs, PT Physical Therapist          PT Goal Re-Cert Due Date: 12/17/17            Time Calculation: Start Time: 1035    Therapy Charges for Today     Code Description Service Date Service Provider Modifiers Qty    [id number]  PT NLFU MIST 10/9/2017 Dodie Grubbs, PT 60, GP 1    [id number]  AALIYAH DEBRIDE OPEN WOUND UP TO 20CM 10/9/2017 Dodie Grubbs, PT GP 1                Dodie Grubbs, PT  10/9/2017

## 2017-10-12 ENCOUNTER — HOSPITAL ENCOUNTER (OUTPATIENT)
Dept: PHYSICAL THERAPY | Facility: HOSPITAL | Age: 72
Setting detail: THERAPIES SERIES
Discharge: HOME | End: 2017-10-12

## 2017-10-12 DIAGNOSIS — S81.801D OPEN WOUND OF RIGHT LOWER EXTREMITY, SUBSEQUENT ENCOUNTER: Primary | ICD-10-CM

## 2017-10-12 PROCEDURE — 97610 LOW FREQUENCY NON-THERMAL US: CPT

## 2017-10-12 PROCEDURE — 97597 DBRDMT OPN WND 1ST 20 CM/<: CPT

## 2017-10-12 NOTE — THERAPY WOUND CARE TREATMENT
Outpatient Rehabilitation - Wound/Debridement Treatment Note  [facility]     Patient Name: Vlad Arellano  : 1945  MRN: [id number]  Today's Date: 10/12/2017                Admit Date: 10/12/2017    Visit Dx:    ICD-10-CM ICD-9-CM   1. Open wound of right lower extremity, subsequent encounter S81.801D V58.89     891.0       Patient Active Problem List   Diagnosis   • Paroxysmal atrial fibrillation   • Tachy-krishna syndrome   • Benign hypertension   • Lower extremity edema   • PARKER (obstructive sleep apnea)   • Osteoarthritis   • Mild obesity   • MG, ocular (myasthenia gravis)   • Pain   • Hematoma of right thigh   • S/P Debridement irrigation and partial closure of cutaneous tissue right knee        Past Medical History:   Diagnosis Date   • Benign hypertension    • Lower extremity edema    • Mild obesity    • PARKER (obstructive sleep apnea)     requiring CPAP   • Osteoarthritis    • Paroxysmal atrial fibrillation    • Tachy-krishna syndrome         Past Surgical History:   Procedure Laterality Date   • CARDIOVERSION     • INCISION AND DRAINAGE LEG Right 2017    Procedure: INCISION AND DRAINAGE RIGHT KNEE;  Surgeon: Francisco Macias MD;  Location: [facility];  Service:    • NEPHROLITHOTRIPSY PERCUTANEOUS           EVALUATION        PT Ortho       10/12/17 0800    Subjective Comments    Subjective Comments Pt without complaints.  -    Subjective Pain    Able to rate subjective pain? yes  -    Pre-Treatment Pain Level 0  -    Post-Treatment Pain Level 0  -    Transfers    Transfer, Comment supine on stretcher for tx  -    Gait Assessment/Treatment    Gait, [place] Level independent  -      10/09/17 1035    Subjective Comments    Subjective Comments No complaints or changes. Pt reports the surgeon is very pleased with his progress.  -    Subjective Pain    Able to rate subjective pain? yes  -    Pre-Treatment Pain Level 0  -    Post-Treatment Pain Level 0  -MC    Transfers     "Transfer, Comment Supine on stretcher for tx  -    Gait Assessment/Treatment    Gait, [place] Level independent  -      User Key  (r) = Recorded By, (t) = Taken By, (c) = Cosigned By    Initials Name Provider Type     Dodie Grubbs, PT Physical Therapist    JULISSA Inman, PT Physical Therapist                    LDA Wound       10/12/17 0800          Incision 08/18/17 1641 Right leg    Incision - Properties Group Placement Date: 08/18/17  - Placement Time: 1641 -JV Side: Right  -JV Location: leg  -JV    Incision WDL ex  -JM      Dressing Appearance moist drainage;intact  -JM      Appearance drainage;moist;open areas;pink   skin rolling into tunnel superiorly  -      Drainage Characteristics/Odor serosanguineous;no odor  -      Drainage Amount small  -      Wound Cleaning cleansed with;other (see comments)   phase one  -      Wound Interventions debrided;other (see comments)   MIST 8 min  -      Dressing Dressing applied;low-adherent;foam   4\" optifoam  -      Packing other (see comments)   saline-moistened HFB  -        User Key  (r) = Recorded By, (t) = Taken By, (c) = Cosigned By    Initials Name Provider Type     Demarcus Harris, RN Registered Nurse    JULISSA Inman, PT Physical Therapist            WOUND DEBRIDEMENT  Debridement Site 1  Location- Site 1: R knee wound  Selective Debridement- Site 1: Wound Surface <20cmsq  Instruments- Site 1: tweezers  Excised Tissue Description- Site 1: minimum, slough, other (comment) (roughened rolling edges with tweezers)  Bleeding- Site 1: seeping, held pressure, 1 minute                   Therapy Education       10/12/17 0800          Therapy Education    Given Symptoms/condition management;Bandaging/dressing change  -      Program Reinforced  -      How Provided Verbal  -JM      Provided to Patient  -      Level of Understanding Verbalized  -        User Key  (r) = Recorded By, (t) = Taken By, (c) = Cosigned By    " Initials Name Provider Type     Candie Inman, PT Physical Therapist          Recommendation and Plan        PT Assessment/Plan       10/12/17 0800       PT Assessment    Functional Limitations Other (comment)   wound mgmt  -     Impairments Integumentary integrity;Edema  -     Assessment Comments Central area re-epithelialized, inferior aspect clean and red post-debridement today and expect closure of inf aspect in next couple days.  Edges at tunnel rolling and required roughening with tweezers today.  Will continue with current 2x/week tx.  -     Rehab Potential Good  -     Patient/caregiver participated in establishment of treatment plan and goals Yes  -     Patient would benefit from skilled therapy intervention Yes  -     PT Plan    PT Frequency 2x/week  -     Physical Therapy Interventions (Optional Details) patient/family education;wound care  -     PT Plan Comments PN due next tx  -       User Key  (r) = Recorded By, (t) = Taken By, (c) = Cosigned By    Initials Name Provider Type     Candie Inman, PT Physical Therapist          Goals        PT OP Goals       10/12/17 0800       Time Calculation    PT Goal Re-Cert Due Date 12/17/17  -       User Key  (r) = Recorded By, (t) = Taken By, (c) = Cosigned By    Initials Name Provider Type     Candie Inman, PT Physical Therapist          PT Goal Re-Cert Due Date: 12/17/17            Time Calculation: Start Time: 0800    Therapy Charges for Today     Code Description Service Date Service Provider Modifiers Qty    87597569339 HC PT NLFU MIST 10/12/2017 Candie Inman, PT GP 1    21467003016 HC AALIYAH DEBRIDE OPEN WOUND UP TO 20CM 10/12/2017 Candie Inman, PT 60, GP 1                Candie Inman, PT  10/12/2017

## 2017-10-16 ENCOUNTER — HOSPITAL ENCOUNTER (OUTPATIENT)
Dept: PHYSICAL THERAPY | Facility: HOSPITAL | Age: 72
Setting detail: THERAPIES SERIES
Discharge: HOME | End: 2017-10-16

## 2017-10-16 DIAGNOSIS — S81.801D OPEN WOUND OF RIGHT LOWER EXTREMITY, SUBSEQUENT ENCOUNTER: Primary | ICD-10-CM

## 2017-10-16 PROCEDURE — 97597 DBRDMT OPN WND 1ST 20 CM/<: CPT

## 2017-10-16 PROCEDURE — 97610 LOW FREQUENCY NON-THERMAL US: CPT

## 2017-10-16 PROCEDURE — G8990 OTHER PT/OT CURRENT STATUS: HCPCS

## 2017-10-16 PROCEDURE — G8991 OTHER PT/OT GOAL STATUS: HCPCS

## 2017-10-16 NOTE — THERAPY PROGRESS REPORT/RE-CERT
"    Outpatient Rehabilitation - Wound/Debridement Progress Note  [facility]     Patient Name: Vlad Arellano  : 1945  MRN: [id number]  Today's Date: 10/16/2017                Admit Date: 10/16/2017    Visit Dx:    ICD-10-CM ICD-9-CM   1. Open wound of right lower extremity, subsequent encounter S81.801D V58.89     891.0       Patient Active Problem List   Diagnosis   • Paroxysmal atrial fibrillation   • Tachy-krishna syndrome   • Benign hypertension   • Lower extremity edema   • PARKER (obstructive sleep apnea)   • Osteoarthritis   • Mild obesity   • MG, ocular (myasthenia gravis)   • Pain   • Hematoma of right thigh   • S/P Debridement irrigation and partial closure of cutaneous tissue right knee        Past Medical History:   Diagnosis Date   • Benign hypertension    • Lower extremity edema    • Mild obesity    • PARKER (obstructive sleep apnea)     requiring CPAP   • Osteoarthritis    • Paroxysmal atrial fibrillation    • Tachy-krishna syndrome         Past Surgical History:   Procedure Laterality Date   • CARDIOVERSION     • INCISION AND DRAINAGE LEG Right 2017    Procedure: INCISION AND DRAINAGE RIGHT KNEE;  Surgeon: Francisco Macias MD;  Location: [facility];  Service:    • NEPHROLITHOTRIPSY PERCUTANEOUS           EVALUATION        PT Ortho       10/16/17 0800    Subjective Comments    Subjective Comments No complaints from pt today.  \"I didn't think it would look this good.\"  -    Subjective Pain    Able to rate subjective pain? yes  -    Pre-Treatment Pain Level 0  -    Post-Treatment Pain Level 0  -    Transfers    Transfer, Comment supine on stretcher for tx.  -    Gait Assessment/Treatment    Gait, [place] Level independent  -      User Key  (r) = Recorded By, (t) = Taken By, (c) = Cosigned By    Initials Name Provider Type    JULISSA Inman, PT Physical Therapist                    LDA Wound       10/16/17 0800          Incision 17 1641 Right leg    Incision - " "Properties Group Placement Date: 08/18/17  -J Placement Time: 1641 -JV Side: Right  -J Location: leg  -JV    Incision WDL ex  -JM      Dressing Appearance moist drainage;intact   HFB mildly adherent to re-epithelialized area  -JM      Appearance drainage;moist;open areas;pink   skin rolling into tunnel superiorly  -JM      Incision Length (cm) 0.2   superior; inferior 0.3cm x 0.4cm x 0.1cm  -JM      Incision Width (cm) 0.3  -JM      Incision Depth (cm) 0.1  -JM      Tunneling [depth (cm)/Location] 1.8cm@12:00  -      Drainage Characteristics/Odor serosanguineous;no odor  -      Drainage Amount small  -JM      Wound Cleaning cleansed with;other (see comments)   phase one  -      Wound Interventions debrided;other (see comments)   MIST 8 min  -      Dressing Dressing applied;low-adherent;foam   4\" optifoam  -JM      Packing other (see comments)   saline-moistened HFB  -        User Key  (r) = Recorded By, (t) = Taken By, (c) = Cosigned By    Initials Name Provider Type     Demarcus Harris, RN Registered Nurse    JULISSA Inman, PT Physical Therapist            WOUND DEBRIDEMENT  Debridement Site 1  Location- Site 1: R knee wound  Selective Debridement- Site 1: Wound Surface <20cmsq  Instruments- Site 1: tweezers  Excised Tissue Description- Site 1: minimum, slough, other (comment) (hypertrophic crust; also roughened rolling edges at tunnel)  Bleeding- Site 1: scant                   Therapy Education       10/16/17 0800          Therapy Education    Education Details Continue with current POC to promote continued wound healing.  -JM      Given Symptoms/condition management;Bandaging/dressing change  -      Program Reinforced  -JULISSA      How Provided Verbal  -JM      Provided to Patient  -JM      Level of Understanding Verbalized  -        User Key  (r) = Recorded By, (t) = Taken By, (c) = Cosigned By    Initials Name Provider Type    JULISSA Inman, PT Physical Therapist    "       Recommendation and Plan        PT Assessment/Plan       10/16/17 0800       PT Assessment    Functional Limitations Other (comment)   wound mgmt  -     Impairments Integumentary integrity;Edema  -     Assessment Comments Central wound area re-epithelialized.  Inferior aspect with small 0.3cm x 0.4cm area remaining.  Superior tunnel decreased to 1.8cm depth.  Pt making good progress, will continue to benefit from MIST and selective debridement to promote continued wound closure.  -     Rehab Potential Good  -     Patient/caregiver participated in establishment of treatment plan and goals Yes  -     Patient would benefit from skilled therapy intervention Yes  -     PT Plan    PT Frequency 2x/week  -     Predicted Duration of Therapy Intervention (days/wks) 8 visits, 4 weeks  -     Planned CPT's? PT NLFU MIST: 38727;PT AALIYAH DEBRIDE OPEN WOUND UP TO 20 CM: 71490;PT NONSELECT DEBRIDE 15 MIN: 49799;PT THER PROC EA 15 MIN: 78363  -     Physical Therapy Interventions (Optional Details) patient/family education;wound care  -     PT Plan Comments MIST, debridement  -       User Key  (r) = Recorded By, (t) = Taken By, (c) = Cosigned By    Initials Name Provider Type    JULISSA Inman, PT Physical Therapist          Goals        PT OP Goals       10/16/17 0800       PT Short Term Goals    STG 1 Patient and/ or caregiver able to verbalize signs and symptoms of infection.  -     STG 1 Progress Met  -     STG 2 Decrease wound dimensions by 25% as evidence of wound closure.  -     STG 2 Progress Met  -     STG 3 Pt will demonstrate 50% reduction in induration of periwound to indicate progress.  -     STG 3 Progress Met  -     STG 4 Pt will demonstrate reduction in tunneled area by 2 cm to indicate progress.  -     STG 4 Progress Met  -     Long Term Goals    LTG 1 Patient and/ or caregiver independent with clean dressing changes.  -     LTG 1 Progress Partially Met  -     LTG 2  Decrease wound dimensions by 75% as evidence of wound closure.  -     LTG 2 Progress Ongoing  -     LTG 3 Pt will demonstrate 75% reduction in periwound induration to indicate progress.  -     LTG 3 Progress Met  -     LTG 4 Pt will demonstrate reduction in tunneled area by 6 cm to indicate healing progress.  -     LTG 4 Progress Met  -     Time Calculation    PT Goal Re-Cert Due Date 12/17/17  -       User Key  (r) = Recorded By, (t) = Taken By, (c) = Cosigned By    Initials Name Provider Type    JULISSA Inman, PT Physical Therapist              Time Calculation: Start Time: 0800    Therapy Charges for Today     Code Description Service Date Service Provider Modifiers Qty    00184996178 HC PT OTHER PRIME FUNCT CURRENT 10/16/2017 Candie Inman, PT GP,  1    43371750107 HC PT OTHER PRIME FUNCT PROJECTED 10/16/2017 Candie Inman, PT GP,  1    88765807468 HC PT NLFU MIST 10/16/2017 Candie Inman, PT GP 1    08251141105 HC AALIYAH DEBRIDE OPEN WOUND UP TO 20CM 10/16/2017 Candie Inman, PT 60, GP 1          PT G-Codes  PT Professional Judgement Used?: Yes  Outcome Measure Options: BWAT ([place]-Nicole Wound Assess Tool)  Score: BWAT 28; 25% impairment in wound management  Functional Limitation: Other PT primary  Other PT Primary Current Status (): At least 20 percent but less than 40 percent impaired, limited or restricted  Other PT Primary Goal Status (): 0 percent impaired, limited or restricted     Candie Inman, PT  10/16/2017

## 2017-10-19 ENCOUNTER — HOSPITAL ENCOUNTER (OUTPATIENT)
Dept: PHYSICAL THERAPY | Facility: HOSPITAL | Age: 72
Setting detail: THERAPIES SERIES
Discharge: HOME | End: 2017-10-19

## 2017-10-19 DIAGNOSIS — S81.801D OPEN WOUND OF RIGHT LOWER EXTREMITY, SUBSEQUENT ENCOUNTER: Primary | ICD-10-CM

## 2017-10-19 PROCEDURE — 97597 DBRDMT OPN WND 1ST 20 CM/<: CPT

## 2017-10-19 PROCEDURE — 97610 LOW FREQUENCY NON-THERMAL US: CPT

## 2017-10-19 NOTE — THERAPY WOUND CARE TREATMENT
Outpatient Rehabilitation - Wound/Debridement Treatment Note  [facility]     Patient Name: Vlad Arellano  : 1945  MRN: [id number]  Today's Date: 10/19/2017                Admit Date: 10/19/2017    Visit Dx:    ICD-10-CM ICD-9-CM   1. Open wound of right lower extremity, subsequent encounter S81.801D V58.89     891.0       Patient Active Problem List   Diagnosis   • Paroxysmal atrial fibrillation   • Tachy-krishna syndrome   • Benign hypertension   • Lower extremity edema   • PARKER (obstructive sleep apnea)   • Osteoarthritis   • Mild obesity   • MG, ocular (myasthenia gravis)   • Pain   • Hematoma of right thigh   • S/P Debridement irrigation and partial closure of cutaneous tissue right knee        Past Medical History:   Diagnosis Date   • Benign hypertension    • Lower extremity edema    • Mild obesity    • PARKER (obstructive sleep apnea)     requiring CPAP   • Osteoarthritis    • Paroxysmal atrial fibrillation    • Tachy-krishna syndrome         Past Surgical History:   Procedure Laterality Date   • CARDIOVERSION     • INCISION AND DRAINAGE LEG Right 2017    Procedure: INCISION AND DRAINAGE RIGHT KNEE;  Surgeon: Francisco Macias MD;  Location: [facility];  Service:    • NEPHROLITHOTRIPSY PERCUTANEOUS           EVALUATION        PT Ortho       10/19/17 0800    Subjective Comments    Subjective Comments No complaints today.  -    Subjective Pain    Able to rate subjective pain? yes  -    Pre-Treatment Pain Level 0  -    Post-Treatment Pain Level 0  -    Transfers    Transfer, Comment supine on stretcher for tx  -    Gait Assessment/Treatment    Gait, [place] Level independent  -      User Key  (r) = Recorded By, (t) = Taken By, (c) = Cosigned By    Initials Name Provider Type    JULISSA Inman, PT Physical Therapist                    LDA Wound       10/19/17 0800          Incision 17 1641 Right leg    Incision - Properties Group Placement Date: 17  -MARY ANN  "Placement Time: 1641 -JV Side: Right  -JV Location: leg  -JV    Incision WDL ex  -      Dressing Appearance moist drainage;intact   HFB mildly adherent to re-epithelialized area  -      Appearance drainage;moist;open areas;pink   skin rolling into tunnel superiorly  -      Drainage Characteristics/Odor serosanguineous;no odor  -      Drainage Amount small  -      Wound Cleaning cleansed with;other (see comments)   phase one  -      Wound Interventions debrided;other (see comments)   MIST 8 min, roughened rolling edges with tweezers  -      Dressing Dressing applied;low-adherent;foam   4\" optifoam  -      Packing other (see comments)   cutimed sorbact cut into ribbon  -        User Key  (r) = Recorded By, (t) = Taken By, (c) = Cosigned By    Initials Name Provider Type     Demarcus Harris, RN Registered Nurse    JULISSA Inman, PT Physical Therapist            WOUND DEBRIDEMENT  Debridement Site 1  Location- Site 1: R knee wound  Selective Debridement- Site 1: Wound Surface <20cmsq  Instruments- Site 1: tweezers  Excised Tissue Description- Site 1: scant, slough, other (comment) (hypertrophic crust)  Bleeding- Site 1: scant                   Therapy Education       10/19/17 0800          Therapy Education    Given Symptoms/condition management;Bandaging/dressing change  -      Program Reinforced  -      How Provided Verbal  -      Provided to Patient  -      Level of Understanding Verbalized  -        User Key  (r) = Recorded By, (t) = Taken By, (c) = Cosigned By    Initials Name Provider Type    JULISSA Inman, PT Physical Therapist          Recommendation and Plan        PT Assessment/Plan       10/19/17 0800       PT Assessment    Functional Limitations Other (comment)   wound mgmt  -     Impairments Integumentary integrity;Edema  -     Assessment Comments Slight blistering of re-epithelialized central area today, likely from HFB drying out and causing friction on " wound surface.  PT changed packing to cutimed sorbact ribbon today.  Also roughened rolling edges at superior tunnel to promote tunnel closure.Will continue with MIST and debridement to promote wound closure.  -     Rehab Potential Good  -     Patient/caregiver participated in establishment of treatment plan and goals Yes  -     Patient would benefit from skilled therapy intervention Yes  -     PT Plan    PT Frequency 2x/week  -     Physical Therapy Interventions (Optional Details) patient/family education;wound care  -     PT Plan Comments MIST, debridement  -       User Key  (r) = Recorded By, (t) = Taken By, (c) = Cosigned By    Initials Name Provider Type    JULISSA Inman, PT Physical Therapist          Goals        PT OP Goals       10/19/17 0800       Time Calculation    PT Goal Re-Cert Due Date 12/17/17  -       User Key  (r) = Recorded By, (t) = Taken By, (c) = Cosigned By    Initials Name Provider Type    JULISSA Inman, PT Physical Therapist          PT Goal Re-Cert Due Date: 12/17/17            Time Calculation: Start Time: 0800    Therapy Charges for Today     Code Description Service Date Service Provider Modifiers Qty    23066347359 HC PT NLFU MIST 10/19/2017 Candie Inman, PT GP 1    [id number] HC AALIYAH DEBRIDE OPEN WOUND UP TO 20CM 10/19/2017 Candie Inman, PT 60, GP 1                Candie Inman, PT  10/19/2017

## 2017-10-23 ENCOUNTER — HOSPITAL ENCOUNTER (OUTPATIENT)
Dept: PHYSICAL THERAPY | Facility: HOSPITAL | Age: 72
Setting detail: THERAPIES SERIES
Discharge: HOME | End: 2017-10-23

## 2017-10-23 DIAGNOSIS — S81.801D OPEN WOUND OF RIGHT LOWER EXTREMITY, SUBSEQUENT ENCOUNTER: Primary | ICD-10-CM

## 2017-10-23 PROCEDURE — 97597 DBRDMT OPN WND 1ST 20 CM/<: CPT

## 2017-10-23 PROCEDURE — 97610 LOW FREQUENCY NON-THERMAL US: CPT

## 2017-10-23 NOTE — THERAPY WOUND CARE TREATMENT
Outpatient Rehabilitation - Wound/Debridement Treatment Note  [facility]     Patient Name: Vlad Arellano  : 1945  MRN: [id number]  Today's Date: 10/23/2017                Admit Date: 10/23/2017    Visit Dx:    ICD-10-CM ICD-9-CM   1. Open wound of right lower extremity, subsequent encounter S81.801D V58.89     891.0       Patient Active Problem List   Diagnosis   • Paroxysmal atrial fibrillation   • Tachy-krishna syndrome   • Benign hypertension   • Lower extremity edema   • PARKER (obstructive sleep apnea)   • Osteoarthritis   • Mild obesity   • MG, ocular (myasthenia gravis)   • Pain   • Hematoma of right thigh   • S/P Debridement irrigation and partial closure of cutaneous tissue right knee        Past Medical History:   Diagnosis Date   • Benign hypertension    • Lower extremity edema    • Mild obesity    • PARKER (obstructive sleep apnea)     requiring CPAP   • Osteoarthritis    • Paroxysmal atrial fibrillation    • Tachy-krishna syndrome         Past Surgical History:   Procedure Laterality Date   • CARDIOVERSION     • INCISION AND DRAINAGE LEG Right 2017    Procedure: INCISION AND DRAINAGE RIGHT KNEE;  Surgeon: Francisco Macias MD;  Location: [facility];  Service:    • NEPHROLITHOTRIPSY PERCUTANEOUS           EVALUATION        PT Ortho       10/23/17 0800    Subjective Comments    Subjective Comments No complaints today.  -    Subjective Pain    Able to rate subjective pain? yes  -    Pre-Treatment Pain Level 0  -    Post-Treatment Pain Level 0  -    Transfers    Transfer, Comment supine on stretcher for tx  -    Gait Assessment/Treatment    Gait, [place] Level independent  -      User Key  (r) = Recorded By, (t) = Taken By, (c) = Cosigned By    Initials Name Provider Type    JULISSA Inman, PT Physical Therapist                    LDA Wound       10/23/17 0800          Incision 17 1641 Right leg    Incision - Properties Group Placement Date: 17  -MARY ANN  "Placement Time: 1641 -JV Side: Right  -JV Location: leg  -JV    Incision WDL ex  -JM      Dressing Appearance moist drainage;intact  -JM      Appearance drainage;moist;open areas;pink   skin rolling into tunnel superiorly  -JM      Drainage Characteristics/Odor serosanguineous;no odor  -JM      Drainage Amount small  -JM      Wound Cleaning cleansed with;other (see comments)   phase one  -JM      Wound Interventions debrided;other (see comments)   MIST 8 min  -JM      Dressing Dressing applied;antimicrobial textile;low-adherent;foam   HFB ready, 4\" optifoam gentle  -JM      Packing other (see comments)   cutimed sorbact ribbon to superior tunnel  -JM        User Key  (r) = Recorded By, (t) = Taken By, (c) = Cosigned By    Initials Name Provider Type    MARY ANN Harris RN Registered Nurse    JULISSA Inman, PT Physical Therapist            WOUND DEBRIDEMENT  Debridement Site 1  Location- Site 1: R knee wound  Selective Debridement- Site 1: Wound Surface <20cmsq  Instruments- Site 1: tweezers  Excised Tissue Description- Site 1: minimum, slough, other (comment) (hypertrophic crust, roughened rolling edges)  Bleeding- Site 1: none                   Therapy Education       10/23/17 0800          Therapy Education    Given Symptoms/condition management;Bandaging/dressing change  -      Program Reinforced  -JM      How Provided Verbal  -JM      Provided to Patient  -JM      Level of Understanding Verbalized  -JM        User Key  (r) = Recorded By, (t) = Taken By, (c) = Cosigned By    Initials Name Provider Type    JULISSA Inman, PT Physical Therapist          Recommendation and Plan        PT Assessment/Plan       10/23/17 0800       PT Assessment    Functional Limitations Other (comment)   wound mgmt  -JM     Impairments Integumentary integrity;Edema  -JM     Assessment Comments Tunnel at 12:00 narrowing, still with rollling of edges that PT roughened with tweezers to prevent premature closure of " tunnel opening.  Re-epithelialized central area moist and red today, added HFB ready for moisture management.  Also min slough buildup distally today requiring debridement.  Pt continuing to make good progress.  -     Rehab Potential Good  -     Patient/caregiver participated in establishment of treatment plan and goals Yes  -     Patient would benefit from skilled therapy intervention Yes  -     PT Plan    PT Frequency 2x/week  -     Physical Therapy Interventions (Optional Details) patient/family education;wound care  -     PT Plan Comments MIST, debridement, dressing management  -       User Key  (r) = Recorded By, (t) = Taken By, (c) = Cosigned By    Initials Name Provider Type    JULISSA Inman, PT Physical Therapist          Goals        PT OP Goals       10/23/17 0800       Time Calculation    PT Goal Re-Cert Due Date 12/17/17  -       User Key  (r) = Recorded By, (t) = Taken By, (c) = Cosigned By    Initials Name Provider Type    JULISSA Inman, PT Physical Therapist          PT Goal Re-Cert Due Date: 12/17/17            Time Calculation: Start Time: 0800    Therapy Charges for Today     Code Description Service Date Service Provider Modifiers Qty    16496663669 HC PT NLFU MIST 10/23/2017 Candie Inman, PT 60, GP 1    95775161689 HC AALIYAH DEBRIDE OPEN WOUND UP TO 20CM 10/23/2017 Candie Inman, PT GP 1                Candie Inman, PT  10/23/2017

## 2017-10-26 ENCOUNTER — HOSPITAL ENCOUNTER (OUTPATIENT)
Dept: PHYSICAL THERAPY | Facility: HOSPITAL | Age: 72
Setting detail: THERAPIES SERIES
Discharge: HOME | End: 2017-10-26

## 2017-10-26 DIAGNOSIS — S81.801D OPEN WOUND OF RIGHT LOWER EXTREMITY, SUBSEQUENT ENCOUNTER: Primary | ICD-10-CM

## 2017-10-26 PROCEDURE — 97597 DBRDMT OPN WND 1ST 20 CM/<: CPT

## 2017-10-26 PROCEDURE — 97610 LOW FREQUENCY NON-THERMAL US: CPT

## 2017-10-26 NOTE — THERAPY WOUND CARE TREATMENT
Outpatient Rehabilitation - Wound/Debridement Treatment Note  [facility]     Patient Name: Vlad Arellano  : 1945  MRN: [id number]  Today's Date: 10/26/2017                Admit Date: 10/26/2017    Visit Dx:    ICD-10-CM ICD-9-CM   1. Open wound of right lower extremity, subsequent encounter S81.801D V58.89     891.0       Patient Active Problem List   Diagnosis   • Paroxysmal atrial fibrillation   • Tachy-krishna syndrome   • Benign hypertension   • Lower extremity edema   • PARKER (obstructive sleep apnea)   • Osteoarthritis   • Mild obesity   • MG, ocular (myasthenia gravis)   • Pain   • Hematoma of right thigh   • S/P Debridement irrigation and partial closure of cutaneous tissue right knee        Past Medical History:   Diagnosis Date   • Benign hypertension    • Lower extremity edema    • Mild obesity    • PARKER (obstructive sleep apnea)     requiring CPAP   • Osteoarthritis    • Paroxysmal atrial fibrillation    • Tachy-krishna syndrome         Past Surgical History:   Procedure Laterality Date   • CARDIOVERSION     • INCISION AND DRAINAGE LEG Right 2017    Procedure: INCISION AND DRAINAGE RIGHT KNEE;  Surgeon: Francisco Macias MD;  Location: [facility];  Service:    • NEPHROLITHOTRIPSY PERCUTANEOUS           EVALUATION        PT Ortho       10/26/17 0800    Subjective Comments    Subjective Comments No complaints, no new issues since last tx.  Asking if they need to schedule more txs.  -    Subjective Pain    Able to rate subjective pain? yes  -    Pre-Treatment Pain Level 0  -    Post-Treatment Pain Level 0  -    Transfers    Transfer, Comment supine on stretcher for tx  -    Gait Assessment/Treatment    Gait, [place] Level independent  -      User Key  (r) = Recorded By, (t) = Taken By, (c) = Cosigned By    Initials Name Provider Type    JULISSA Inman, PT Physical Therapist                    LDA Wound       10/26/17 0800          Incision 17 3631 Right leg     "Incision - Properties Group Placement Date: 08/18/17  -JV Placement Time: 1641 -JV Side: Right  - Location: leg  -JV    Incision WDL ex  -JM      Dressing Appearance moist drainage;intact   HFB mildly adherent to wound base  -JM      Appearance drainage;moist;open areas;pink   skin rolling into tunnel superiorly  -JM      Incision Length (cm) 1.5   previously closed area re-opened superficially  -JM      Incision Width (cm) 1  -JM      Incision Depth (cm) 0.1  -JM      Tunneling [depth (cm)/Location] 1.2cm@12:00  -      Drainage Characteristics/Odor serosanguineous;no odor  -JM      Drainage Amount small  -JM      Wound Cleaning cleansed with;other (see comments)   phase one  -      Wound Interventions debrided;other (see comments)   MIST 8 min  -      Dressing Dressing applied;petroleum-based dressing;low-adherent;foam   xeroform, 4\" optifoam  -JM      Packing --   tunnel too narrow for packing today  -        User Key  (r) = Recorded By, (t) = Taken By, (c) = Cosigned By    Initials Name Provider Type     Demarcus Harris, RN Registered Nurse    JULISSA Inman, PT Physical Therapist            WOUND DEBRIDEMENT  Debridement Site 1  Location- Site 1: R knee wound  Selective Debridement- Site 1: Wound Surface <20cmsq  Instruments- Site 1: tweezers  Excised Tissue Description- Site 1: scant, slough, other (comment) (rolling wound edge at tunnel)  Bleeding- Site 1: scant                   Therapy Education       10/26/17 0800          Therapy Education    Education Details Change of dressing to xeroform and optifoam gentle due to adherence of foam dressings.  Family to change PRN between txs.  -JM      Given Symptoms/condition management;Bandaging/dressing change  -      Program Reinforced  -JULISSA      How Provided Verbal  -JM      Provided to Patient  -JM      Level of Understanding Verbalized  -        User Key  (r) = Recorded By, (t) = Taken By, (c) = Cosigned By    Initials Name Provider Type    " JULISSA Inman, PT Physical Therapist          Recommendation and Plan        PT Assessment/Plan       10/26/17 0800       PT Assessment    Functional Limitations Other (comment)   wound mgmt  -     Impairments Integumentary integrity;Edema  -     Assessment Comments 12:00 tunnel too narrow for packing today, should close adequately without packing.  Superficial aspect of wound has re-opened due to adherence of foam dressing, so PT changed dressing type to xeroform to provide moist non-adherent dressing.  -     Rehab Potential Good  -     Patient/caregiver participated in establishment of treatment plan and goals Yes  -     Patient would benefit from skilled therapy intervention Yes  -     PT Plan    PT Frequency 2x/week  -     Physical Therapy Interventions (Optional Details) patient/family education;wound care  -     PT Plan Comments MIST, debridement PRN  -       User Key  (r) = Recorded By, (t) = Taken By, (c) = Cosigned By    Initials Name Provider Type    JULISSA Inman, PT Physical Therapist          Goals        PT OP Goals       10/26/17 0800       Time Calculation    PT Goal Re-Cert Due Date 12/17/17  -       User Key  (r) = Recorded By, (t) = Taken By, (c) = Cosigned By    Initials Name Provider Type    JULISSA Inman, PT Physical Therapist          PT Goal Re-Cert Due Date: 12/17/17            Time Calculation: Start Time: 0800    Therapy Charges for Today     Code Description Service Date Service Provider Modifiers Qty    29401392236 HC PT NLFU MIST 10/26/2017 Candie Inman, PT GP 1    47008885163 HC AALIYAH DEBRIDE OPEN WOUND UP TO 20CM 10/26/2017 Candie Inman, PT 60, GP 1                Candie Inman, PT  10/26/2017

## 2017-10-30 ENCOUNTER — HOSPITAL ENCOUNTER (OUTPATIENT)
Dept: PHYSICAL THERAPY | Facility: HOSPITAL | Age: 72
Setting detail: THERAPIES SERIES
Discharge: HOME | End: 2017-10-30

## 2017-10-30 DIAGNOSIS — S81.801D OPEN WOUND OF RIGHT LOWER EXTREMITY, SUBSEQUENT ENCOUNTER: Primary | ICD-10-CM

## 2017-10-30 PROCEDURE — 97597 DBRDMT OPN WND 1ST 20 CM/<: CPT

## 2017-10-30 NOTE — THERAPY WOUND CARE TREATMENT
Outpatient Rehabilitation - Wound/Debridement Treatment Note  [facility]     Patient Name: Vlad Arellano  : 1945  MRN: [id number]  Today's Date: 10/30/2017                Admit Date: 10/30/2017    Visit Dx:    ICD-10-CM ICD-9-CM   1. Open wound of right lower extremity, subsequent encounter S81.801D V58.89     891.0       Patient Active Problem List   Diagnosis   • Paroxysmal atrial fibrillation   • Tachy-krishna syndrome   • Benign hypertension   • Lower extremity edema   • PARKER (obstructive sleep apnea)   • Osteoarthritis   • Mild obesity   • MG, ocular (myasthenia gravis)   • Pain   • Hematoma of right thigh   • S/P Debridement irrigation and partial closure of cutaneous tissue right knee        Past Medical History:   Diagnosis Date   • Benign hypertension    • Lower extremity edema    • Mild obesity    • PARKER (obstructive sleep apnea)     requiring CPAP   • Osteoarthritis    • Paroxysmal atrial fibrillation    • Tachy-krishna syndrome         Past Surgical History:   Procedure Laterality Date   • CARDIOVERSION     • INCISION AND DRAINAGE LEG Right 2017    Procedure: INCISION AND DRAINAGE RIGHT KNEE;  Surgeon: Francisco Macias MD;  Location: [facility];  Service:    • NEPHROLITHOTRIPSY PERCUTANEOUS           EVALUATION        PT Ortho       10/30/17 0800    Subjective Comments    Subjective Comments No new issues or complaints.  -    Subjective Pain    Able to rate subjective pain? yes  -    Pre-Treatment Pain Level 0  -    Post-Treatment Pain Level 0  -    Transfers    Transfer, Comment supine on stretcher for tx.  -    Gait Assessment/Treatment    Gait, [place] Level independent  -      User Key  (r) = Recorded By, (t) = Taken By, (c) = Cosigned By    Initials Name Provider Type    JULISSA Inman, PT Physical Therapist                    LDA Wound       10/30/17 0800          Incision 17 1641 Right leg    Incision - Properties Group Placement Date: 17  -MARY ANN  "Placement Time: 1641 -JV Side: Right  -JV Location: leg  -JV    Incision WDL ex  -JM      Dressing Appearance moist drainage;intact  -JM      Appearance drainage;moist;open areas;pink   min hypertrophic crust, tunnel closed  -JM      Incision Length (cm) 0.6  -JM      Incision Width (cm) 0.3  -JM      Incision Depth (cm) 0   scant hypergranulation  -      Tunneling [depth (cm)/Location] 0  -JM      Drainage Characteristics/Odor serosanguineous;no odor  -JM      Drainage Amount small  -JM      Wound Cleaning cleansed with;other (see comments)   phase one  -      Wound Interventions debrided  -      Dressing Dressing applied;petroleum-based dressing;low-adherent;foam   xeroform, 4\" optifoam gentle  -        User Key  (r) = Recorded By, (t) = Taken By, (c) = Cosigned By    Initials Name Provider Type     Demarcus Harris, RN Registered Nurse    JULISSA Inman, PT Physical Therapist            WOUND DEBRIDEMENT  Debridement Site 1  Location- Site 1: R knee wound  Selective Debridement- Site 1: Wound Surface <20cmsq  Instruments- Site 1: tweezers, scissors  Excised Tissue Description- Site 1: scant, slough, minimum, other (comment) (hypertrophic crust)  Bleeding- Site 1: none                   Therapy Education       10/30/17 0800          Therapy Education    Education Details Change dressing every 3-4 days, using xeroform and 4\" optifoam gentle.  Decrease tx freq to once/week.  -      Given Symptoms/condition management;Bandaging/dressing change  -      Program Reinforced  -      How Provided Verbal  -      Provided to Patient;Other (comment)   spouse  -      Level of Understanding Verbalized  -        User Key  (r) = Recorded By, (t) = Taken By, (c) = Cosigned By    Initials Name Provider Type    JULISSA Inman, PT Physical Therapist          Recommendation and Plan        PT Assessment/Plan       10/30/17 0800       PT Assessment    Functional Limitations Other (comment)   wound mgmt  " -     Impairments Integumentary integrity;Edema  -     Assessment Comments Tunnel now closed, only remaining area is central superficial aspect, measuring 0.6cm x 0.3cm today.  Min hypertrophic crust and scant slough buildup requiring debridement.  Deferred MIST due to decrease in wound area.  Pt appropriate to decrease to once/week, with next tx pt likely to be d/c.  -     Rehab Potential Good  -     Patient/caregiver participated in establishment of treatment plan and goals Yes  -     Patient would benefit from skilled therapy intervention Yes  -     PT Plan    PT Frequency 1x/week  -     Physical Therapy Interventions (Optional Details) patient/family education;wound care  -     PT Plan Comments follow-up next week, ?d/c  -       User Key  (r) = Recorded By, (t) = Taken By, (c) = Cosigned By    Initials Name Provider Type    JULISSA Inman, PT Physical Therapist          Goals        PT OP Goals       10/30/17 0800       Time Calculation    PT Goal Re-Cert Due Date 12/17/17  -       User Key  (r) = Recorded By, (t) = Taken By, (c) = Cosigned By    Initials Name Provider Type    JULISSA Inman, PT Physical Therapist          PT Goal Re-Cert Due Date: 12/17/17            Time Calculation: Start Time: 0800    Therapy Charges for Today     Code Description Service Date Service Provider Modifiers Qty    45239526985 HC AALIYAH DEBRIDE OPEN WOUND UP TO 20CM 10/30/2017 Candie Inman, PT GP 1                Candie Inman, PT  10/30/2017

## 2017-11-02 ENCOUNTER — APPOINTMENT (OUTPATIENT)
Dept: PHYSICAL THERAPY | Facility: HOSPITAL | Age: 72
End: 2017-11-02

## 2017-11-06 ENCOUNTER — HOSPITAL ENCOUNTER (OUTPATIENT)
Dept: PHYSICAL THERAPY | Facility: HOSPITAL | Age: 72
Setting detail: THERAPIES SERIES
Discharge: HOME | End: 2017-11-06

## 2017-11-06 DIAGNOSIS — S81.801D OPEN WOUND OF RIGHT LOWER EXTREMITY, SUBSEQUENT ENCOUNTER: Primary | ICD-10-CM

## 2017-11-06 PROCEDURE — G8990 OTHER PT/OT CURRENT STATUS: HCPCS

## 2017-11-06 PROCEDURE — 97110 THERAPEUTIC EXERCISES: CPT

## 2017-11-06 PROCEDURE — G8991 OTHER PT/OT GOAL STATUS: HCPCS

## 2017-11-06 PROCEDURE — G8992 OTHER PT/OT  D/C STATUS: HCPCS

## 2017-11-06 NOTE — THERAPY DISCHARGE NOTE
Outpatient Rehabilitation - Wound/Debridement Treatment Note &  Discharge Summary   [place]     Patient Name: Vlad Arellano  : 1945  MRN: [id number]  Today's Date: 2017                Admit Date: 2017    Visit Dx:    ICD-10-CM ICD-9-CM   1. Open wound of right lower extremity, subsequent encounter S81.801D V58.89     891.0   Medial R knee:      Patient Active Problem List   Diagnosis   • Paroxysmal atrial fibrillation   • Tachy-krishna syndrome   • Benign hypertension   • Lower extremity edema   • PARKER (obstructive sleep apnea)   • Osteoarthritis   • Mild obesity   • MG, ocular (myasthenia gravis)   • Pain   • Hematoma of right thigh   • S/P Debridement irrigation and partial closure of cutaneous tissue right knee        Past Medical History:   Diagnosis Date   • Benign hypertension    • Lower extremity edema    • Mild obesity    • PARKER (obstructive sleep apnea)     requiring CPAP   • Osteoarthritis    • Paroxysmal atrial fibrillation    • Tachy-krishna syndrome         Past Surgical History:   Procedure Laterality Date   • CARDIOVERSION     • INCISION AND DRAINAGE LEG Right 2017    Procedure: INCISION AND DRAINAGE RIGHT KNEE;  Surgeon: Francisco Macias MD;  Location: [facility];  Service:    • NEPHROLITHOTRIPSY PERCUTANEOUS           EVALUATION        PT Ortho       17 0810    Subjective Comments    Subjective Comments Pt without complaints, wife states she thinks the wound is closed, changed the dressing as instructed by PT.  -JULISSA    Subjective Pain    Able to rate subjective pain? yes  -JULISSA    Pre-Treatment Pain Level 0  -JULISSA    Post-Treatment Pain Level 0  -JM    Transfers    Transfer, Comment supine on stretcher for tx  -JULISSA    Gait Assessment/Treatment    Gait, [place] Level independent  -JULISSA      User Key  (r) = Recorded By, (t) = Taken By, (c) = Cosigned By    Initials Name Provider Type    JULISSA Inman, PT Physical Therapist                    LDA Wound        "11/06/17 0810          [REMOVED] Incision 08/18/17 1641 Right leg    Incision - Properties Group Placement Date: 08/18/17  -JV Placement Time: 1641  -JV Side: Right  -JV Location: leg  -JV Removal Date: 11/06/17  -JM Removal Time: 0810  -JM    Incision WDL WDL  -JM      Dressing Appearance intact;dry  -JM      Appearance pink;well approximated   closed/resurfaced  -JM      Incision Length (cm) 0  -JM      Incision Width (cm) 0  -JM      Incision Depth (cm) 0  -JM      Drainage Amount none  -JM      Wound Cleaning cleansed with;other (see comments)   phase one  -JM      Dressing Dressing applied;petroleum-based dressing;low-adherent;foam   xeroform, 4\" optifoam gentle  -        User Key  (r) = Recorded By, (t) = Taken By, (c) = Cosigned By    Initials Name Provider Type     Demarcus Harris, RN Registered Nurse    JULISSA Inman, PT Physical Therapist            WOUND DEBRIDEMENT                Therapy Education  Education Details: Continue to use xeroform and optifoam gentle for another week to protect fragile new skin, then may leave open to air.  Pt ok to shower without dressing on leg.  Discharge from PT today.  Given: Symptoms/condition management, Bandaging/dressing change  Program: Reinforced  How Provided: Verbal  Provided to: Patient, Other (comment) (spouse)  Level of Understanding: Verbalized          Therapy Education       11/06/17 0810          Therapy Education    Education Details Continue to use xeroform and optifoam gentle for another week to protect fragile new skin, then may leave open to air.  Pt ok to shower without dressing on leg.  Discharge from PT today.  -JM      Given Symptoms/condition management;Bandaging/dressing change  -      Program Reinforced  -      How Provided Verbal  -JM      Provided to Patient;Other (comment)   spouse  -      Level of Understanding Verbalized  -        User Key  (r) = Recorded By, (t) = Taken By, (c) = Cosigned By    Initials Name Provider Type "    JULISSA Inman PT Physical Therapist          Recommendation and Plan        PT Assessment/Plan       11/06/17 0810       PT Assessment    Functional Limitations Other (comment)   wound mgmt  -     Impairments Integumentary integrity;Edema  -     Assessment Comments Medial R knee wound now closed/resurfaced.  Pt has met all PT goals, no longer requires skilled therapy services, will be discharged today.  -     PT Plan    PT Frequency Other (comment)   discharge  -     PT Plan Comments d/c today  -       User Key  (r) = Recorded By, (t) = Taken By, (c) = Cosigned By    Initials Name Provider Type    JULISSA Inman, PT Physical Therapist          Goals        PT OP Goals       11/06/17 0810       PT Short Term Goals    STG 1 Patient and/ or caregiver able to verbalize signs and symptoms of infection.  -     STG 1 Progress Met  -     STG 2 Decrease wound dimensions by 25% as evidence of wound closure.  -     STG 2 Progress Met  -     STG 3 Pt will demonstrate 50% reduction in induration of periwound to indicate progress.  -     STG 3 Progress Met  -     STG 4 Pt will demonstrate reduction in tunneled area by 2 cm to indicate progress.  -     STG 4 Progress Met  -     Long Term Goals    LTG 1 Patient and/ or caregiver independent with clean dressing changes.  -     LTG 1 Progress Met  -     LTG 2 Decrease wound dimensions by 75% as evidence of wound closure.  -     LTG 2 Progress Met  -     LTG 3 Pt will demonstrate 75% reduction in periwound induration to indicate progress.  -     LTG 3 Progress Met  -     LTG 4 Pt will demonstrate reduction in tunneled area by 6 cm to indicate healing progress.  -     LTG 4 Progress Met  -     Time Calculation    PT Goal Re-Cert Due Date 12/17/17  -       User Key  (r) = Recorded By, (t) = Taken By, (c) = Cosigned By    Initials Name Provider Type    JULISSA Inman, PT Physical Therapist          Time Calculation: Start  Time: 0810  Total Timed Code Minutes- PT: 10 minute(s)    Therapy Charges for Today     Code Description Service Date Service Provider Modifiers Qty    35674953301 HC PT OTHER PRIME FUNCT CURRENT 11/6/2017 Candie Inman, PT GP, [place] 1    89134278639 HC PT OTHER PRIME FUNCT PROJECTED 11/6/2017 Candie Inman, PT GP, [place] 1    48710856233 HC PT OTHER PRIME FUNCT DISCHARGE 11/6/2017 Candie Inman, PT GP,  1    52791262001 HC PT THER PROC EA 15 MIN 11/6/2017 Candie Inman, PT GP 1          PT G-Codes  Outcome Measure Options: BWAT ([place] Wound Assess Tool)  Score: BWAT 9; 0% impairment  Functional Limitation: Other PT primary  Other PT Primary Current Status (): 0 percent impaired, limited or restricted  Other PT Primary Goal Status (): 0 percent impaired, limited or restricted  Other PT Primary Discharge Status (): 0 percent impaired, limited or restricted           OP Discharge Summary       11/06/17 0810          OP PT Discharge Summary    Date of Discharge 11/06/17  -      Reason for Discharge All goals achieved;Independent  -      Outcomes Achieved Able to achieve all goals within established timeline  -      Discharge Destination Home without follow-up  -      Discharge Instructions Continue to use xeroform and optifoam gentle x 1 week, then may leave [facility].  Pt OK to shower.  -        User Key  (r) = Recorded By, (t) = Taken By, (c) = Cosigned By    Initials Name Provider Type    JULISSA Inman, PT Physical Therapist          Candie Inman, PT  11/6/2017

## 2017-11-09 ENCOUNTER — APPOINTMENT (OUTPATIENT)
Dept: PHYSICAL THERAPY | Facility: HOSPITAL | Age: 72
End: 2017-11-09

## 2018-01-10 RX ORDER — DRONEDARONE 400 MG/1
TABLET, FILM COATED ORAL
Qty: 60 TABLET | Refills: 5 | Status: SHIPPED | OUTPATIENT
Start: 2018-01-10 | End: 2018-08-22 | Stop reason: SDUPTHER

## 2018-08-22 ENCOUNTER — OFFICE VISIT (OUTPATIENT)
Dept: CARDIOLOGY | Facility: CLINIC | Age: 73
End: 2018-08-22

## 2018-08-22 VITALS
HEART RATE: 52 BPM | SYSTOLIC BLOOD PRESSURE: 146 MMHG | DIASTOLIC BLOOD PRESSURE: 76 MMHG | HEIGHT: 74 IN | WEIGHT: 259.2 LBS | BODY MASS INDEX: 33.26 KG/M2

## 2018-08-22 DIAGNOSIS — I10 BENIGN HYPERTENSION: ICD-10-CM

## 2018-08-22 DIAGNOSIS — I49.5 TACHY-BRADY SYNDROME (HCC): ICD-10-CM

## 2018-08-22 DIAGNOSIS — G47.33 OSA (OBSTRUCTIVE SLEEP APNEA): ICD-10-CM

## 2018-08-22 DIAGNOSIS — I48.0 PAROXYSMAL ATRIAL FIBRILLATION (HCC): Primary | ICD-10-CM

## 2018-08-22 PROCEDURE — 99213 OFFICE O/P EST LOW 20 MIN: CPT | Performed by: INTERNAL MEDICINE

## 2018-08-22 PROCEDURE — 93000 ELECTROCARDIOGRAM COMPLETE: CPT | Performed by: INTERNAL MEDICINE

## 2018-08-22 RX ORDER — TORSEMIDE 20 MG/1
20 TABLET ORAL DAILY
Qty: 90 TABLET | Refills: 3 | Status: SHIPPED | OUTPATIENT
Start: 2018-08-22 | End: 2021-10-13 | Stop reason: SDUPTHER

## 2018-08-22 NOTE — PROGRESS NOTES
Vlad Arellano  1945  [phone]  [phone]    08/22/2018    Saleem Yang MD    Chief Complaint   Patient presents with   • Atrial Fibrillation   • Hypertension       Problem List:  1. Paroxysmal atrial fibrillation:  a. Diagnosed 4 years ago with external cardioversion x3.  b. CHADS-Vasc = 2.  On Xarelto.  c. Most recent external cardioversion on Multaq, 04/21/2016. Successful conversion to sinus rhythm. Cessation of Multaq and initiation of sotalol with hospitalization.  d. ER visit 1/18/17 for AFib with RVR.  2. Tachy-krishna syndrome.   3. Benign hypertension.   4. Lower extremity edema.   5. Obstructive sleep apnea requiring CPAP.   6. Osteoarthritis.   7. Mild obesity.   8. Family history of hypertension.   9. Right knee subcutaneous hematoma/seroma with skin necrosis. S/P debridement and partial closure.  Following with IV antibiotics, wound care.  10. Surgical history:   a. External cardioversion x3.  b. Lithotripsy.    Allergies   Allergen Reactions   • Naproxen      Patient states he has made changes on that       Current Medications:      Current Outpatient Prescriptions:   •  cefTRIAXone (ROCEPHIN) 40 MG/ML IVPB, Infuse 50 mL into a venous catheter Daily. Per [facility]  Indications: Bone and/or Joint Infection, Disp: , Rfl: 0  •  dronedarone (MULTAQ) 400 MG tablet, Take 1 tablet by mouth 2 (Two) Times a Day., Disp: 180 tablet, Rfl: 3  •  meloxicam (MOBIC) 7.5 MG tablet, Take 1 tablet by mouth daily., Disp: 30 tablet, Rfl: 6  •  rivaroxaban (XARELTO) 20 MG tablet, Take 1 tablet by mouth Daily., Disp: 90 tablet, Rfl: 3  •  sertraline (ZOLOFT) 50 MG tablet, Take 50 mg by mouth Daily., Disp: , Rfl:   •  torsemide (DEMADEX) 20 MG tablet, Take 1 tablet by mouth Daily., Disp: 90 tablet, Rfl: 3    HPI    Vlad Arellano is a pleasant 73-year-old male who presents today for 6 months follow up of PAF, tachy-krishna syndrome, and hypertension. Since last visit, patient has been doing well from a cardiac  "standpoint.  He remains very active in farming and has no complaints of chest pain or shortness of breath.  He also has had a very good year and not having any rebound episodes of his paroxysmal atrial fibrillation.  He has learned that dehydration can sometimes be a trigger as well as stressful events.  He continues to wear his CPAP faithfully for his sleep apnea and I think this has contributed to his success with his A. fib as well.  He denies any complaints of chest pain, shortness of breath, dyspnea on exertion, fatigue, palpitations, dizziness and syncope.  He tends to chronically have a trace amount of bilateral lower extremity edema.  Overall he is doing very well from a cardiac standpoint.  His levels were recently checked in June with his primary care physician.  We do not have these results at this time.    The following portions of the patient's history were reviewed and updated as appropriate: allergies, current medications and problem list.    Pertinent positives as listed in the HPI.  All other systems reviewed are negative.    Vitals:    08/22/18 1140   BP: 146/76   BP Location: Left arm   Patient Position: Sitting   Pulse: 52   Weight: 118 kg (259 lb 3.2 oz)   Height: 188 cm (74\")       Physical Exam:  GENERAL: well-developed, well-nourished; in no acute distress.   NECK:  There is no jugular venous distention at 30°.  Carotid upstrokes are 2+ and  symmetrical without bruits.   LUNGS: Clear to auscultation bilaterally without wheezing, rhonchi, or rales noted.   CARDIOVASCULAR: The heart has a regular bradycardic rate with a normal S1 and S2. There is no murmur, gallop, rub, or click appreciated. The PMI is nondisplaced.   ABDOMEN: Soft and nontender  NEUROLOGICAL: Nonfocal; Alert and oriented  PERIPHERAL VASCULAR:  Posterior tibial and dorsalis pedis pulses are 2+ and symmetrical. There is trace peripheral edema.   MUSCULOSKELETAL:  Normal ROM  SKIN:  Warm and dry  PSYCHIATRIC: normal mood and " affect; behavior appropriate    Diagnostic Data:    Lab Results   Component Value Date    GLUCOSE 120 (H) 09/05/2017    BUN 17 09/05/2017    CREATININE 1.30 09/05/2017    EGFRIFNONA 54 (L) 09/05/2017    BCR 13.1 09/05/2017    K 4.7 09/05/2017    CO2 31.0 09/05/2017    CALCIUM 9.1 09/05/2017    ALBUMIN 3.80 09/05/2017    AST 26 09/05/2017    ALT 20 09/05/2017     Lab Results   Component Value Date    WBC 4.87 09/05/2017    HGB 13.8 09/05/2017    HCT 42.0 09/05/2017    MCV 94.8 09/05/2017     09/05/2017     Lab Results   Component Value Date    GLUCOSE 120 (H) 09/05/2017    CALCIUM 9.1 09/05/2017     09/05/2017    K 4.7 09/05/2017    CO2 31.0 09/05/2017     09/05/2017    BUN 17 09/05/2017    CREATININE 1.30 09/05/2017    EGFRIFNONA 54 (L) 09/05/2017    BCR 13.1 09/05/2017    ANIONGAP 8.0 09/05/2017         ECG 12 Lead  Date/Time: 8/22/2018 12:40 PM  Performed by: ARNULFO ALATORRE  Authorized by: ARNULFO ALATORRE   Rhythm: sinus bradycardia  BPM: 52  Conduction: incomplete RBBB  Clinical impression: abnormal ECG  Comments: ST and T wave abnormality, consider anterior ischemia  No change since 2017        Assessment:      ICD-10-CM ICD-9-CM   1. Paroxysmal atrial fibrillation (CMS/HCC) I48.0 427.31   2. Tachy-krishna syndrome (CMS/HCC) I49.5 427.81   3. Benign hypertension I10 401.1   4. PARKER (obstructive sleep apnea) G47.33 327.23       Plan:  1. Encouraged routine exercise and dietary modifications  2. Continue current medications.  3. F/up in 12 months or sooner if needed.    Scribed for Namrata Kaur MD by Arnulfo Alatorre, LUCAS. 8/22/2018  12:46 PM     I Namrata Kaur MD personally performed the services described in this documentation as scribed by the above individual in my presence, and it is both accurate and complete.    Namrata Kaur MD, FACC

## 2018-12-09 RX ORDER — MELOXICAM 7.5 MG/1
7.5 TABLET ORAL DAILY
Qty: 30 TABLET | Refills: 6 | Status: SHIPPED | OUTPATIENT
Start: 2018-12-09 | End: 2020-09-16

## 2018-12-09 RX ORDER — AMOXICILLIN 500 MG/1
500 CAPSULE ORAL 3 TIMES DAILY
Qty: 21 CAPSULE | Refills: 0 | Status: SHIPPED | OUTPATIENT
Start: 2018-12-09 | End: 2018-12-16

## 2019-06-18 DIAGNOSIS — R06.02 SHORTNESS OF BREATH: Primary | ICD-10-CM

## 2019-06-18 RX ORDER — ALBUTEROL SULFATE 90 UG/1
2 AEROSOL, METERED RESPIRATORY (INHALATION)
Status: DISCONTINUED | OUTPATIENT
Start: 2019-06-18 | End: 2019-09-04

## 2019-08-26 RX ORDER — DRONEDARONE 400 MG/1
TABLET, FILM COATED ORAL
Qty: 180 TABLET | Refills: 0 | Status: SHIPPED | OUTPATIENT
Start: 2019-08-26 | End: 2019-11-22 | Stop reason: SDUPTHER

## 2019-09-04 ENCOUNTER — OFFICE VISIT (OUTPATIENT)
Dept: CARDIOLOGY | Facility: CLINIC | Age: 74
End: 2019-09-04

## 2019-09-04 VITALS
HEIGHT: 75 IN | SYSTOLIC BLOOD PRESSURE: 142 MMHG | DIASTOLIC BLOOD PRESSURE: 66 MMHG | BODY MASS INDEX: 32.33 KG/M2 | WEIGHT: 260 LBS | HEART RATE: 62 BPM

## 2019-09-04 DIAGNOSIS — I48.0 PAROXYSMAL ATRIAL FIBRILLATION (HCC): Primary | ICD-10-CM

## 2019-09-04 DIAGNOSIS — I10 ESSENTIAL HYPERTENSION: ICD-10-CM

## 2019-09-04 DIAGNOSIS — I49.5 TACHY-BRADY SYNDROME (HCC): ICD-10-CM

## 2019-09-04 PROCEDURE — 93000 ELECTROCARDIOGRAM COMPLETE: CPT | Performed by: INTERNAL MEDICINE

## 2019-09-04 PROCEDURE — 99214 OFFICE O/P EST MOD 30 MIN: CPT | Performed by: INTERNAL MEDICINE

## 2019-09-04 NOTE — PROGRESS NOTES
[facility] Cardiology  Vlad Arellano  1945  74 y.o.  [phone]      Date: 09/04/2019    PCP: Saleem Yang MD    Chief Complaint   Patient presents with   • Paroxysmal atrial fibrillation (CMS/HCC)       Problem List:  1. Paroxysmal atrial fibrillation:  a. Diagnosed 4 years ago with external cardioversion x3.  b. CHADS-Vasc = 2 (HTN, Age).  On Xarelto.  c. Most recent external cardioversion on Multaq, 04/21/2016: Successful conversion to sinus rhythm. Cessation of Multaq and initiation of sotalol with hospitalization.  d. ER visit, 01/18/17 for AFib with RVR.  2. Tachy-krishna syndrome.   3. Hypertension.   4. Lower extremity edema.   5. PARKER requiring CPAP.   6. Osteoarthritis.   7. Mild obesity.   8. Family history of hypertension.   9. Right knee subcutaneous hematoma/seroma with skin necrosis. S/P debridement and partial closure. Following with IV antibiotics, wound care.  10. Surgical history:   a. External cardioversion x3.  b. Lithotripsy.    Allergies   Allergen Reactions   • Naproxen      Patient states he has made changes on that       Current Medications:      Current Outpatient Medications:   •  meloxicam (MOBIC) 7.5 MG tablet, Take 1 tablet by mouth Daily., Disp: 30 tablet, Rfl: 6  •  MULTAQ 400 MG tablet, TAKE ONE TABLET BY MOUTH TWICE A DAY, Disp: 180 tablet, Rfl: 0  •  rivaroxaban (XARELTO) 20 MG tablet, Take 1 tablet by mouth Daily., Disp: 90 tablet, Rfl: 3  •  sertraline (ZOLOFT) 50 MG tablet, Take 50 mg by mouth Daily., Disp: , Rfl:   •  torsemide (DEMADEX) 20 MG tablet, Take 1 tablet by mouth Daily., Disp: 90 tablet, Rfl: 3  No current facility-administered medications for this visit.     HPI    Vlad Arellano is a 74 y.o. male who presents today for annual follow up of PAF, tachy-krishna syndrome, and hypertension. Since last visit, he has been feeling well overall from a cardiovascular standpoint. 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 He monitors his blood pressure at home, with readings typically around 140 mmHg systolic, sometimes to 150 mmHg. Patient admits he eats \"a lot\" of salt. Patient denies chest pain, palpitations, shortness of breath, edema, dizziness, and syncope.     The following portions of the patient's history were reviewed and updated as appropriate: allergies, current medications and problem list.    Pertinent positives as listed in the HPI.  All other systems reviewed are negative.    Vitals:    09/04/19 1032   BP: 142/66   BP Location: Right arm   Patient Position: Sitting   Pulse: 62   Weight: 118 kg (260 lb)   Height: 190.5 cm (75\")       Physical Exam:    General: Alert and oriented.  Neck: Jugular venous pressure is within normal limits. Carotids have normal upstrokes without bruits.   Cardiovascular: Heart has a nondisplaced focal PMI. Regular rate and rhythm without murmur, gallop or rub.  Lungs: Clear without rales or wheezes. Equal expansion is noted.   Extremities: Show no edema.  Skin: Warm and dry.  Neurologic: Nonfocal.    Diagnostic Data:      ECG 12 Lead  Date/Time: 9/4/2019 10:37 AM  Performed by: Namrata Kaur MD  Authorized by: Namrata Kaur MD   Comparison: compared with previous ECG from 8/22/2018  Similar to previous ECG  Rhythm: sinus rhythm  BPM: 62  Other findings: non-specific ST-T wave changes    Clinical impression: abnormal EKG  Comments: Normal QT interval            Assessment:      ICD-10-CM ICD-9-CM   1. Paroxysmal atrial fibrillation (CMS/[facility]) I48.0 427.31   2. Tachy-krishna syndrome (CMS/[facility]) I49.5 427.81   3. Essential hypertension I10 401.9     ECG results found above were reviewed with the patient.    Plan:    1. Decrease sodium intake for management of blood pressure.  2. The patient is to call if his systolic pressures remain greater than 140.  We will will consider the initiation of terazosin 1 mg nightly or lisinopril.  3. Continue amiodarone " (Multaq) for rhythm control with PAF. Continue Xarelto 20 mg for stroke prophylaxis.  4. Continue torsemide for fluid retention.  5. Continue all other current medications.  6. F/up in 12 months or sooner if needed.      Scribed for Namrata Kaur MD by Marguerite Navarro. 9/4/2019  10:41 AM     Namrata Kaur MD, [facility]

## 2019-11-22 RX ORDER — DRONEDARONE 400 MG/1
TABLET, FILM COATED ORAL
Qty: 180 TABLET | Refills: 0 | Status: SHIPPED | OUTPATIENT
Start: 2019-11-22 | End: 2020-02-03

## 2020-09-16 ENCOUNTER — OFFICE VISIT (OUTPATIENT)
Dept: CARDIOLOGY | Facility: CLINIC | Age: 75
End: 2020-09-16

## 2020-09-16 VITALS
DIASTOLIC BLOOD PRESSURE: 66 MMHG | HEART RATE: 87 BPM | WEIGHT: 263 LBS | HEIGHT: 75 IN | SYSTOLIC BLOOD PRESSURE: 120 MMHG | BODY MASS INDEX: 32.7 KG/M2

## 2020-09-16 DIAGNOSIS — I48.11 LONGSTANDING PERSISTENT ATRIAL FIBRILLATION (HCC): Primary | ICD-10-CM

## 2020-09-16 DIAGNOSIS — I10 ESSENTIAL HYPERTENSION: ICD-10-CM

## 2020-09-16 DIAGNOSIS — I49.5 TACHY-BRADY SYNDROME (HCC): ICD-10-CM

## 2020-09-16 PROCEDURE — 93000 ELECTROCARDIOGRAM COMPLETE: CPT | Performed by: INTERNAL MEDICINE

## 2020-09-16 PROCEDURE — 99214 OFFICE O/P EST MOD 30 MIN: CPT | Performed by: INTERNAL MEDICINE

## 2020-09-16 NOTE — PROGRESS NOTES
[facility] Cardiology    Patient ID: Vlad Arellano is a 75 y.o. male.  : 1945   Contact: [phone]    Encounter date: 2020    PCP: Saleem Yang MD      Chief complaint:   Chief Complaint   Patient presents with   • Paroxysmal atrial fibrillation (CMS/HCC)       Problem List:  1. Paroxysmal atrial fibrillation:  a. Diagnosed 4 years ago with external cardioversion x3.  b. CHADS-Vasc = 2 (HTN, Age).  On Xarelto.  c. Most recent external cardioversion on Multaq, 2016: Successful conversion to sinus rhythm. Cessation of Multaq and initiation of sotalol with hospitalization.  d. ER visit, 17 for AFib with RVR.  2. Tachy-krishna syndrome.   3. Hypertension.   4. Lower extremity edema.   5. PARKER requiring CPAP.   6. Osteoarthritis.   7. Mild obesity.   8. Family history of hypertension.   9. Right knee subcutaneous hematoma/seroma with skin necrosis. S/P debridement and partial closure. Following with IV antibiotics, wound care.  10. Surgical history:   a. External cardioversion x3.  b. Lithotripsy.    Allergies   Allergen Reactions   • Naproxen      Patient states he has made changes on that       Current Medications:    Current Outpatient Medications:   •  dronedarone (MULTAQ) 400 MG tablet, Take 1 tablet by mouth Every 12 (Twelve) Hours., Disp: 180 tablet, Rfl: 2  •  rivaroxaban (XARELTO) 20 MG tablet, Take 1 tablet by mouth Daily., Disp: 90 tablet, Rfl: 3  •  sertraline (ZOLOFT) 50 MG tablet, Take 50 mg by mouth Daily., Disp: , Rfl:   •  torsemide (DEMADEX) 20 MG tablet, Take 1 tablet by mouth Daily., Disp: 90 tablet, Rfl: 3    HPI    Vlad Arellano is a 75 y.o. male who presents today for an annual follow up of paroxysmal atrial fibrillation, tachy-krishna syndrome, and essential hypertension. Since last visit, he has been feeling well overall from a cardiovascular standpoint. He is not able to tell that he is in atrial fibrillation at today's visit.  He  "has no idea how long he has been in atrial fibrillation.  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 He reportedly had laboratory studies done approximately 3 months ago per Dr. Yang. Patient otherwise denies chest pain, shortness of breath, PND, edema, syncope, or presyncope at this time.      The following portions of the patient's history were reviewed and updated as appropriate: allergies, current medications and problem list.    Pertinent positives as listed in the HPI.  All other systems reviewed are negative.         Vitals:    09/16/20 1124   BP: 120/66   BP Location: Left arm   Patient Position: Sitting   Pulse: 87   Weight: 119 kg (263 lb)   Height: 190.5 cm (75\")       Physical Exam:  General: Alert and oriented.  Neck: Jugular venous pressure is within normal limits. Carotids have normal upstrokes without bruits.   Cardiovascular: Heart has a nondisplaced focal PMI. Irregular rate. No murmur, gallop or rub.   Lungs: Clear, no rales or wheezes. Equal expansion is noted.   Extremities: Show no edema.  Skin: Warm and dry.  Neurologic: Nonfocal.     Diagnostic Data (reviewed with patient):         ECG 12 Lead    Date/Time: 9/16/2020 11:35 AM  Performed by: Namrata Kaur MD  Authorized by: Namrata Kaur MD   Comparison: compared with previous ECG from 9/4/2019  Comparison to previous ECG: Atrial fibrillation at today's visit.   Rhythm: atrial fibrillation  Conduction: incomplete right bundle branch block  Comments: ST & T wave abnormality              Assessment:    ICD-10-CM ICD-9-CM   1. Longstanding persistent atrial fibrillation (CMS/[facility])  I48.11 427.31   2. Tachy-krishna syndrome (CMS/[facility])  I49.5 427.81   3. Essential hypertension  I10 401.9         Plan:  1. Discontinue Multaq due to persistent atrial fibrillation.   2. Continue Xarelto 20 mg for stroke prophylaxis.  3. Continue torsemide for fluid retention.  4. Continue all other current " medications.  5. F/up in 12 months, sooner if needed.    Scribed for Namrata Kaur MD by Zofia Mares. 9/16/2020  12:32 EDT    I Namrata Kaur MD personally performed the services described in this documentation as scribed by the above individual in my presence, and it is both accurate and complete.    Namrata Kaur MD, FACC

## 2021-07-30 LAB
ALBUMIN SERPL-MCNC: 3.9 G/DL (ref 3.5–5.2)
ALBUMIN/GLOB SERPL: 1.3 G/DL
ALP SERPL-CCNC: 80 U/L (ref 39–117)
ALT SERPL W P-5'-P-CCNC: 14 U/L (ref 1–41)
ANION GAP SERPL CALCULATED.3IONS-SCNC: 12 MMOL/L (ref 5–15)
AST SERPL-CCNC: 27 U/L (ref 1–40)
BASOPHILS # BLD AUTO: 0.04 10*3/MM3 (ref 0–0.2)
BASOPHILS NFR BLD AUTO: 0.3 % (ref 0–1.5)
BILIRUB SERPL-MCNC: 1.6 MG/DL (ref 0–1.2)
BUN SERPL-MCNC: 13 MG/DL (ref 8–23)
BUN/CREAT SERPL: 12.9 (ref 7–25)
CALCIUM SPEC-SCNC: 9 MG/DL (ref 8.6–10.5)
CHLORIDE SERPL-SCNC: 97 MMOL/L (ref 98–107)
CO2 SERPL-SCNC: 29 MMOL/L (ref 22–29)
CREAT SERPL-MCNC: 1.01 MG/DL (ref 0.76–1.27)
DEPRECATED RDW RBC AUTO: 46.1 FL (ref 37–54)
EOSINOPHIL # BLD AUTO: 0.12 10*3/MM3 (ref 0–0.4)
EOSINOPHIL NFR BLD AUTO: 1 % (ref 0.3–6.2)
ERYTHROCYTE [DISTWIDTH] IN BLOOD BY AUTOMATED COUNT: 13.1 % (ref 12.3–15.4)
GFR SERPL CREATININE-BSD FRML MDRD: 72 ML/MIN/1.73
GLOBULIN UR ELPH-MCNC: 2.9 GM/DL
GLUCOSE SERPL-MCNC: 170 MG/DL (ref 65–99)
HCT VFR BLD AUTO: 45 % (ref 37.5–51)
HGB BLD-MCNC: 15.4 G/DL (ref 13–17.7)
HOLD SPECIMEN: NORMAL
IMM GRANULOCYTES # BLD AUTO: 0.04 10*3/MM3 (ref 0–0.05)
IMM GRANULOCYTES NFR BLD AUTO: 0.3 % (ref 0–0.5)
LYMPHOCYTES # BLD AUTO: 1.15 10*3/MM3 (ref 0.7–3.1)
LYMPHOCYTES NFR BLD AUTO: 9.5 % (ref 19.6–45.3)
MCH RBC QN AUTO: 33.4 PG (ref 26.6–33)
MCHC RBC AUTO-ENTMCNC: 34.2 G/DL (ref 31.5–35.7)
MCV RBC AUTO: 97.6 FL (ref 79–97)
MONOCYTES # BLD AUTO: 0.8 10*3/MM3 (ref 0.1–0.9)
MONOCYTES NFR BLD AUTO: 6.6 % (ref 5–12)
NEUTROPHILS NFR BLD AUTO: 82.3 % (ref 42.7–76)
NEUTROPHILS NFR BLD AUTO: 9.96 10*3/MM3 (ref 1.7–7)
NRBC BLD AUTO-RTO: 0 /100 WBC (ref 0–0.2)
PLATELET # BLD AUTO: 193 10*3/MM3 (ref 140–450)
PMV BLD AUTO: 9.3 FL (ref 6–12)
POTASSIUM SERPL-SCNC: 3.7 MMOL/L (ref 3.5–5.2)
PROT SERPL-MCNC: 6.8 G/DL (ref 6–8.5)
RBC # BLD AUTO: 4.61 10*6/MM3 (ref 4.14–5.8)
SODIUM SERPL-SCNC: 138 MMOL/L (ref 136–145)
WBC # BLD AUTO: 12.11 10*3/MM3 (ref 3.4–10.8)

## 2021-07-30 PROCEDURE — 85025 COMPLETE CBC W/AUTO DIFF WBC: CPT

## 2021-07-30 PROCEDURE — 99285 EMERGENCY DEPT VISIT HI MDM: CPT

## 2021-07-30 PROCEDURE — P9612 CATHETERIZE FOR URINE SPEC: HCPCS

## 2021-07-30 PROCEDURE — 80053 COMPREHEN METABOLIC PANEL: CPT

## 2021-07-30 RX ORDER — SODIUM CHLORIDE 0.9 % (FLUSH) 0.9 %
10 SYRINGE (ML) INJECTION AS NEEDED
Status: DISCONTINUED | OUTPATIENT
Start: 2021-07-30 | End: 2021-08-01 | Stop reason: HOSPADM

## 2021-07-31 ENCOUNTER — APPOINTMENT (OUTPATIENT)
Dept: GENERAL RADIOLOGY | Facility: HOSPITAL | Age: 76
End: 2021-07-31

## 2021-07-31 ENCOUNTER — HOSPITAL ENCOUNTER (INPATIENT)
Facility: HOSPITAL | Age: 76
LOS: 1 days | Discharge: HOME | End: 2021-08-01
Attending: EMERGENCY MEDICINE | Admitting: FAMILY MEDICINE

## 2021-07-31 DIAGNOSIS — A41.9 ACUTE SEPSIS (HCC): Primary | ICD-10-CM

## 2021-07-31 DIAGNOSIS — J18.9 PNEUMONIA DUE TO INFECTIOUS ORGANISM, UNSPECIFIED LATERALITY, UNSPECIFIED PART OF LUNG: ICD-10-CM

## 2021-07-31 DIAGNOSIS — I48.91 ATRIAL FIBRILLATION WITH RAPID VENTRICULAR RESPONSE (HCC): ICD-10-CM

## 2021-07-31 PROBLEM — E80.6 HYPERBILIRUBINEMIA: Status: ACTIVE | Noted: 2021-07-31

## 2021-07-31 LAB
ANION GAP SERPL CALCULATED.3IONS-SCNC: 12 MMOL/L (ref 5–15)
BACTERIA UR QL AUTO: ABNORMAL /HPF
BASOPHILS # BLD AUTO: 0.04 10*3/MM3 (ref 0–0.2)
BASOPHILS NFR BLD AUTO: 0.2 % (ref 0–1.5)
BILIRUB CONJ SERPL-MCNC: 0.6 MG/DL (ref 0–0.3)
BILIRUB UR QL STRIP: NEGATIVE
BUN SERPL-MCNC: 12 MG/DL (ref 8–23)
BUN/CREAT SERPL: 12.6 (ref 7–25)
CALCIUM SPEC-SCNC: 9 MG/DL (ref 8.6–10.5)
CHLORIDE SERPL-SCNC: 93 MMOL/L (ref 98–107)
CLARITY UR: CLEAR
CO2 SERPL-SCNC: 29 MMOL/L (ref 22–29)
COLOR UR: ABNORMAL
CREAT SERPL-MCNC: 0.95 MG/DL (ref 0.76–1.27)
D-LACTATE SERPL-SCNC: 1.2 MMOL/L (ref 0.5–2)
DEPRECATED RDW RBC AUTO: 47.5 FL (ref 37–54)
EOSINOPHIL # BLD AUTO: 0.14 10*3/MM3 (ref 0–0.4)
EOSINOPHIL NFR BLD AUTO: 0.9 % (ref 0.3–6.2)
ERYTHROCYTE [DISTWIDTH] IN BLOOD BY AUTOMATED COUNT: 13.2 % (ref 12.3–15.4)
FLUAV RNA RESP QL NAA+PROBE: NOT DETECTED
FLUBV RNA RESP QL NAA+PROBE: NOT DETECTED
GFR SERPL CREATININE-BSD FRML MDRD: 77 ML/MIN/1.73
GLUCOSE SERPL-MCNC: 123 MG/DL (ref 65–99)
GLUCOSE UR STRIP-MCNC: NEGATIVE MG/DL
HCT VFR BLD AUTO: 46.3 % (ref 37.5–51)
HGB BLD-MCNC: 16.4 G/DL (ref 13–17.7)
HGB UR QL STRIP.AUTO: ABNORMAL
HOLD SPECIMEN: NORMAL
HYALINE CASTS UR QL AUTO: ABNORMAL /LPF
IMM GRANULOCYTES # BLD AUTO: 0.15 10*3/MM3 (ref 0–0.05)
IMM GRANULOCYTES NFR BLD AUTO: 0.9 % (ref 0–0.5)
KETONES UR QL STRIP: ABNORMAL
LEUKOCYTE ESTERASE UR QL STRIP.AUTO: ABNORMAL
LYMPHOCYTES # BLD AUTO: 0.63 10*3/MM3 (ref 0.7–3.1)
LYMPHOCYTES NFR BLD AUTO: 3.9 % (ref 19.6–45.3)
MCH RBC QN AUTO: 34.7 PG (ref 26.6–33)
MCHC RBC AUTO-ENTMCNC: 35.4 G/DL (ref 31.5–35.7)
MCV RBC AUTO: 97.9 FL (ref 79–97)
MONOCYTES # BLD AUTO: 1.07 10*3/MM3 (ref 0.1–0.9)
MONOCYTES NFR BLD AUTO: 6.6 % (ref 5–12)
NEUTROPHILS NFR BLD AUTO: 14.24 10*3/MM3 (ref 1.7–7)
NEUTROPHILS NFR BLD AUTO: 87.5 % (ref 42.7–76)
NITRITE UR QL STRIP: NEGATIVE
NRBC BLD AUTO-RTO: 0 /100 WBC (ref 0–0.2)
PH UR STRIP.AUTO: 5.5 [PH] (ref 5–8)
PLAT MORPH BLD: NORMAL
PLATELET # BLD AUTO: 185 10*3/MM3 (ref 140–450)
PMV BLD AUTO: 10.1 FL (ref 6–12)
POTASSIUM SERPL-SCNC: 3.5 MMOL/L (ref 3.5–5.2)
PROT UR QL STRIP: NEGATIVE
QT INTERVAL: 350 MS
QTC INTERVAL: 482 MS
RBC # BLD AUTO: 4.73 10*6/MM3 (ref 4.14–5.8)
RBC # UR: ABNORMAL /HPF
RBC MORPH BLD: NORMAL
REF LAB TEST METHOD: ABNORMAL
SARS-COV-2 RNA RESP QL NAA+PROBE: NOT DETECTED
SODIUM SERPL-SCNC: 134 MMOL/L (ref 136–145)
SP GR UR STRIP: 1.02 (ref 1–1.03)
SQUAMOUS #/AREA URNS HPF: ABNORMAL /HPF
UROBILINOGEN UR QL STRIP: ABNORMAL
WBC # BLD AUTO: 16.27 10*3/MM3 (ref 3.4–10.8)
WBC MORPH BLD: NORMAL
WBC UR QL AUTO: ABNORMAL /HPF

## 2021-07-31 PROCEDURE — 85007 BL SMEAR W/DIFF WBC COUNT: CPT | Performed by: NURSE PRACTITIONER

## 2021-07-31 PROCEDURE — 87636 SARSCOV2 & INF A&B AMP PRB: CPT | Performed by: EMERGENCY MEDICINE

## 2021-07-31 PROCEDURE — 93005 ELECTROCARDIOGRAM TRACING: CPT | Performed by: EMERGENCY MEDICINE

## 2021-07-31 PROCEDURE — 99223 1ST HOSP IP/OBS HIGH 75: CPT | Performed by: INTERNAL MEDICINE

## 2021-07-31 PROCEDURE — 83605 ASSAY OF LACTIC ACID: CPT | Performed by: EMERGENCY MEDICINE

## 2021-07-31 PROCEDURE — 80048 BASIC METABOLIC PNL TOTAL CA: CPT | Performed by: NURSE PRACTITIONER

## 2021-07-31 PROCEDURE — 81001 URINALYSIS AUTO W/SCOPE: CPT | Performed by: EMERGENCY MEDICINE

## 2021-07-31 PROCEDURE — 82248 BILIRUBIN DIRECT: CPT | Performed by: INTERNAL MEDICINE

## 2021-07-31 PROCEDURE — 87040 BLOOD CULTURE FOR BACTERIA: CPT | Performed by: EMERGENCY MEDICINE

## 2021-07-31 PROCEDURE — 85025 COMPLETE CBC W/AUTO DIFF WBC: CPT | Performed by: NURSE PRACTITIONER

## 2021-07-31 PROCEDURE — 71046 X-RAY EXAM CHEST 2 VIEWS: CPT

## 2021-07-31 PROCEDURE — 25010000003 CEFTRIAXONE PER 250 MG: Performed by: EMERGENCY MEDICINE

## 2021-07-31 RX ORDER — ACETAMINOPHEN 325 MG/1
650 TABLET ORAL EVERY 4 HOURS PRN
Status: DISCONTINUED | OUTPATIENT
Start: 2021-07-31 | End: 2021-08-01 | Stop reason: HOSPADM

## 2021-07-31 RX ORDER — TORSEMIDE 20 MG/1
20 TABLET ORAL DAILY
Status: DISCONTINUED | OUTPATIENT
Start: 2021-07-31 | End: 2021-07-31

## 2021-07-31 RX ORDER — IPRATROPIUM BROMIDE AND ALBUTEROL SULFATE 2.5; .5 MG/3ML; MG/3ML
3 SOLUTION RESPIRATORY (INHALATION) EVERY 4 HOURS PRN
Status: DISCONTINUED | OUTPATIENT
Start: 2021-07-31 | End: 2021-08-01 | Stop reason: HOSPADM

## 2021-07-31 RX ORDER — SODIUM CHLORIDE 0.9 % (FLUSH) 0.9 %
10 SYRINGE (ML) INJECTION AS NEEDED
Status: DISCONTINUED | OUTPATIENT
Start: 2021-07-31 | End: 2021-08-01 | Stop reason: HOSPADM

## 2021-07-31 RX ORDER — ACETAMINOPHEN 650 MG/1
650 SUPPOSITORY RECTAL EVERY 4 HOURS PRN
Status: DISCONTINUED | OUTPATIENT
Start: 2021-07-31 | End: 2021-08-01 | Stop reason: HOSPADM

## 2021-07-31 RX ORDER — ACETAMINOPHEN 160 MG/5ML
650 SOLUTION ORAL EVERY 4 HOURS PRN
Status: DISCONTINUED | OUTPATIENT
Start: 2021-07-31 | End: 2021-08-01 | Stop reason: HOSPADM

## 2021-07-31 RX ORDER — CEFTRIAXONE SODIUM 2 G/50ML
2 INJECTION, SOLUTION INTRAVENOUS ONCE
Status: COMPLETED | OUTPATIENT
Start: 2021-07-31 | End: 2021-07-31

## 2021-07-31 RX ORDER — ACETAMINOPHEN 325 MG/1
650 TABLET ORAL ONCE
Status: COMPLETED | OUTPATIENT
Start: 2021-07-31 | End: 2021-07-31

## 2021-07-31 RX ORDER — CEFTRIAXONE SODIUM 1 G/50ML
1 INJECTION, SOLUTION INTRAVENOUS EVERY 24 HOURS
Status: DISCONTINUED | OUTPATIENT
Start: 2021-08-01 | End: 2021-08-01 | Stop reason: HOSPADM

## 2021-07-31 RX ORDER — CHOLECALCIFEROL (VITAMIN D3) 125 MCG
5 CAPSULE ORAL NIGHTLY PRN
Status: DISCONTINUED | OUTPATIENT
Start: 2021-07-31 | End: 2021-08-01 | Stop reason: HOSPADM

## 2021-07-31 RX ORDER — SODIUM CHLORIDE 0.9 % (FLUSH) 0.9 %
10 SYRINGE (ML) INJECTION EVERY 12 HOURS SCHEDULED
Status: DISCONTINUED | OUTPATIENT
Start: 2021-07-31 | End: 2021-08-01 | Stop reason: HOSPADM

## 2021-07-31 RX ORDER — IPRATROPIUM BROMIDE AND ALBUTEROL SULFATE 2.5; .5 MG/3ML; MG/3ML
3 SOLUTION RESPIRATORY (INHALATION)
Status: DISCONTINUED | OUTPATIENT
Start: 2021-07-31 | End: 2021-08-01 | Stop reason: HOSPADM

## 2021-07-31 RX ORDER — SODIUM CHLORIDE 9 MG/ML
75 INJECTION, SOLUTION INTRAVENOUS CONTINUOUS
Status: ACTIVE | OUTPATIENT
Start: 2021-07-31 | End: 2021-07-31

## 2021-07-31 RX ADMIN — DOXYCYCLINE 100 MG: 100 INJECTION, POWDER, LYOPHILIZED, FOR SOLUTION INTRAVENOUS at 03:28

## 2021-07-31 RX ADMIN — SODIUM CHLORIDE, PRESERVATIVE FREE 10 ML: 5 INJECTION INTRAVENOUS at 21:17

## 2021-07-31 RX ADMIN — RIVAROXABAN 20 MG: 20 TABLET, FILM COATED ORAL at 16:56

## 2021-07-31 RX ADMIN — METOPROLOL TARTRATE 12.5 MG: 25 TABLET, FILM COATED ORAL at 12:42

## 2021-07-31 RX ADMIN — SERTRALINE HYDROCHLORIDE 50 MG: 50 TABLET ORAL at 12:42

## 2021-07-31 RX ADMIN — METOPROLOL TARTRATE 12.5 MG: 25 TABLET, FILM COATED ORAL at 21:16

## 2021-07-31 RX ADMIN — DOXYCYCLINE 100 MG: 100 INJECTION, POWDER, LYOPHILIZED, FOR SOLUTION INTRAVENOUS at 16:57

## 2021-07-31 RX ADMIN — SODIUM CHLORIDE, POTASSIUM CHLORIDE, SODIUM LACTATE AND CALCIUM CHLORIDE 500 ML: 600; 310; 30; 20 INJECTION, SOLUTION INTRAVENOUS at 05:12

## 2021-07-31 RX ADMIN — ACETAMINOPHEN 650 MG: 325 TABLET ORAL at 05:10

## 2021-07-31 RX ADMIN — SODIUM CHLORIDE, PRESERVATIVE FREE 10 ML: 5 INJECTION INTRAVENOUS at 12:43

## 2021-07-31 RX ADMIN — SODIUM CHLORIDE 75 ML/HR: 9 INJECTION, SOLUTION INTRAVENOUS at 08:00

## 2021-07-31 RX ADMIN — SODIUM CHLORIDE, POTASSIUM CHLORIDE, SODIUM LACTATE AND CALCIUM CHLORIDE 1000 ML: 600; 310; 30; 20 INJECTION, SOLUTION INTRAVENOUS at 03:28

## 2021-07-31 RX ADMIN — SODIUM CHLORIDE 75 ML/HR: 9 INJECTION, SOLUTION INTRAVENOUS at 12:42

## 2021-07-31 RX ADMIN — CEFTRIAXONE SODIUM 2 G: 2 INJECTION, SOLUTION INTRAVENOUS at 05:11

## 2021-08-01 VITALS
OXYGEN SATURATION: 95 % | HEIGHT: 75 IN | BODY MASS INDEX: 32.83 KG/M2 | DIASTOLIC BLOOD PRESSURE: 86 MMHG | WEIGHT: 264 LBS | SYSTOLIC BLOOD PRESSURE: 135 MMHG | RESPIRATION RATE: 18 BRPM | HEART RATE: 84 BPM | TEMPERATURE: 96.7 F

## 2021-08-01 PROBLEM — A41.9 SEPSIS: Status: RESOLVED | Noted: 2021-07-31 | Resolved: 2021-08-01

## 2021-08-01 LAB
ALBUMIN SERPL-MCNC: 3 G/DL (ref 3.5–5.2)
ALBUMIN/GLOB SERPL: 1.1 G/DL
ALP SERPL-CCNC: 66 U/L (ref 39–117)
ALT SERPL W P-5'-P-CCNC: 22 U/L (ref 1–41)
ANION GAP SERPL CALCULATED.3IONS-SCNC: 5 MMOL/L (ref 5–15)
AST SERPL-CCNC: 36 U/L (ref 1–40)
BILIRUB SERPL-MCNC: 1.4 MG/DL (ref 0–1.2)
BUN SERPL-MCNC: 12 MG/DL (ref 8–23)
BUN/CREAT SERPL: 15.2 (ref 7–25)
CALCIUM SPEC-SCNC: 8.7 MG/DL (ref 8.6–10.5)
CHLORIDE SERPL-SCNC: 100 MMOL/L (ref 98–107)
CO2 SERPL-SCNC: 29 MMOL/L (ref 22–29)
CREAT SERPL-MCNC: 0.79 MG/DL (ref 0.76–1.27)
DEPRECATED RDW RBC AUTO: 48.4 FL (ref 37–54)
ERYTHROCYTE [DISTWIDTH] IN BLOOD BY AUTOMATED COUNT: 13.3 % (ref 12.3–15.4)
GFR SERPL CREATININE-BSD FRML MDRD: 96 ML/MIN/1.73
GLOBULIN UR ELPH-MCNC: 2.8 GM/DL
GLUCOSE SERPL-MCNC: 101 MG/DL (ref 65–99)
HCT VFR BLD AUTO: 39.7 % (ref 37.5–51)
HGB BLD-MCNC: 13.4 G/DL (ref 13–17.7)
MCH RBC QN AUTO: 33.4 PG (ref 26.6–33)
MCHC RBC AUTO-ENTMCNC: 33.8 G/DL (ref 31.5–35.7)
MCV RBC AUTO: 99 FL (ref 79–97)
PLATELET # BLD AUTO: 154 10*3/MM3 (ref 140–450)
PMV BLD AUTO: 9.9 FL (ref 6–12)
POTASSIUM SERPL-SCNC: 4 MMOL/L (ref 3.5–5.2)
PROT SERPL-MCNC: 5.8 G/DL (ref 6–8.5)
RBC # BLD AUTO: 4.01 10*6/MM3 (ref 4.14–5.8)
SODIUM SERPL-SCNC: 134 MMOL/L (ref 136–145)
WBC # BLD AUTO: 7.06 10*3/MM3 (ref 3.4–10.8)

## 2021-08-01 PROCEDURE — 99239 HOSP IP/OBS DSCHRG MGMT >30: CPT | Performed by: FAMILY MEDICINE

## 2021-08-01 PROCEDURE — 85027 COMPLETE CBC AUTOMATED: CPT | Performed by: INTERNAL MEDICINE

## 2021-08-01 PROCEDURE — 25010000002 CEFTRIAXONE PER 250 MG: Performed by: NURSE PRACTITIONER

## 2021-08-01 PROCEDURE — 80053 COMPREHEN METABOLIC PANEL: CPT | Performed by: INTERNAL MEDICINE

## 2021-08-01 RX ORDER — DOXYCYCLINE HYCLATE 100 MG/1
100 CAPSULE ORAL 2 TIMES DAILY
Qty: 10 CAPSULE | Refills: 0 | Status: SHIPPED | OUTPATIENT
Start: 2021-08-01 | End: 2021-08-06

## 2021-08-01 RX ORDER — SACCHAROMYCES BOULARDII 250 MG
250 CAPSULE ORAL 2 TIMES DAILY
Qty: 10 CAPSULE | Refills: 0 | Status: SHIPPED | OUTPATIENT
Start: 2021-08-01 | End: 2021-08-06

## 2021-08-01 RX ORDER — CEFDINIR 300 MG/1
300 CAPSULE ORAL 2 TIMES DAILY
Qty: 10 CAPSULE | Refills: 0 | Status: SHIPPED | OUTPATIENT
Start: 2021-08-01 | End: 2021-08-06

## 2021-08-01 RX ADMIN — SODIUM CHLORIDE, PRESERVATIVE FREE 10 ML: 5 INJECTION INTRAVENOUS at 08:56

## 2021-08-01 RX ADMIN — SERTRALINE HYDROCHLORIDE 50 MG: 50 TABLET ORAL at 08:56

## 2021-08-01 RX ADMIN — METOPROLOL TARTRATE 12.5 MG: 25 TABLET, FILM COATED ORAL at 08:56

## 2021-08-01 RX ADMIN — DOXYCYCLINE 100 MG: 100 INJECTION, POWDER, LYOPHILIZED, FOR SOLUTION INTRAVENOUS at 06:13

## 2021-08-01 RX ADMIN — CEFTRIAXONE SODIUM 1 G: 1 INJECTION, SOLUTION INTRAVENOUS at 05:30

## 2021-08-01 NOTE — DISCHARGE SUMMARY
[facility] Medicine Services  DISCHARGE SUMMARY    Patient Name: Vlad Arellano  : 1945  MRN: [id number]    Date of Admission: 2021  1:50 AM  Date of Discharge:  2021  Primary Care Physician: Saleem Yang MD    Consults     No orders found for last 30 day(s).          Hospital Course     Presenting Problem:   Pneumonia [J18.9]    Active Hospital Problems    Diagnosis  POA   • Pneumonia [J18.9]  Yes   • A-fib (CMS/HCC) [I48.91]  Yes   • Hyperbilirubinemia [E80.6]  Yes   • Essential hypertension [I10]  Yes   • PARKER (obstructive sleep apnea) [G47.33]  Yes      Resolved Hospital Problems    Diagnosis Date Resolved POA   • **Sepsis (CMS/HCC) [A41.9] 2021 Yes          Hospital Course:  Vlad Arellano is a 76 y.o. male with a history of hypertension, PARKER on CPAP, PAF on Xarelto, tachybradycardia syndrome, presents to the ED with complaints of shaking all over.     Sepsis: resolved  Pneumonia  -patient doing great today, on room air. Hasn't required O2 since admission, wears his home CPAP at night  -Leukocytosis has resolved  -Will transition to oral ABX, Doxy and Omnicef for 5 days with Probiotic   -Recommend PCP to repeat CXR in 4-6 weeks     Hyperbilirubinemia  --Trending down, suspect secondary to sepsis     A. fib   Hypertension  --Continue Xarelto   --Continue Demadex  --Continue Metoprolol 12.5mg BID, HR controlled  --Recommend to follow-up with Dr Kaur in 1-2 weeks      PARKER  --CPAP    Discharge Follow Up Recommendations for outpatient labs/diagnostics:  -PCP 3-5 days with repeat CXR in 4-6 weeks  -Dr Kaur in 1-2 weeks     Day of Discharge     HPI:   Patient seen and examined. RN notes reviewed. No acute events overnight. Patient feels well, eager for DC home .    Review of Systems  Gen- No fevers, chills  CV- No chest pain, palpitations  Resp- No cough, dyspnea  GI- No N/V/D, abd pain    Vital Signs:   Temp:  [96.7 °F (35.9 °C)-98.1 °F  (36.7 °C)] 96.7 °F (35.9 °C)  Heart Rate:  [] 92  Resp:  [16-18] 18  BP: (123-159)/() 135/86     Physical Exam:  Constitutional: No acute distress, awake, alert  HENT: NCAT, mucous membranes moist  Respiratory: Clear to auscultation bilaterally, respiratory effort normal   Cardiovascular: Rate controlled A fib, no murmurs, rubs, or gallops  Gastrointestinal: Positive bowel sounds, soft, nontender, nondistended  Musculoskeletal: No bilateral ankle edema  Psychiatric: Appropriate affect, cooperative  Neurologic: Oriented x 3, strength symmetric in all extremities, Cranial Nerves grossly intact to confrontation, speech clear  Skin: No rashes    Pertinent  and/or Most Recent Results     LAB RESULTS:      Lab 08/01/21  0554 07/31/21  0520 07/31/21 0327 07/30/21 2054   WBC 7.06 16.27*  --  12.11*   HEMOGLOBIN 13.4 16.4  --  15.4   HEMATOCRIT 39.7 46.3  --  45.0   PLATELETS 154 185  --  193   NEUTROS ABS  --  14.24*  --  9.96*   IMMATURE GRANS (ABS)  --  0.15*  --  0.04   LYMPHS ABS  --  0.63*  --  1.15   MONOS ABS  --  1.07*  --  0.80   EOS ABS  --  0.14  --  0.12   MCV 99.0* 97.9*  --  97.6*   LACTATE  --   --  1.2  --          Lab 08/01/21  0554 07/31/21  0608 07/30/21 2054   SODIUM 134* 134* 138   POTASSIUM 4.0 3.5 3.7   CHLORIDE 100 93* 97*   CO2 29.0 29.0 29.0   ANION GAP 5.0 12.0 12.0   BUN 12 12 13   CREATININE 0.79 0.95 1.01   GLUCOSE 101* 123* 170*   CALCIUM 8.7 9.0 9.0         Lab 08/01/21  0554 07/31/21  0520 07/30/21 2054   TOTAL PROTEIN 5.8*  --  6.8   ALBUMIN 3.00*  --  3.90   GLOBULIN 2.8  --  2.9   ALT (SGPT) 22  --  14   AST (SGOT) 36  --  27   BILIRUBIN 1.4*  --  1.6*   BILIRUBIN DIRECT  --  0.6*  --    ALK PHOS 66  --  80                     Brief Urine Lab Results  (Last result in the past 365 days)      Color   Clarity   Blood   Leuk Est   Nitrite   Protein   CREAT   Urine HCG        07/31/21 0250 Dark Yellow Clear Small (1+) Trace Negative Negative             Microbiology Results  (last 10 days)     Procedure Component Value - Date/Time    Blood Culture - Blood, Arm, Left [[id number]] Collected: 07/31/21 0327    Lab Status: Preliminary result Specimen: Blood from Arm, Left Updated: 08/01/21 0716     Blood Culture No growth at 24 hours    Blood Culture - Blood, Arm, Right [[id number]] Collected: 07/31/21 0327    Lab Status: Preliminary result Specimen: Blood from Arm, Right Updated: 08/01/21 0716     Blood Culture No growth at 24 hours    COVID PRE-OP / PRE-PROCEDURE SCREENING ORDER (NO ISOLATION) - Swab, Nasopharynx [[id number]]  (Normal) Collected: 07/31/21 0324    Lab Status: Final result Specimen: Swab from Nasopharynx Updated: 07/31/21 0625    Narrative:      The following orders were created for panel order COVID PRE-OP / PRE-PROCEDURE SCREENING ORDER (NO ISOLATION) - Swab, Nasopharynx.  Procedure                               Abnormality         Status                     ---------                               -----------         ------                     COVID-19 and FLU A/B PCR...[[id number]]  Normal              Final result                 Please view results for these tests on the individual orders.    COVID-19 and FLU A/B PCR - Swab, Nasopharynx [[id number]]  (Normal) Collected: 07/31/21 0324    Lab Status: Final result Specimen: Swab from Nasopharynx Updated: 07/31/21 0625     COVID19 Not Detected     Influenza A PCR Not Detected     Influenza B PCR Not Detected    Narrative:      Fact sheet for providers: https://www.fda.gov/media/033133/download    Fact sheet for patients: https://www.fda.gov/media/039438/download    Test performed by PCR.          XR Chest 2 View    Result Date: 7/31/2021  CR Chest 2 Vws INDICATION:  Cough. Emergency department patient COMPARISON:  1/18/2017 FINDINGS: PA and lateral views of the chest.  Cardiac silhouette is borderline in size and stable. Chronic atelectasis/scarring in the upper lung zone on the left. New interstitial and faint alveolar  opacities are present in the mid lung on the right indicative of atelectasis or pneumonia. Follow-up to clearing recommended. No effusion or pneumothorax.      1. New asymmetric hazy opacities in the right lung either representing atelectasis or pneumonia. Follow-up to clearing recommended. Signer Name: Demarcus Wallace MD  Signed: 7/31/2021 2:06 AM  Workstation Name: [facility]  [facility]                  Plan for Follow-up of Pending Labs/Results: PCP  Pending Labs     Order Current Status    Blood Culture - Blood, Arm, Left Preliminary result    Blood Culture - Blood, Arm, Right Preliminary result        Discharge Details        Discharge Medications      New Medications      Instructions Start Date   cefdinir 300 MG capsule  Commonly known as: OMNICEF   300 mg, Oral, 2 Times Daily      doxycycline 100 MG capsule  Commonly known as: VIBRAMYCIN   100 mg, Oral, 2 Times Daily      metoprolol tartrate 25 MG tablet  Commonly known as: LOPRESSOR   12.5 mg, Oral, Every 12 Hours Scheduled      saccharomyces boulardii 250 MG capsule  Commonly known as: Florastor   250 mg, Oral, 2 Times Daily         Continue These Medications      Instructions Start Date   rivaroxaban 20 MG tablet  Commonly known as: XARELTO   20 mg, Oral, Daily      sertraline 50 MG tablet  Commonly known as: ZOLOFT   50 mg, Oral, Daily      torsemide 20 MG tablet  Commonly known as: DEMADEX   20 mg, Oral, Daily             Allergies   Allergen Reactions   • Naproxen      Patient states he has made changes on that         Discharge Disposition:  Home or Self Care    Diet:  Hospital:  Diet Order   Procedures   • Diet Regular; Cardiac       Activity:      Restrictions or Other Recommendations:       CODE STATUS:    Code Status and Medical Interventions:   Ordered at: 07/31/21 0555     Level Of Support Discussed With:    Patient     Code Status:    CPR     Medical Interventions (Level of Support Prior to Arrest):    Full        Future Appointments   Date Time Provider Department [place]   10/13/2021  2:00 PM Namrata Kaur MD [facility] [place]       Additional Instructions for the Follow-ups that You Need to Schedule     Discharge Follow-up with PCP   As directed       Currently Documented PCP:    Saleem Yang MD    PCP Phone Number:    [phone]     Follow Up Details: 3-5 days         Discharge Follow-up with Specified Provider: Dr Kaur; 2 Weeks   As directed      To: Dr Kaur    Follow Up: 2 Weeks                     Julia Butterfield DO  08/01/21      Time Spent on Discharge:  I spent  45  minutes on this discharge activity which included: face-to-face encounter with the patient, reviewing the data in the system, coordination of the care with the nursing staff as well as consultants, documentation, and entering orders.

## 2021-08-02 ENCOUNTER — TELEPHONE (OUTPATIENT)
Dept: CARDIOLOGY | Facility: CLINIC | Age: 76
End: 2021-08-02

## 2021-08-02 NOTE — TELEPHONE ENCOUNTER
PT was in the ER 7/31/21- diagnosed with pneumonia- pt in chronic afib with  in the ER- he was started on Metoprolol 12.5 mg BID in the ER and instructed to follow up with our office.     He states that he feels fine cardiac wise- 's/70's HR 70-85. Tolerating metoprolol fine- he did not understand why he needed to f/u with for chronic afib-     He will finish his antibiotics this week and will f/u with me next week if he has any other issues- if not, we will see him as scheduled in October

## 2021-08-05 LAB
BACTERIA SPEC AEROBE CULT: NORMAL
BACTERIA SPEC AEROBE CULT: NORMAL

## 2021-10-12 NOTE — PROGRESS NOTES
[facility] Cardiology    Patient ID: Vlad Arellano is a 76 y.o. male.  : 1945   Contact: [phone]    Encounter date: 10/13/2021    PCP: Saleem Yang MD      Chief complaint:   Chief Complaint   Patient presents with   • Longstanding persistent atrial fibrillation     Problem List:  1. Chronic atrial fibrillation  a. Diagnosed 4 years ago with external cardioversion x3.  b. CHADS-Vasc = 2 (HTN, Age).  On Xarelto.  c. Failed multaq and Sotalol  d. Asymptomatic  e. Opted for rate-control but has not required AV nodals   2. Tachy-krishna syndrome.   3. Hypertension.   4. Lower extremity edema.   5. PARKER requiring CPAP.   6. Osteoarthritis.   7. Mild obesity.   8. Family history of hypertension.   9. Right knee subcutaneous hematoma/seroma with skin necrosis. S/P debridement and partial closure. Following with IV antibiotics, wound care.  10. Surgical history:   a. External cardioversion x3.  b. Lithotripsy.    Allergies   Allergen Reactions   • Naproxen      Patient states he has made changes on that       Current Medications:    Current Outpatient Medications:   •  rivaroxaban (XARELTO) 20 MG tablet, Take 1 tablet by mouth Daily., Disp: 30 tablet, Rfl: 3  •  sertraline (ZOLOFT) 50 MG tablet, Take 50 mg by mouth Daily., Disp: , Rfl:   •  torsemide (DEMADEX) 20 MG tablet, Take 1 tablet by mouth Daily., Disp: 90 tablet, Rfl: 3    HPI    Vlad Arellano is a 76 y.o. male who presents today for an annual follow up of persistent atrial fibrillation, tachy-krishna syndrome, and cardiac risk factors. Since last visit, patient has done well overall. He denies shortness of breath, PND, orthopnea, palpitations. BP controlled. HR 70-90's. -120's/60'sper his account.        The following portions of the patient's history were reviewed and updated as appropriate: allergies, current medications and problem list.    Pertinent positives as listed in the HPI.  All other systems  "reviewed are negative.         Vitals:    10/13/21 1356   BP: 132/62   BP Location: Left arm   Patient Position: Sitting   Pulse: 96   SpO2: 99%   Weight: 114 kg (252 lb)   Height: 190.5 cm (75\")       Physical Exam:  General: Alert and oriented.  Neck: Jugular venous pressure is within normal limits. Carotids have normal upstrokes without bruits.   Cardiovascular: Heart has a nondisplaced focal PMI. Irregularly irregular rhythm, regular rate. No murmur, gallop or rub.  Lungs: Clear, no rales or wheezes. Equal expansion is noted.   Extremities: Show no edema.  Skin: Warm and dry.  Neurologic: Nonfocal.     Diagnostic Data (reviewed with patient):  Lab Results   Component Value Date    GLUCOSE 101 (H) 08/01/2021    BUN 12 08/01/2021    CREATININE 0.79 08/01/2021    EGFRIFNONA 96 08/01/2021    BCR 15.2 08/01/2021     (L) 08/01/2021    K 4.0 08/01/2021     08/01/2021    CO2 29.0 08/01/2021    CALCIUM 8.7 08/01/2021    ALBUMIN 3.00 (L) 08/01/2021    ALKPHOS 66 08/01/2021    AST 36 08/01/2021    ALT 22 08/01/2021     Lab Results   Component Value Date    WBC 7.06 08/01/2021    RBC 4.01 (L) 08/01/2021    HGB 13.4 08/01/2021    HCT 39.7 08/01/2021    MCV 99.0 (H) 08/01/2021     08/01/2021        Procedures      Assessment:    ICD-10-CM ICD-9-CM   1. Longstanding persistent atrial fibrillation (HCC)  I48.11 427.31   2. Tachy-krishna syndrome (HCC)  I49.5 427.81   3. Essential hypertension  I10 401.9         Plan:  1. Continue on Xarelto 20 mg daily for stroke prophylaxis.   2. Continue on torsemide 20 mg daily for hypertension.   3. Continue all other current medications.  4. F/up in 12 months, sooner if needed.      Electronically signed by LUCAS Thakur, 10/13/21, 2:12 PM EDT.              "

## 2021-10-13 ENCOUNTER — OFFICE VISIT (OUTPATIENT)
Dept: CARDIOLOGY | Facility: CLINIC | Age: 76
End: 2021-10-13

## 2021-10-13 VITALS
WEIGHT: 252 LBS | BODY MASS INDEX: 31.33 KG/M2 | SYSTOLIC BLOOD PRESSURE: 132 MMHG | HEIGHT: 75 IN | OXYGEN SATURATION: 99 % | HEART RATE: 96 BPM | DIASTOLIC BLOOD PRESSURE: 62 MMHG

## 2021-10-13 DIAGNOSIS — I10 ESSENTIAL HYPERTENSION: ICD-10-CM

## 2021-10-13 DIAGNOSIS — I49.5 TACHY-BRADY SYNDROME (HCC): ICD-10-CM

## 2021-10-13 DIAGNOSIS — I48.11 LONGSTANDING PERSISTENT ATRIAL FIBRILLATION (HCC): Primary | ICD-10-CM

## 2021-10-13 PROCEDURE — 99213 OFFICE O/P EST LOW 20 MIN: CPT | Performed by: INTERNAL MEDICINE

## 2021-10-13 RX ORDER — TORSEMIDE 20 MG/1
20 TABLET ORAL DAILY
Qty: 90 TABLET | Refills: 3 | Status: SHIPPED | OUTPATIENT
Start: 2021-10-13 | End: 2022-11-07

## 2021-10-20 ENCOUNTER — OFFICE VISIT (OUTPATIENT)
Dept: SLEEP MEDICINE | Facility: HOSPITAL | Age: 76
End: 2021-10-20

## 2021-10-20 VITALS
DIASTOLIC BLOOD PRESSURE: 86 MMHG | WEIGHT: 251.6 LBS | HEART RATE: 92 BPM | OXYGEN SATURATION: 95 % | HEIGHT: 75 IN | BODY MASS INDEX: 31.28 KG/M2 | SYSTOLIC BLOOD PRESSURE: 146 MMHG

## 2021-10-20 DIAGNOSIS — R06.83 SNORING: Primary | ICD-10-CM

## 2021-10-20 DIAGNOSIS — I48.19 ATRIAL FIBRILLATION, PERSISTENT (HCC): ICD-10-CM

## 2021-10-20 DIAGNOSIS — G47.33 OBSTRUCTIVE SLEEP APNEA, ADULT: ICD-10-CM

## 2021-10-20 PROCEDURE — 99203 OFFICE O/P NEW LOW 30 MIN: CPT | Performed by: INTERNAL MEDICINE

## 2021-10-27 NOTE — PROGRESS NOTES
Chief Complaint  Snoring, Leg/body Jerks During Sleep, and Sleeping Problem    Subjective        Vlad Arellano presents to [facility] for the evaluation of snoring and obstructive sleep apnea.  His primary care physician is Dr. Saleem Yang.  He was formerly seen by Dr. Loya.  He is seen in person sleep clinic.  History of Present Illness  Patient has a history of snoring noted for at least 23 years.  He denies having noted apneas prior to having his sleep study in 2012.  He was diagnosed then with mild obstructive sleep apnea.  He has been on CPAP since then.  He says he uses it nightly.  He occasionally has slight snoring with the CPAP on but that is when he is having a mask issue.  He uses a fullface mask.    When he uses his CPAP mask he is usually rested.  He denies having morning headaches.  If he falls asleep without his mask he still has snoring he says he generally is not sleepy during the day.  He denies having problems driving.  He occasionally has kicking of his legs at night but does not think it keeps him awake.  He denies having any chronic pain at night.    He goes to bed about 10 PM.  He will fall asleep in 20 minutes.  He awakens twice during the night.  He thinks he gets 7 to 8 hours of sleep and says he feels rested with his machine.  He denies any history of hypertension or diabetes.  He does have a history of atrial fibrillation.    Habits: Tobacco: He chews a fourth package tobacco per day    Alcohol: He has 3-4 beers per day    Caffeine: He has 3 mario alberto per day.    Allergies: He is allergic to naproxen    Medical history: Arthritis and atrial fibrillation    Surgical history: Hernia repair    Family history: Unremarkable    Medications: Torsemide, Xarelto, sertraline.    Review of systems: Positives include hearing loss, eye itching, apneas, neck pain, neck stiffness, and bruising easily.  Other systems were negative.    [place] score is  "2/24  Objective   Vital Signs:   /86 (BP Location: Left arm, Patient Position: Sitting, Cuff Size: Adult)   Pulse 92   Ht 190.5 cm (75\")   Wt 114 kg (251 lb 9.6 oz)   SpO2 95%   BMI 31.45 kg/m²     Physical Exam   Patient appears to be awake and alert.  He is not appear to be in acute respiratory distress.    He is normocephalic he has Mallampati class IV anatomy.    Lungs are clear.    Cardiac exam revealed as audible and a regular regular rhythm    Extremities showed no edema    Sleep study in 2012 showed an AHI of 9.0.    Download from his machine shows he is used it 99% of the time.  He is has an AHI of 3.8.  He is on an AutoSet device of 4-20.         Assessment and Plan    Diagnoses and all orders for this visit:    1. Snoring (Primary)  -     Detailed AutoPAP Order    2. Obstructive sleep apnea, adult  -     Detailed AutoPAP Order    3. Atrial fibrillation, persistent (HCC)    Patient has a history of snoring nonrestorative sleep and was previously diagnosed with obstructive sleep apnea.  He has been doing very well with CPAP and has good control of his respiratory events on his current pressure settings.  We will continue on his current pressure settings and renew his supplies.  We have discussed other potential therapies including weight control, oral appliance, and surgery.  We have discussed the long-term consequences of untreated obstructive sleep apnea.  These include hypertension, diabetes, heart disease, stroke, and dementia.  He is encouraged to achieve ideal body weight.  He is encouraged to avoid alcohol and sedatives close to bedtime.  He is encouraged to practice lateral position sleep.    His wife says he sometimes has movements of his arms and legs.  He does not have memory of any dreams at this time.  I do not think they are REM sleep behavior disorder at this point.  They are to call if he has more of these events.    We will plan to see him back in 1 year.  He is to contact us " earlier symptoms worsen.  I spent 35 minutes caring for Vlad on this date of service. This time includes time spent by me in the following activities:reviewing tests, obtaining and/or reviewing a separately obtained history, performing a medically appropriate examination and/or evaluation , counseling and educating the patient/family/caregiver, ordering medications, tests, or procedures and documenting information in the medical record  Follow Up   Return in about 1 year (around 10/20/2022) for Annual visit, Next scheduled follow-up.  Patient was given instructions and counseling regarding his condition or for health maintenance advice. Please see specific information pulled into the AVS if appropriate.   Dino Carrion MD [facility]  Sleep Medicine  Pulmonary and Critical Care Medicine

## 2022-02-14 RX ORDER — RIVAROXABAN 20 MG/1
TABLET, FILM COATED ORAL
Qty: 90 TABLET | Refills: 2 | Status: SHIPPED | OUTPATIENT
Start: 2022-02-14 | End: 2023-01-18

## 2022-06-07 NOTE — THERAPY TREATMENT NOTE
"    Outpatient Rehabilitation - Wound/Debridement Treatment Note  [facility]     Patient Name: Vlad Arellano  : 1945  MRN: [id number]  Today's Date: 2017                Admit Date: 2017    Visit Dx:    ICD-10-CM ICD-9-CM   1. Open wound of right lower extremity, subsequent encounter S81.801D V58.89     891.0       Patient Active Problem List   Diagnosis   • Paroxysmal atrial fibrillation   • Tachy-krishna syndrome   • Benign hypertension   • Lower extremity edema   • PARKER (obstructive sleep apnea)   • Osteoarthritis   • Mild obesity   • MG, ocular (myasthenia gravis)   • Pain   • Hematoma of right thigh   • S/P Debridement irrigation and partial closure of cutaneous tissue right knee        Past Medical History:   Diagnosis Date   • Benign hypertension    • Lower extremity edema    • Mild obesity    • PARKER (obstructive sleep apnea)     requiring CPAP   • Osteoarthritis    • Paroxysmal atrial fibrillation    • Tachy-krishna syndrome         Past Surgical History:   Procedure Laterality Date   • CARDIOVERSION     • INCISION AND DRAINAGE LEG Right 2017    Procedure: INCISION AND DRAINAGE RIGHT KNEE;  Surgeon: Francisco Macias MD;  Location: [facility];  Service:    • NEPHROLITHOTRIPSY PERCUTANEOUS                 PT Ortho       17 1500    Subjective Comments    Subjective Comments Wife concerned they are late to meeting grand daughter. Pt states Dr Curtis was pleased with wound appearance; follow up in 4 wks if needed.   -MW    Subjective Pain    Able to rate subjective pain? yes  -MW    Pre-Treatment Pain Level 0  -MW    Post-Treatment Pain Level 0  -MW    Transfers    Transfer, Comment Pt supine on stretcher for tx.  -MW    Gait Assessment/Treatment    Gait, [place] Level independent  -MW      17 0800    Subjective Comments    Subjective Comments Pt without complaints.  Wife states \"It's healing a lot faster than I thought it would.\"  -JM    Subjective Pain    Able to rate " Will order stress test. "subjective pain? yes  -    Pre-Treatment Pain Level 0  -    Post-Treatment Pain Level 0  -JM    Transfers    Transfer, Comment Pt supine on stretcher for tx.  -    Gait Assessment/Treatment    Gait, [place] Level independent  -      User Key  (r) = Recorded By, (t) = Taken By, (c) = Cosigned By    Initials Name Provider Type    SHIV Ellis, PT Physical Therapist    JULISSA Inman, PT Physical Therapist                    [facility] Wound       09/27/17 1500          Incision 08/18/17 1641 Right leg    Incision - Properties Group Placement Date: 08/18/17  -JV Placement Time: 1641 -JV Side: Right  -JV Location: leg  -JV    Incision WDL ex  -MW      Dressing Appearance moist drainage;intact  -MW      Appearance drainage;moist;open areas;pink   95% beefy granulation, 5% pink new epith  -MW      Drainage Characteristics/Odor serosanguineous;no odor   blue drainage from HFB dressing  -MW      Drainage Amount small  -MW      Wound Cleaning cleansed with;other (see comments)   Phase one   -MW      Wound Interventions other (see comments);debrided   MIST x 10 min  -MW      Dressing Dressing applied;low-adherent;foam   6\" optifoam   -MW      Packing Incision packed with;other (see comments)   saline moistened HFB classic   -MW        User Key  (r) = Recorded By, (t) = Taken By, (c) = Cosigned By    Initials Name Provider Type    SHIV Ellis, DENISE Physical Therapist    ANETA Demarcus Harris, RN Registered Nurse                              Therapy Education       09/27/17 1500          Therapy Education    Education Details Cont POC   -MW      Given Symptoms/condition management;Bandaging/dressing change  -MW      Program Reinforced  -MW      How Provided Verbal  -MW      Provided to Patient;Caregiver  -MW      Level of Understanding Verbalized  -MW        User Key  (r) = Recorded By, (t) = Taken By, (c) = Cosigned By    Initials Name Provider Type    SHIV Ellis, DENISE Physical Therapist    "       Recommendation and Plan        PT Assessment/Plan       09/27/17 1500          Functional Limitations Other (comment)   wound mgmt  -MW    Impairments Integumentary integrity;Edema  -MW    Assessment Comments Wound with beefy granulation tissue and new epithelial growth along medial aspect. Continue MIST to promote increased blood flow, decrease bioburden and improve healing potential. Cont HFB for moist bacteriostatic healing environment.   -MW    Rehab Potential Good  -MW    Patient/caregiver participated in establishment of treatment plan and goals Yes  -MW    Patient would benefit from skilled therapy intervention Yes  -MW       PT Frequency 2x/week  -MW    Physical Therapy Interventions (Optional Details) wound care;patient/family education  -    PT Plan Comments MIST, debridement prn   -      User Key  (r) = Recorded By, (t) = Taken By, (c) = Cosigned By    Initials Name Provider Type    SHIV Ellis, PT Physical Therapist          Goals        PT OP Goals       09/27/17 1633       Time Calculation    PT Goal Re-Cert Due Date 12/17/17  -       User Key  (r) = Recorded By, (t) = Taken By, (c) = Cosigned By    Initials Name Provider Type    SHIV Ellis, PT Physical Therapist          PT Goal Re-Cert Due Date: 12/17/17            Time Calculation: Start Time: 1500    Therapy Charges for Today     Code Description Service Date Service Provider Modifiers Qty    [id number] HC PT NLFU MIST 9/27/2017 Ramona Ellis, PT GP 1                Ramona Ellis, PT  9/27/2017

## 2022-10-13 ENCOUNTER — OFFICE VISIT (OUTPATIENT)
Dept: CARDIOLOGY | Facility: CLINIC | Age: 77
End: 2022-10-13

## 2022-10-13 VITALS
WEIGHT: 259 LBS | SYSTOLIC BLOOD PRESSURE: 120 MMHG | OXYGEN SATURATION: 96 % | HEART RATE: 70 BPM | DIASTOLIC BLOOD PRESSURE: 80 MMHG | BODY MASS INDEX: 32.2 KG/M2 | HEIGHT: 75 IN

## 2022-10-13 DIAGNOSIS — I48.21 PERMANENT ATRIAL FIBRILLATION: Primary | ICD-10-CM

## 2022-10-13 DIAGNOSIS — I49.5 TACHY-BRADY SYNDROME: ICD-10-CM

## 2022-10-13 DIAGNOSIS — I10 ESSENTIAL HYPERTENSION: ICD-10-CM

## 2022-10-13 PROCEDURE — 99213 OFFICE O/P EST LOW 20 MIN: CPT | Performed by: INTERNAL MEDICINE

## 2022-10-26 ENCOUNTER — OFFICE VISIT (OUTPATIENT)
Dept: SLEEP MEDICINE | Facility: HOSPITAL | Age: 77
End: 2022-10-26

## 2022-10-26 VITALS
DIASTOLIC BLOOD PRESSURE: 81 MMHG | SYSTOLIC BLOOD PRESSURE: 133 MMHG | OXYGEN SATURATION: 96 % | HEIGHT: 75 IN | WEIGHT: 258 LBS | HEART RATE: 109 BPM | BODY MASS INDEX: 32.08 KG/M2

## 2022-10-26 DIAGNOSIS — G47.33 OSA (OBSTRUCTIVE SLEEP APNEA): Primary | ICD-10-CM

## 2022-10-26 PROCEDURE — 99213 OFFICE O/P EST LOW 20 MIN: CPT | Performed by: NURSE PRACTITIONER

## 2022-10-26 NOTE — PROGRESS NOTES
Sleep Clinic Follow Up Note    Chief Complaint  Follow-up    Subjective     History of Present Illness (from previous encounter on 10/20/2021):  Patient has a history of snoring noted for at least 23 years.  He denies having noted apneas prior to having his sleep study in 2012.  He was diagnosed then with mild obstructive sleep apnea.  He has been on CPAP since then.  He says he uses it nightly.  He occasionally has slight snoring with the CPAP on but that is when he is having a mask issue.  He uses a fullface mask.     When he uses his CPAP mask he is usually rested.  He denies having morning headaches.  If he falls asleep without his mask he still has snoring he says he generally is not sleepy during the day.  He denies having problems driving.  He occasionally has kicking of his legs at night but does not think it keeps him awake.  He denies having any chronic pain at night.     He goes to bed about 10 PM.  He will fall asleep in 20 minutes.  He awakens twice during the night.  He thinks he gets 7 to 8 hours of sleep and says he feels rested with his machine.  He denies any history of hypertension or diabetes.  He does have a history of atrial fibrillation.     Habits: Tobacco: He chews a fourth package tobacco per day     Alcohol: He has 3-4 beers per day     Caffeine: He has 3 mario alberto per day.    (End copied text).    Interval History:  Favian Arellano is a 77 y.o. male returns for follow up and compliance of CPAP therapy. The patient was last seen on 10/20/21. Overall the patient feels good with regard to therapy. The device appears to be/does not working appropriately. On average the patient sleeps 8-10 hours per night. The patient wakes 0 times per night. His wife wonders if an adjustment can be done. He will have frequent episodes of myoclonic jerks in his arms. 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    Further details are as follows:      [place] Scale is (out of 24): Total score: 4  "    Weight:  Current Weight:    Weight change in the last year:  loss: 0 lbs    The patient's relevant past medical, surgical, family, and social history reviewed and updated in Epic as appropriate.    PMH:    Past Medical History:   Diagnosis Date   • Benign hypertension    • History of eye surgery    • Lower extremity edema    • Mild obesity    • PARKER (obstructive sleep apnea)     requiring CPAP   • Osteoarthritis    • Paroxysmal atrial fibrillation (HCC)    • Tachy-krishna syndrome (HCC)      Past Surgical History:   Procedure Laterality Date   • CARDIOVERSION     • HERNIA REPAIR     • INCISION AND DRAINAGE LEG Right 8/18/2017    Procedure: INCISION AND DRAINAGE RIGHT KNEE;  Surgeon: Francisco Macias MD;  Location: [facility];  Service:    • JOINT REPLACEMENT     • NEPHROLITHOTRIPSY PERCUTANEOUS         Allergies   Allergen Reactions   • Naproxen      Patient states he has made changes on that       MEDS:  Prior to Admission medications    Medication Sig Start Date End Date Taking? Authorizing Provider   sertraline (ZOLOFT) 50 MG tablet Take 50 mg by mouth Daily.    Provider, MD Fatoumata   torsemide (DEMADEX) 20 MG tablet Take 1 tablet by mouth Daily. 10/13/21   Mercedez Webb APRN   Xarelto 20 MG tablet TAKE ONE (1) TABLET BY MOUTH DAILY. 2/14/22   Mercedez Webb APRN         FH:  Family History   Problem Relation Age of Onset   • Hypertension Other    • Cancer Sister    • Alzheimer's disease Mother        Objective   Vital Signs:  /81   Pulse 109   Ht 190.5 cm (75\")   Wt 117 kg (258 lb)   SpO2 96%   BMI 32.25 kg/m²           Physical Exam  Vitals reviewed.   Constitutional:       Appearance: Normal appearance.   HENT:      Head: Normocephalic and atraumatic.      Nose: Nose normal.      Mouth/Throat:      Mouth: Mucous membranes are moist.   Cardiovascular:      Rate and Rhythm: Normal rate and regular rhythm.      Heart sounds: No murmur heard.    No friction rub. No gallop. "   Pulmonary:      Effort: Pulmonary effort is normal. No respiratory distress.      Breath sounds: Normal breath sounds. No wheezing or rhonchi.   Neurological:      Mental Status: He is alert and oriented to person, place, and time.   Psychiatric:         Behavior: Behavior normal.         Mallampati Score: III (soft and hard palate and base of uvula visible)      CPAP Report:  AHI: 2.3/h  Days of Usage: 90/90 (100%)  Number of Days Greater than 4 hours: 90/90 (100%)  Average time (days used): 9 hours 42 minutes  95th Percentile Pressure: 14.6 cm H2O  Settings: CPAP AutoSet-minimum pressure 4 cm H2O, maximum pressure 20 cm H2O, EPR full-time, EPR level 3, response standard      Result Review :              Assessment and Plan  This is a 77-year-old male returns for follow-up compliance PAP therapy.  The patient has excellent compliance at 100% greater than 4 hours.  His sleep apnea appears to be adequately controlled with an AHI of 2.3/H.  I will refill the patient's supplies any return for follow-up compliance in 1 year or sooner should he have further questions or concerns.     Diagnoses and all orders for this visit:    1. PARKER (obstructive sleep apnea) (Primary)  -     PAP Therapy         The patient continues to use and benefit from CPAP therapy.    1. The patient was counseled regarding multimodal approach with healthy nutrition, healthy sleep, regular physical activity, social activities, counseling, and medications. Encouraged to practice lateral sleep position. Avoid alcohol and sedatives close to bedtime.     2.  We will refill supplies x1 year.  Return to clinic 1 year or sooner if symptoms warrant. I have reviewed the results of my evaluation and impression and discussed my recommendations in detail with the patient.    Follow Up  Return in about 1 year (around 10/26/2023).  Patient was given instructions and counseling regarding his condition or for health maintenance advice. Please see specific  information pulled into the AVS if appropriate.       LUCAS Bolaños, [facility]-BC  Pulmonology, Critical Care, and Sleep Medicine  Electronically signed by LUCAS Edgar, 10/26/22, 8:09 AM EDT.

## 2022-11-07 RX ORDER — TORSEMIDE 20 MG/1
TABLET ORAL
Qty: 90 TABLET | Refills: 1 | Status: SHIPPED | OUTPATIENT
Start: 2022-11-07 | End: 2023-02-20

## 2023-01-18 RX ORDER — RIVAROXABAN 20 MG/1
TABLET, FILM COATED ORAL
Qty: 90 TABLET | Refills: 1 | Status: SHIPPED | OUTPATIENT
Start: 2023-01-18 | End: 2023-01-18

## 2023-02-20 RX ORDER — TORSEMIDE 20 MG/1
TABLET ORAL
Qty: 90 TABLET | Refills: 0 | Status: SHIPPED | OUTPATIENT
Start: 2023-02-20

## 2023-05-04 ENCOUNTER — TELEPHONE (OUTPATIENT)
Dept: CARDIOLOGY | Facility: CLINIC | Age: 78
End: 2023-05-04
Payer: MEDICARE

## 2023-05-04 NOTE — TELEPHONE ENCOUNTER
PT scheduled for joint surgery at [facility] later this month. Chantelle from PAT at [facility] called inquiring how long he can hold his xarelto- pt on Xarelto for chronic atrial fib.       *note that I did ask Chantelle twice if they needed cardiac risk assessment for procedure and she said no, they just need holding instructions for Xarelto*

## 2023-08-28 RX ORDER — RIVAROXABAN 20 MG/1
TABLET, FILM COATED ORAL
Qty: 90 TABLET | Refills: 0 | Status: SHIPPED | OUTPATIENT
Start: 2023-08-28

## 2023-08-31 DIAGNOSIS — I49.5 TACHY-BRADY SYNDROME: ICD-10-CM

## 2023-08-31 DIAGNOSIS — I48.21 PERMANENT ATRIAL FIBRILLATION: Primary | ICD-10-CM

## 2023-08-31 DIAGNOSIS — Z79.899 LONG TERM CURRENT USE OF DIURETIC: ICD-10-CM

## 2023-08-31 RX ORDER — TORSEMIDE 20 MG/1
TABLET ORAL
Qty: 90 TABLET | Refills: 0 | Status: SHIPPED | OUTPATIENT
Start: 2023-08-31

## 2023-10-04 ENCOUNTER — LAB (OUTPATIENT)
Dept: LAB | Facility: HOSPITAL | Age: 78
End: 2023-10-04
Payer: MEDICARE

## 2023-10-04 DIAGNOSIS — I49.5 TACHY-BRADY SYNDROME: ICD-10-CM

## 2023-10-04 DIAGNOSIS — Z79.899 LONG TERM CURRENT USE OF DIURETIC: ICD-10-CM

## 2023-10-04 DIAGNOSIS — I48.21 PERMANENT ATRIAL FIBRILLATION: ICD-10-CM

## 2023-10-04 LAB
ALBUMIN SERPL-MCNC: 3.9 G/DL (ref 3.5–5.2)
ALBUMIN/GLOB SERPL: 1.4 G/DL
ALP SERPL-CCNC: 90 U/L (ref 39–117)
ALT SERPL W P-5'-P-CCNC: 14 U/L (ref 1–41)
ANION GAP SERPL CALCULATED.3IONS-SCNC: 12 MMOL/L (ref 5–15)
AST SERPL-CCNC: 27 U/L (ref 1–40)
BILIRUB SERPL-MCNC: 1.5 MG/DL (ref 0–1.2)
BUN SERPL-MCNC: 14 MG/DL (ref 8–23)
BUN/CREAT SERPL: 16.3 (ref 7–25)
CALCIUM SPEC-SCNC: 9.2 MG/DL (ref 8.6–10.5)
CHLORIDE SERPL-SCNC: 102 MMOL/L (ref 98–107)
CO2 SERPL-SCNC: 28 MMOL/L (ref 22–29)
CREAT SERPL-MCNC: 0.86 MG/DL (ref 0.76–1.27)
EGFRCR SERPLBLD CKD-EPI 2021: 89.2 ML/MIN/1.73
GLOBULIN UR ELPH-MCNC: 2.8 GM/DL
GLUCOSE SERPL-MCNC: 85 MG/DL (ref 65–99)
POTASSIUM SERPL-SCNC: 3.9 MMOL/L (ref 3.5–5.2)
PROT SERPL-MCNC: 6.7 G/DL (ref 6–8.5)
SODIUM SERPL-SCNC: 142 MMOL/L (ref 136–145)

## 2023-10-04 PROCEDURE — 36415 COLL VENOUS BLD VENIPUNCTURE: CPT

## 2023-10-04 PROCEDURE — 80053 COMPREHEN METABOLIC PANEL: CPT

## 2023-10-18 ENCOUNTER — OFFICE VISIT (OUTPATIENT)
Dept: CARDIOLOGY | Facility: CLINIC | Age: 78
End: 2023-10-18
Payer: MEDICARE

## 2023-10-18 VITALS
HEART RATE: 84 BPM | HEIGHT: 75 IN | SYSTOLIC BLOOD PRESSURE: 132 MMHG | DIASTOLIC BLOOD PRESSURE: 78 MMHG | OXYGEN SATURATION: 96 % | WEIGHT: 271.4 LBS | BODY MASS INDEX: 33.74 KG/M2

## 2023-10-18 DIAGNOSIS — I49.5 TACHY-BRADY SYNDROME: ICD-10-CM

## 2023-10-18 DIAGNOSIS — I10 ESSENTIAL HYPERTENSION: ICD-10-CM

## 2023-10-18 DIAGNOSIS — M79.89 SWELLING OF LOWER EXTREMITY: ICD-10-CM

## 2023-10-18 DIAGNOSIS — I48.21 PERMANENT ATRIAL FIBRILLATION: Primary | ICD-10-CM

## 2023-10-18 RX ORDER — OXYBUTYNIN CHLORIDE 5 MG/1
5 TABLET ORAL DAILY
COMMUNITY
Start: 2023-05-17

## 2023-10-18 RX ORDER — CETIRIZINE HYDROCHLORIDE 10 MG/1
10 CAPSULE, LIQUID FILLED ORAL DAILY
COMMUNITY
Start: 2023-05-17

## 2023-10-18 NOTE — PROGRESS NOTES
[facility] Card[facility]    Patient ID: Favian Arellano is a 78 y.o. male.  : 1945   Contact: [phone]    Encounter date: 10/18/2023    PCP: Saleem Yang MD      Chief complaint: No chief complaint on file.      Problem List:  Chronic atrial fibrillation  Diagnosed 4 years ago with external cardioversion x3.  CHADS-Vasc = 2 (HTN, Age).  On Xarelto.  Failed multaq and Sotalol  Asymptomatic  Opted for rate-control but has not required AV nodals   Tachy-krishna syndrome.   Hypertension.   Lower extremity edema.   PARKER requiring CPAP.   Osteoarthritis.   Mild obesity.   Family history of hypertension.   Right knee subcutaneous hematoma/seroma with skin necrosis. S/P debridement and partial closure. Following with IV antibiotics, wound care.  Surgical history:   External cardioversion x3.  Lithotripsy.    Allergies   Allergen Reactions    Naproxen      Patient states he has made changes on that       Current Medications:    Current Outpatient Medications:     rivaroxaban (Xarelto) 20 MG tablet, TAKE ONE (1) TABLET BY MOUTH DAILY., Disp: 90 tablet, Rfl: 0    sertraline (ZOLOFT) 50 MG tablet, Take 50 mg by mouth Daily., Disp: , Rfl:     torsemide (DEMADEX) 20 MG tablet, TAKE ONE (1) TABLET BY MOUTH DAILY., Disp: 90 tablet, Rfl: 0    HPI    Favian Arellano is a 78 y.o. male who presents today for a 1 year follow up of permanent atrial fibrillation, tachy-krishna syndrome, and cardiac risk factors. Since last visit, he has had his left knee replace. He has been doing well since the surgery. He is having so lower extremity edema that does not resolve over night. He is taking torsemide 20mg daily. He denies chest pain, shortness of breath, palpitations, lightheadedness, and syncope. He is following up with his PCP in a few weeks for lab work.       The following portions of the patient's history were reviewed and updated as appropriate: allergies, current medications and problem  list.    Pertinent positives as listed in the HPI.  All other systems reviewed are negative.         There were no vitals filed for this visit.    Physical Exam:  General: Alert and oriented.  Neck: Jugular venous pressure is within normal limits. Carotids have normal upstrokes without bruits.   Cardiovascular: Heart has a nondisplaced focal PMI. Regular rate and rhythm. No murmur, gallop or rub.  Lungs: Clear, no rales or wheezes. Equal expansion is noted.   Extremities: bilateral lower extremity edema 2+, bilateral DP and PT pulses 2+  Skin: Warm and dry.  Neurologic: Nonfocal.     Diagnostic Data (reviewed with patient):  Lab date: 05/23/2023  CMP: Glu 85, BUN 14, Creat 0.86, eGFR 89.2, Na 142, K 3.9, Cl 102, CO2 28, Ca 9.2, Alk Phos 90, AST 27, ALT 14 (10/04/2023)  CBC: WBC 9.4, RBC 4.05, HGB 12.9, HCT 39.3, MCV 97, MCH 31.9,   HbA1c: 13.7      Advance Care Planning   ACP discussion was held with the patient during this visit. Patient does not have an advance directive, information provided.           ECG 12 Lead    Date/Time: 10/18/2023 11:49 AM  Performed by: Mariaa Rosales APRN    Authorized by: Mariaa Rosales APRN  Comparison: compared with previous ECG from 7/31/2021  Similar to previous ECG  Rhythm: atrial fibrillation  Rate: normal  BPM: 84  Conduction: incomplete right bundle branch block          Assessment:    ICD-10-CM ICD-9-CM   1. Permanent atrial fibrillation  I48.21 427.31   2. Tachy-krishna syndrome  I49.5 427.81   3. Essential hypertension  I10 401.9         Plan:  Continue on Xarelto 20 mg daily for stroke prophylaxis.   Continue on torsemide 20 mg daily for fluid retention/lower extremity swelling.   Continue all other current medications.  Echo ordered for lower extremity swelling. If echo is normal, could consider increasing torsemide dose.   Encouraged patient to cut back on salt intake and wear compression stockings if tolerated.   F/up in 6 months, sooner if needed.      Mariaa  Bobby, APRN

## 2023-10-19 NOTE — PROGRESS NOTES
Sleep Clinic Follow Up Note    Chief Complaint  Follow-up    Subjective     History of Present Illness (from previous encounter on 10/26/2022):  This is a 77-year-old male returns for follow-up compliance PAP therapy.  The patient has excellent compliance at 100% greater than 4 hours.  His sleep apnea appears to be adequately controlled with an AHI of 2.3/H.  I will refill the patient's supplies any return for follow-up compliance in 1 year or sooner should he have further questions or concerns. (End copied text).    Interval History:  Favian Arellano is a 78 y.o. male returns for follow up and compliance of PAP therapy. The patient was last seen on 10/26/2020. Overall the patient feels good with regard to therapy. The device appears to be working appropriately. On average the patient sleeps 8 hours per night. The patient wakes 0 times per night.     The patient reports the following changes to their medical and medication history since they were last seen:  Knee replacment      Further details are as follows:      Dale Scale is (out of 24): Total score: 5     Weight:  Current Weight: 273 lb    Weight change in the last year:  gain: 0 lbs    The patient's relevant past medical, surgical, family, and social history reviewed and updated in Epic as appropriate.    PMH:    Past Medical History:   Diagnosis Date    Benign hypertension     History of eye surgery     Lower extremity edema     Mild obesity     PARKER (obstructive sleep apnea)     requiring CPAP    Osteoarthritis     Paroxysmal atrial fibrillation     Tachy-krishna syndrome      Past Surgical History:   Procedure Laterality Date    CARDIOVERSION      HERNIA REPAIR      INCISION AND DRAINAGE LEG Right 8/18/2017    Procedure: INCISION AND DRAINAGE RIGHT KNEE;  Surgeon: Francisco Macias MD;  Location: [facility];  Service:     JOINT REPLACEMENT      NEPHROLITHOTRIPSY PERCUTANEOUS         Allergies   Allergen Reactions    Naproxen      Patient states he  "has made changes on that       MEDS:  Prior to Admission medications    Medication Sig Start Date End Date Taking? Authorizing Provider   Cetirizine HCl (ZyrTEC Allergy) 10 MG capsule Take 10 mg by mouth Daily. 5/17/23   Fatoumata Boateng MD   NON FORMULARY Take 1 capsule by mouth 2 (Two) Times a Day. VITAMINS A,C,E-ZINC-COPPER (ICAPS AREDS) [phone] UNIT-MG-UNIT CAP    Fatoumata Boateng MD   oxybutynin (DITROPAN) 5 MG tablet Take 1 tablet by mouth Daily. 5/17/23   Fatoumata Boateng MD   rivaroxaban (Xarelto) 20 MG tablet TAKE ONE (1) TABLET BY MOUTH DAILY. 8/28/23   Namrata Kaur MD   sertraline (ZOLOFT) 50 MG tablet Take 1 tablet by mouth Daily.    Fatoumata Boateng MD   torsemide (DEMADEX) 20 MG tablet TAKE ONE (1) TABLET BY MOUTH DAILY. 8/31/23   Namrata Kaur MD         FH:  Family History   Problem Relation Age of Onset    Hypertension Other     Cancer Sister     Alzheimer's disease Mother        Objective   Vital Signs:  /76 (BP Location: Left arm, Patient Position: Sitting)   Pulse 80   Ht 190.5 cm (75\")   Wt 124 kg (273 lb 3.2 oz)   SpO2 96%   BMI 34.15 kg/m²     BMI is >= 30 and <35. (Class 1 Obesity). The following options were offered after discussion;: considering diet/exercise.        Physical Exam  Vitals reviewed.   Constitutional:       Appearance: Normal appearance.   HENT:      Head: Normocephalic and atraumatic.      Nose: Nose normal.      Mouth/Throat:      Mouth: Mucous membranes are moist.   Cardiovascular:      Rate and Rhythm: Normal rate and regular rhythm.      Heart sounds: No murmur heard.     No friction rub. No gallop.   Pulmonary:      Effort: Pulmonary effort is normal. No respiratory distress.      Breath sounds: Normal breath sounds. No wheezing or rhonchi.   Neurological:      Mental Status: He is alert and oriented to person, place, and time.   Psychiatric:         Behavior: Behavior normal.               Result Review :       "     PAP Report:  AHI: 2.3/h  Days of Usage: 90/90 (100%)  Number of Days Greater than 4 hours: 100%  Average time (days used): 9 hours 51 minutes  95th Percentile Pressure: 16.7 cm H2O  95th percentile leaks: 7.7 L/min  Settings: Auto CPAP-4/20 cm H2O, EPR full-time, EPR level 3, response standard.       Assessment and Plan  Favian Arellano is a 78 y.o. male who returns for follow-up and compliance of PAP therapy.  The Pap report has been reviewed.  Overall usage and compliance are excellent at 100%.  Patient averages 9 hours 51 minutes.  Sleep apnea is well controlled with an AHI of 2.3/H. I will refill the patient's supplies, and I have asked them to return for follow-up and compliance in 1 year or sooner should they have further questions or concerns.  With regard to episodes of sleep behaviors patient notes this occurs a few times every few weeks.  It does wake his wife up.  I have discussed the neurological risks including Parkinson's disease with sleep behavior/parasomnia.  I have asked him to try high-dose extended release melatonin.    Diagnoses and all orders for this visit:    1. PARKER (obstructive sleep apnea) (Primary)  -     PAP Therapy    2. Other parasomnia    3. Essential hypertension    4. Permanent atrial fibrillation    5. Obesity (BMI 30.0-34.9)                 The patient continues to use and benefit from PAP therapy.    1. The patient was counseled regarding multimodal approach with healthy nutrition, healthy sleep, regular physical activity, social activities, counseling, and medications. Encouraged to practice lateral sleep position. Avoid alcohol and sedatives close to bedtime.     2.  We will refill supplies x1 year.  Return to clinic 1 year or sooner if symptoms warrant. I have reviewed the results of my evaluation and impression and discussed my recommendations in detail with the patient.           Follow Up  Return in about 1 year (around 10/24/2024) for Annual visit.  Patient was given  instructions and counseling regarding his condition or for health maintenance advice. Please see specific information pulled into the AVS if appropriate.       LUCAS Bolaños, ACNP-BC  Pulmonology, Critical Care, and Sleep Medicine

## 2023-10-24 ENCOUNTER — OFFICE VISIT (OUTPATIENT)
Dept: SLEEP MEDICINE | Facility: HOSPITAL | Age: 78
End: 2023-10-24
Payer: MEDICARE

## 2023-10-24 VITALS
DIASTOLIC BLOOD PRESSURE: 76 MMHG | HEART RATE: 80 BPM | SYSTOLIC BLOOD PRESSURE: 143 MMHG | HEIGHT: 75 IN | WEIGHT: 273.2 LBS | BODY MASS INDEX: 33.97 KG/M2 | OXYGEN SATURATION: 96 %

## 2023-10-24 DIAGNOSIS — I10 ESSENTIAL HYPERTENSION: ICD-10-CM

## 2023-10-24 DIAGNOSIS — I48.21 PERMANENT ATRIAL FIBRILLATION: ICD-10-CM

## 2023-10-24 DIAGNOSIS — G47.59 OTHER PARASOMNIA: ICD-10-CM

## 2023-10-24 DIAGNOSIS — E66.9 OBESITY (BMI 30.0-34.9): ICD-10-CM

## 2023-10-24 DIAGNOSIS — G47.33 OSA (OBSTRUCTIVE SLEEP APNEA): Primary | ICD-10-CM

## 2023-10-26 ENCOUNTER — HOSPITAL ENCOUNTER (OUTPATIENT)
Dept: CARDIOLOGY | Facility: HOSPITAL | Age: 78
Discharge: HOME | End: 2023-10-26
Payer: MEDICARE

## 2023-10-26 DIAGNOSIS — I10 ESSENTIAL HYPERTENSION: ICD-10-CM

## 2023-10-26 DIAGNOSIS — I48.21 PERMANENT ATRIAL FIBRILLATION: ICD-10-CM

## 2023-10-26 DIAGNOSIS — M79.89 SWELLING OF LOWER EXTREMITY: ICD-10-CM

## 2023-10-26 LAB
ASCENDING AORTA: 4 CM
BH CV ECHO MEAS - AO MAX PG: 2.02 MMHG
BH CV ECHO MEAS - AO MEAN PG: 0.92 MMHG
BH CV ECHO MEAS - AO ROOT DIAM: 3 CM
BH CV ECHO MEAS - AO V2 MAX: 71.1 CM/SEC
BH CV ECHO MEAS - AO V2 VTI: 13.7 CM
BH CV ECHO MEAS - AVA(I,D): 3.6 CM2
BH CV ECHO MEAS - EDV(CUBED): 148 ML
BH CV ECHO MEAS - EDV(MOD-SP2): 128 ML
BH CV ECHO MEAS - EDV(MOD-SP4): 92 ML
BH CV ECHO MEAS - EF(MOD-BP): 63 %
BH CV ECHO MEAS - EF(MOD-SP2): 69.5 %
BH CV ECHO MEAS - EF(MOD-SP4): 56.5 %
BH CV ECHO MEAS - ESV(CUBED): 52 ML
BH CV ECHO MEAS - ESV(MOD-SP2): 39 ML
BH CV ECHO MEAS - ESV(MOD-SP4): 40 ML
BH CV ECHO MEAS - FS: 29.4 %
BH CV ECHO MEAS - IVS/LVPW: 1.02 CM
BH CV ECHO MEAS - IVSD: 1.08 CM
BH CV ECHO MEAS - LA DIMENSION: 4.8 CM
BH CV ECHO MEAS - LV DIASTOLIC VOL/BSA (35-75): 36.7 CM2
BH CV ECHO MEAS - LV MASS(C)D: 218.5 GRAMS
BH CV ECHO MEAS - LV MAX PG: 1.68 MMHG
BH CV ECHO MEAS - LV MEAN PG: 0.82 MMHG
BH CV ECHO MEAS - LV SYSTOLIC VOL/BSA (12-30): 16 CM2
BH CV ECHO MEAS - LV V1 MAX: 64.8 CM/SEC
BH CV ECHO MEAS - LV V1 VTI: 13 CM
BH CV ECHO MEAS - LVIDD: 5.3 CM
BH CV ECHO MEAS - LVIDS: 3.7 CM
BH CV ECHO MEAS - LVOT AREA: 3.8 CM2
BH CV ECHO MEAS - LVOT DIAM: 2.21 CM
BH CV ECHO MEAS - LVPWD: 1.06 CM
BH CV ECHO MEAS - MV DEC SLOPE: 462.6 CM/SEC2
BH CV ECHO MEAS - MV DEC TIME: 0.16 SEC
BH CV ECHO MEAS - MV E MAX VEL: 102.2 CM/SEC
BH CV ECHO MEAS - MV MAX PG: 5 MMHG
BH CV ECHO MEAS - MV MEAN PG: 1.98 MMHG
BH CV ECHO MEAS - MV P1/2T: 68.8 MSEC
BH CV ECHO MEAS - MV V2 VTI: 20.7 CM
BH CV ECHO MEAS - MVA(P1/2T): 3.2 CM2
BH CV ECHO MEAS - MVA(VTI): 2.41 CM2
BH CV ECHO MEAS - PA ACC SLOPE: 508.2 CM/SEC2
BH CV ECHO MEAS - PA ACC TIME: 0.11 SEC
BH CV ECHO MEAS - RAP SYSTOLE: 3 MMHG
BH CV ECHO MEAS - RVSP: 16.6 MMHG
BH CV ECHO MEAS - SI(MOD-SP2): 35.5 ML/M2
BH CV ECHO MEAS - SI(MOD-SP4): 20.8 ML/M2
BH CV ECHO MEAS - SV(LVOT): 49.9 ML
BH CV ECHO MEAS - SV(MOD-SP2): 89 ML
BH CV ECHO MEAS - SV(MOD-SP4): 52 ML
BH CV ECHO MEAS - TAPSE (>1.6): 2.4 CM
BH CV ECHO MEAS - TR MAX PG: 13.6 MMHG
BH CV ECHO MEAS - TR MAX VEL: 183.3 CM/SEC
BH CV XLRA - RV LENGTH: 8.4 CM
BH CV XLRA - TDI S': 11.3 CM/SEC
LEFT ATRIUM VOLUME INDEX: 36 ML/M2
LV EF 2D ECHO EST: 55 %

## 2023-10-26 PROCEDURE — 93306 TTE W/DOPPLER COMPLETE: CPT

## 2023-10-27 ENCOUNTER — TELEPHONE (OUTPATIENT)
Dept: CARDIOLOGY | Facility: CLINIC | Age: 78
End: 2023-10-27
Payer: MEDICARE

## 2023-10-27 NOTE — TELEPHONE ENCOUNTER
Spoke to Mr. Arellano and let him know the results of his ECHO. He has been watching his salt intake and his lower extremity swelling has improved. Nothing further recommended.     LUCAS Ruiz

## 2023-11-09 RX ORDER — RIVAROXABAN 20 MG/1
TABLET, FILM COATED ORAL
Qty: 90 TABLET | Refills: 3 | Status: SHIPPED | OUTPATIENT
Start: 2023-11-09

## 2023-12-07 RX ORDER — TORSEMIDE 20 MG/1
TABLET ORAL
Qty: 90 TABLET | Refills: 0 | Status: SHIPPED | OUTPATIENT
Start: 2023-12-07

## 2023-12-07 NOTE — TELEPHONE ENCOUNTER
Lab Results   Component Value Date    GLUCOSE 85 10/04/2023    BUN 14 10/04/2023    CREATININE 0.86 10/04/2023    EGFR 89.2 10/04/2023    BCR 16.3 10/04/2023    K 3.9 10/04/2023    CO2 28.0 10/04/2023    CALCIUM 9.2 10/04/2023    ALBUMIN 3.9 10/04/2023    BILITOT 1.5 (H) 10/04/2023    AST 27 10/04/2023    ALT 14 10/04/2023

## 2024-01-02 ENCOUNTER — HOSPITAL ENCOUNTER (INPATIENT)
Facility: HOSPITAL | Age: 79
LOS: 4 days | Discharge: HOME | End: 2024-01-07
Attending: STUDENT IN AN ORGANIZED HEALTH CARE EDUCATION/TRAINING PROGRAM | Admitting: HOSPITALIST
Payer: MEDICARE

## 2024-01-02 ENCOUNTER — APPOINTMENT (OUTPATIENT)
Dept: GENERAL RADIOLOGY | Facility: HOSPITAL | Age: 79
End: 2024-01-02
Payer: MEDICARE

## 2024-01-02 ENCOUNTER — APPOINTMENT (OUTPATIENT)
Dept: MRI IMAGING | Facility: HOSPITAL | Age: 79
End: 2024-01-02
Payer: MEDICARE

## 2024-01-02 DIAGNOSIS — G91.2 NORMAL PRESSURE HYDROCEPHALUS: Primary | ICD-10-CM

## 2024-01-02 DIAGNOSIS — G62.9 NEUROPATHY: ICD-10-CM

## 2024-01-02 DIAGNOSIS — I10 ESSENTIAL HYPERTENSION: ICD-10-CM

## 2024-01-02 DIAGNOSIS — R13.10 DYSPHAGIA, UNSPECIFIED TYPE: ICD-10-CM

## 2024-01-02 DIAGNOSIS — J18.9 PNEUMONIA OF RIGHT LOWER LOBE DUE TO INFECTIOUS ORGANISM: ICD-10-CM

## 2024-01-02 DIAGNOSIS — G64 OTHER DISORDERS OF PERIPHERAL NERVOUS SYSTEM: ICD-10-CM

## 2024-01-02 DIAGNOSIS — E66.9 MILD OBESITY: ICD-10-CM

## 2024-01-02 DIAGNOSIS — I48.21 PERMANENT ATRIAL FIBRILLATION: ICD-10-CM

## 2024-01-02 DIAGNOSIS — R41.0 CONFUSION: ICD-10-CM

## 2024-01-02 LAB
ALBUMIN SERPL-MCNC: 4 G/DL (ref 3.5–5.2)
ALBUMIN/GLOB SERPL: 1.3 G/DL
ALP SERPL-CCNC: 89 U/L (ref 39–117)
ALT SERPL W P-5'-P-CCNC: 14 U/L (ref 1–41)
AMPHET+METHAMPHET UR QL: NEGATIVE
AMPHETAMINES UR QL: NEGATIVE
ANION GAP SERPL CALCULATED.3IONS-SCNC: 14 MMOL/L (ref 5–15)
APAP SERPL-MCNC: <5 MCG/ML (ref 0–30)
ARTERIAL PATENCY WRIST A: POSITIVE
AST SERPL-CCNC: 27 U/L (ref 1–40)
ATMOSPHERIC PRESS: ABNORMAL MM[HG]
B PARAPERT DNA SPEC QL NAA+PROBE: NOT DETECTED
B PERT DNA SPEC QL NAA+PROBE: NOT DETECTED
BACTERIA UR QL AUTO: ABNORMAL /HPF
BARBITURATES UR QL SCN: NEGATIVE
BASE EXCESS BLDA CALC-SCNC: 2.1 MMOL/L (ref 0–2)
BASOPHILS # BLD AUTO: 0.03 10*3/MM3 (ref 0–0.2)
BASOPHILS NFR BLD AUTO: 0.2 % (ref 0–1.5)
BDY SITE: ABNORMAL
BENZODIAZ UR QL SCN: NEGATIVE
BILIRUB SERPL-MCNC: 2.4 MG/DL (ref 0–1.2)
BILIRUB UR QL STRIP: NEGATIVE
BODY TEMPERATURE: 37
BUN SERPL-MCNC: 15 MG/DL (ref 8–23)
BUN/CREAT SERPL: 16.3 (ref 7–25)
BUPRENORPHINE SERPL-MCNC: NEGATIVE NG/ML
C PNEUM DNA NPH QL NAA+NON-PROBE: NOT DETECTED
CALCIUM SPEC-SCNC: 8.8 MG/DL (ref 8.6–10.5)
CANNABINOIDS SERPL QL: NEGATIVE
CHLORIDE SERPL-SCNC: 96 MMOL/L (ref 98–107)
CLARITY UR: CLEAR
CO2 BLDA-SCNC: 26.7 MMOL/L (ref 22–33)
CO2 SERPL-SCNC: 23 MMOL/L (ref 22–29)
COCAINE UR QL: NEGATIVE
COHGB MFR BLD: 2 % (ref 0–2)
COLOR UR: YELLOW
CREAT SERPL-MCNC: 0.92 MG/DL (ref 0.76–1.27)
CRP SERPL-MCNC: 19.37 MG/DL (ref 0–0.5)
D-LACTATE SERPL-SCNC: 1.5 MMOL/L (ref 0.5–2)
DEPRECATED RDW RBC AUTO: 46.2 FL (ref 37–54)
EGFRCR SERPLBLD CKD-EPI 2021: 85.7 ML/MIN/1.73
EOSINOPHIL # BLD AUTO: 0.01 10*3/MM3 (ref 0–0.4)
EOSINOPHIL NFR BLD AUTO: 0.1 % (ref 0.3–6.2)
EPAP: 0
ERYTHROCYTE [DISTWIDTH] IN BLOOD BY AUTOMATED COUNT: 13.1 % (ref 12.3–15.4)
ETHANOL BLD-MCNC: <10 MG/DL (ref 0–10)
FENTANYL UR-MCNC: NEGATIVE NG/ML
FLUAV SUBTYP SPEC NAA+PROBE: NOT DETECTED
FLUBV RNA ISLT QL NAA+PROBE: NOT DETECTED
GLOBULIN UR ELPH-MCNC: 3.2 GM/DL
GLUCOSE SERPL-MCNC: 119 MG/DL (ref 65–99)
GLUCOSE UR STRIP-MCNC: NEGATIVE MG/DL
HADV DNA SPEC NAA+PROBE: NOT DETECTED
HCO3 BLDA-SCNC: 25.6 MMOL/L (ref 20–26)
HCOV 229E RNA SPEC QL NAA+PROBE: NOT DETECTED
HCOV HKU1 RNA SPEC QL NAA+PROBE: NOT DETECTED
HCOV NL63 RNA SPEC QL NAA+PROBE: NOT DETECTED
HCOV OC43 RNA SPEC QL NAA+PROBE: NOT DETECTED
HCT VFR BLD AUTO: 43.5 % (ref 37.5–51)
HCT VFR BLD CALC: 46.4 % (ref 38–51)
HGB BLD-MCNC: 15.1 G/DL (ref 13–17.7)
HGB BLDA-MCNC: 15.1 G/DL (ref 13.5–17.5)
HGB UR QL STRIP.AUTO: ABNORMAL
HMPV RNA NPH QL NAA+NON-PROBE: NOT DETECTED
HOLD SPECIMEN: NORMAL
HPIV1 RNA ISLT QL NAA+PROBE: NOT DETECTED
HPIV2 RNA SPEC QL NAA+PROBE: NOT DETECTED
HPIV3 RNA NPH QL NAA+PROBE: NOT DETECTED
HPIV4 P GENE NPH QL NAA+PROBE: NOT DETECTED
HYALINE CASTS UR QL AUTO: ABNORMAL /LPF
IMM GRANULOCYTES # BLD AUTO: 0.06 10*3/MM3 (ref 0–0.05)
IMM GRANULOCYTES NFR BLD AUTO: 0.5 % (ref 0–0.5)
INHALED O2 CONCENTRATION: 21 %
IPAP: 0
KETONES UR QL STRIP: ABNORMAL
LEUKOCYTE ESTERASE UR QL STRIP.AUTO: ABNORMAL
LIPASE SERPL-CCNC: 31 U/L (ref 13–60)
LYMPHOCYTES # BLD AUTO: 0.64 10*3/MM3 (ref 0.7–3.1)
LYMPHOCYTES NFR BLD AUTO: 4.9 % (ref 19.6–45.3)
M PNEUMO IGG SER IA-ACNC: NOT DETECTED
MAGNESIUM SERPL-MCNC: 2.3 MG/DL (ref 1.6–2.4)
MCH RBC QN AUTO: 33.2 PG (ref 26.6–33)
MCHC RBC AUTO-ENTMCNC: 34.7 G/DL (ref 31.5–35.7)
MCV RBC AUTO: 95.6 FL (ref 79–97)
METHADONE UR QL SCN: NEGATIVE
METHGB BLD QL: 0.2 % (ref 0–1.5)
MODALITY: ABNORMAL
MONOCYTES # BLD AUTO: 1.57 10*3/MM3 (ref 0.1–0.9)
MONOCYTES NFR BLD AUTO: 12 % (ref 5–12)
NEUTROPHILS NFR BLD AUTO: 10.77 10*3/MM3 (ref 1.7–7)
NEUTROPHILS NFR BLD AUTO: 82.3 % (ref 42.7–76)
NITRITE UR QL STRIP: NEGATIVE
NRBC BLD AUTO-RTO: 0 /100 WBC (ref 0–0.2)
OPIATES UR QL: NEGATIVE
OXYCODONE UR QL SCN: NEGATIVE
OXYHGB MFR BLDV: 93.8 % (ref 94–99)
PAW @ PEAK INSP FLOW SETTING VENT: 0 CMH2O
PCO2 BLDA: 35.8 MM HG (ref 35–45)
PCO2 TEMP ADJ BLD: 35.8 MM HG (ref 35–48)
PCP UR QL SCN: NEGATIVE
PH BLDA: 7.46 PH UNITS (ref 7.35–7.45)
PH UR STRIP.AUTO: 5.5 [PH] (ref 5–8)
PH, TEMP CORRECTED: 7.46 PH UNITS
PHOSPHATE SERPL-MCNC: 2.3 MG/DL (ref 2.5–4.5)
PLATELET # BLD AUTO: 251 10*3/MM3 (ref 140–450)
PMV BLD AUTO: 9.4 FL (ref 6–12)
PO2 BLDA: 70.8 MM HG (ref 83–108)
PO2 TEMP ADJ BLD: 70.8 MM HG (ref 83–108)
POTASSIUM SERPL-SCNC: 4 MMOL/L (ref 3.5–5.2)
PROCALCITONIN SERPL-MCNC: 0.31 NG/ML (ref 0–0.25)
PROT SERPL-MCNC: 7.2 G/DL (ref 6–8.5)
PROT UR QL STRIP: ABNORMAL
RBC # BLD AUTO: 4.55 10*6/MM3 (ref 4.14–5.8)
RBC # UR STRIP: ABNORMAL /HPF
REF LAB TEST METHOD: ABNORMAL
RHINOVIRUS RNA SPEC NAA+PROBE: DETECTED
RSV RNA NPH QL NAA+NON-PROBE: NOT DETECTED
SALICYLATES SERPL-MCNC: <0.3 MG/DL
SARS-COV-2 RNA NPH QL NAA+NON-PROBE: NOT DETECTED
SODIUM SERPL-SCNC: 133 MMOL/L (ref 136–145)
SP GR UR STRIP: 1.02 (ref 1–1.03)
SQUAMOUS #/AREA URNS HPF: ABNORMAL /HPF
T4 FREE SERPL-MCNC: 1.14 NG/DL (ref 0.93–1.7)
TOTAL RATE: 0 BREATHS/MINUTE
TRICYCLICS UR QL SCN: NEGATIVE
TROPONIN T SERPL HS-MCNC: 22 NG/L
TSH SERPL DL<=0.05 MIU/L-ACNC: 0.98 UIU/ML (ref 0.27–4.2)
UROBILINOGEN UR QL STRIP: ABNORMAL
WBC # UR STRIP: ABNORMAL /HPF
WBC NRBC COR # BLD AUTO: 13.08 10*3/MM3 (ref 3.4–10.8)
WHOLE BLOOD HOLD COAG: NORMAL
WHOLE BLOOD HOLD SPECIMEN: NORMAL

## 2024-01-02 PROCEDURE — 80053 COMPREHEN METABOLIC PANEL: CPT | Performed by: STUDENT IN AN ORGANIZED HEALTH CARE EDUCATION/TRAINING PROGRAM

## 2024-01-02 PROCEDURE — 83605 ASSAY OF LACTIC ACID: CPT | Performed by: STUDENT IN AN ORGANIZED HEALTH CARE EDUCATION/TRAINING PROGRAM

## 2024-01-02 PROCEDURE — 84484 ASSAY OF TROPONIN QUANT: CPT | Performed by: STUDENT IN AN ORGANIZED HEALTH CARE EDUCATION/TRAINING PROGRAM

## 2024-01-02 PROCEDURE — 25810000003 SODIUM CHLORIDE 0.9 % SOLUTION: Performed by: STUDENT IN AN ORGANIZED HEALTH CARE EDUCATION/TRAINING PROGRAM

## 2024-01-02 PROCEDURE — 87086 URINE CULTURE/COLONY COUNT: CPT | Performed by: STUDENT IN AN ORGANIZED HEALTH CARE EDUCATION/TRAINING PROGRAM

## 2024-01-02 PROCEDURE — 81001 URINALYSIS AUTO W/SCOPE: CPT | Performed by: STUDENT IN AN ORGANIZED HEALTH CARE EDUCATION/TRAINING PROGRAM

## 2024-01-02 PROCEDURE — 80179 DRUG ASSAY SALICYLATE: CPT | Performed by: STUDENT IN AN ORGANIZED HEALTH CARE EDUCATION/TRAINING PROGRAM

## 2024-01-02 PROCEDURE — 85025 COMPLETE CBC W/AUTO DIFF WBC: CPT | Performed by: STUDENT IN AN ORGANIZED HEALTH CARE EDUCATION/TRAINING PROGRAM

## 2024-01-02 PROCEDURE — 84145 PROCALCITONIN (PCT): CPT | Performed by: STUDENT IN AN ORGANIZED HEALTH CARE EDUCATION/TRAINING PROGRAM

## 2024-01-02 PROCEDURE — 83050 HGB METHEMOGLOBIN QUAN: CPT

## 2024-01-02 PROCEDURE — 71045 X-RAY EXAM CHEST 1 VIEW: CPT

## 2024-01-02 PROCEDURE — 87150 DNA/RNA AMPLIFIED PROBE: CPT | Performed by: STUDENT IN AN ORGANIZED HEALTH CARE EDUCATION/TRAINING PROGRAM

## 2024-01-02 PROCEDURE — 0202U NFCT DS 22 TRGT SARS-COV-2: CPT | Performed by: STUDENT IN AN ORGANIZED HEALTH CARE EDUCATION/TRAINING PROGRAM

## 2024-01-02 PROCEDURE — 84443 ASSAY THYROID STIM HORMONE: CPT | Performed by: STUDENT IN AN ORGANIZED HEALTH CARE EDUCATION/TRAINING PROGRAM

## 2024-01-02 PROCEDURE — 83735 ASSAY OF MAGNESIUM: CPT | Performed by: STUDENT IN AN ORGANIZED HEALTH CARE EDUCATION/TRAINING PROGRAM

## 2024-01-02 PROCEDURE — 99285 EMERGENCY DEPT VISIT HI MDM: CPT

## 2024-01-02 PROCEDURE — 36600 WITHDRAWAL OF ARTERIAL BLOOD: CPT

## 2024-01-02 PROCEDURE — 82375 ASSAY CARBOXYHB QUANT: CPT

## 2024-01-02 PROCEDURE — 82077 ASSAY SPEC XCP UR&BREATH IA: CPT | Performed by: STUDENT IN AN ORGANIZED HEALTH CARE EDUCATION/TRAINING PROGRAM

## 2024-01-02 PROCEDURE — 80307 DRUG TEST PRSMV CHEM ANLYZR: CPT | Performed by: STUDENT IN AN ORGANIZED HEALTH CARE EDUCATION/TRAINING PROGRAM

## 2024-01-02 PROCEDURE — 84439 ASSAY OF FREE THYROXINE: CPT | Performed by: STUDENT IN AN ORGANIZED HEALTH CARE EDUCATION/TRAINING PROGRAM

## 2024-01-02 PROCEDURE — 83690 ASSAY OF LIPASE: CPT | Performed by: STUDENT IN AN ORGANIZED HEALTH CARE EDUCATION/TRAINING PROGRAM

## 2024-01-02 PROCEDURE — 36415 COLL VENOUS BLD VENIPUNCTURE: CPT

## 2024-01-02 PROCEDURE — 82805 BLOOD GASES W/O2 SATURATION: CPT

## 2024-01-02 PROCEDURE — 87449 NOS EACH ORGANISM AG IA: CPT | Performed by: INTERNAL MEDICINE

## 2024-01-02 PROCEDURE — 84100 ASSAY OF PHOSPHORUS: CPT | Performed by: STUDENT IN AN ORGANIZED HEALTH CARE EDUCATION/TRAINING PROGRAM

## 2024-01-02 PROCEDURE — 80143 DRUG ASSAY ACETAMINOPHEN: CPT | Performed by: STUDENT IN AN ORGANIZED HEALTH CARE EDUCATION/TRAINING PROGRAM

## 2024-01-02 PROCEDURE — 87040 BLOOD CULTURE FOR BACTERIA: CPT | Performed by: STUDENT IN AN ORGANIZED HEALTH CARE EDUCATION/TRAINING PROGRAM

## 2024-01-02 PROCEDURE — 93005 ELECTROCARDIOGRAM TRACING: CPT | Performed by: STUDENT IN AN ORGANIZED HEALTH CARE EDUCATION/TRAINING PROGRAM

## 2024-01-02 PROCEDURE — 70551 MRI BRAIN STEM W/O DYE: CPT

## 2024-01-02 PROCEDURE — 86140 C-REACTIVE PROTEIN: CPT | Performed by: STUDENT IN AN ORGANIZED HEALTH CARE EDUCATION/TRAINING PROGRAM

## 2024-01-02 RX ORDER — SODIUM CHLORIDE 0.9 % (FLUSH) 0.9 %
10 SYRINGE (ML) INJECTION AS NEEDED
Status: DISCONTINUED | OUTPATIENT
Start: 2024-01-02 | End: 2024-01-03

## 2024-01-02 RX ORDER — METOPROLOL TARTRATE 1 MG/ML
5 INJECTION, SOLUTION INTRAVENOUS ONCE
Qty: 5 ML | Refills: 0 | Status: COMPLETED | OUTPATIENT
Start: 2024-01-02 | End: 2024-01-02

## 2024-01-02 RX ADMIN — SODIUM CHLORIDE 500 ML: 9 INJECTION, SOLUTION INTRAVENOUS at 21:45

## 2024-01-02 RX ADMIN — METOPROLOL TARTRATE 5 MG: 5 INJECTION INTRAVENOUS at 21:48

## 2024-01-02 NOTE — Clinical Note
Level of Care: Telemetry [5]   Diagnosis: Ataxia [833250]   Admitting Physician: JEREMÍAS BAUMANN III [[id number]]   Attending Physician: JEREMÍAS BAUMANN III [[id number]]   Isolate for COVID?: No [0]   Bed Request Comments: tele   Certification: I Certify That Inpatient Hospital Services Are Medically Necessary For Greater Than 2 Midnights

## 2024-01-03 ENCOUNTER — APPOINTMENT (OUTPATIENT)
Dept: CT IMAGING | Facility: HOSPITAL | Age: 79
End: 2024-01-03
Payer: MEDICARE

## 2024-01-03 PROBLEM — R27.0 ATAXIA: Status: ACTIVE | Noted: 2024-01-03

## 2024-01-03 LAB
ALBUMIN SERPL-MCNC: 3.4 G/DL (ref 3.5–5.2)
ALBUMIN/GLOB SERPL: 1.7 G/DL
ALP SERPL-CCNC: 70 U/L (ref 39–117)
ALT SERPL W P-5'-P-CCNC: 11 U/L (ref 1–41)
ANION GAP SERPL CALCULATED.3IONS-SCNC: 10 MMOL/L (ref 5–15)
AST SERPL-CCNC: 26 U/L (ref 1–40)
BACTERIA SPEC RESP CULT: NORMAL
BASOPHILS # BLD AUTO: 0.03 10*3/MM3 (ref 0–0.2)
BASOPHILS NFR BLD AUTO: 0.3 % (ref 0–1.5)
BILIRUB SERPL-MCNC: 2.3 MG/DL (ref 0–1.2)
BUN SERPL-MCNC: 17 MG/DL (ref 8–23)
BUN/CREAT SERPL: 19.1 (ref 7–25)
CALCIUM SPEC-SCNC: 8.3 MG/DL (ref 8.6–10.5)
CHLORIDE SERPL-SCNC: 99 MMOL/L (ref 98–107)
CO2 SERPL-SCNC: 25 MMOL/L (ref 22–29)
CREAT SERPL-MCNC: 0.89 MG/DL (ref 0.76–1.27)
DEPRECATED RDW RBC AUTO: 47 FL (ref 37–54)
EGFRCR SERPLBLD CKD-EPI 2021: 88.3 ML/MIN/1.73
EOSINOPHIL # BLD AUTO: 0.03 10*3/MM3 (ref 0–0.4)
EOSINOPHIL NFR BLD AUTO: 0.3 % (ref 0.3–6.2)
ERYTHROCYTE [DISTWIDTH] IN BLOOD BY AUTOMATED COUNT: 13.2 % (ref 12.3–15.4)
GLOBULIN UR ELPH-MCNC: 2 GM/DL
GLUCOSE BLDC GLUCOMTR-MCNC: 131 MG/DL (ref 70–130)
GLUCOSE SERPL-MCNC: 121 MG/DL (ref 65–99)
GRAM STN SPEC: NORMAL
HBA1C MFR BLD: 4.8 % (ref 4.8–5.6)
HCT VFR BLD AUTO: 37.9 % (ref 37.5–51)
HGB BLD-MCNC: 12.9 G/DL (ref 13–17.7)
IMM GRANULOCYTES # BLD AUTO: 0.06 10*3/MM3 (ref 0–0.05)
IMM GRANULOCYTES NFR BLD AUTO: 0.5 % (ref 0–0.5)
L PNEUMO1 AG UR QL IA: NEGATIVE
LYMPHOCYTES # BLD AUTO: 1.07 10*3/MM3 (ref 0.7–3.1)
LYMPHOCYTES NFR BLD AUTO: 9.7 % (ref 19.6–45.3)
MAGNESIUM SERPL-MCNC: 2.3 MG/DL (ref 1.6–2.4)
MCH RBC QN AUTO: 32.9 PG (ref 26.6–33)
MCHC RBC AUTO-ENTMCNC: 34 G/DL (ref 31.5–35.7)
MCV RBC AUTO: 96.7 FL (ref 79–97)
MONOCYTES # BLD AUTO: 1.78 10*3/MM3 (ref 0.1–0.9)
MONOCYTES NFR BLD AUTO: 16.2 % (ref 5–12)
MRSA DNA SPEC QL NAA+PROBE: NEGATIVE
NEUTROPHILS NFR BLD AUTO: 73 % (ref 42.7–76)
NEUTROPHILS NFR BLD AUTO: 8.03 10*3/MM3 (ref 1.7–7)
NRBC BLD AUTO-RTO: 0 /100 WBC (ref 0–0.2)
PLATELET # BLD AUTO: 215 10*3/MM3 (ref 140–450)
PMV BLD AUTO: 9.5 FL (ref 6–12)
POTASSIUM SERPL-SCNC: 4.2 MMOL/L (ref 3.5–5.2)
PROT SERPL-MCNC: 5.4 G/DL (ref 6–8.5)
RBC # BLD AUTO: 3.92 10*6/MM3 (ref 4.14–5.8)
S PNEUM AG SPEC QL LA: NEGATIVE
SODIUM SERPL-SCNC: 134 MMOL/L (ref 136–145)
WBC NRBC COR # BLD AUTO: 11 10*3/MM3 (ref 3.4–10.8)

## 2024-01-03 PROCEDURE — 83036 HEMOGLOBIN GLYCOSYLATED A1C: CPT | Performed by: STUDENT IN AN ORGANIZED HEALTH CARE EDUCATION/TRAINING PROGRAM

## 2024-01-03 PROCEDURE — 82746 ASSAY OF FOLIC ACID SERUM: CPT | Performed by: STUDENT IN AN ORGANIZED HEALTH CARE EDUCATION/TRAINING PROGRAM

## 2024-01-03 PROCEDURE — 92610 EVALUATE SWALLOWING FUNCTION: CPT

## 2024-01-03 PROCEDURE — 25510000001 IOPAMIDOL PER 1 ML: Performed by: STUDENT IN AN ORGANIZED HEALTH CARE EDUCATION/TRAINING PROGRAM

## 2024-01-03 PROCEDURE — 87641 MR-STAPH DNA AMP PROBE: CPT

## 2024-01-03 PROCEDURE — 71275 CT ANGIOGRAPHY CHEST: CPT

## 2024-01-03 PROCEDURE — 94799 UNLISTED PULMONARY SVC/PX: CPT

## 2024-01-03 PROCEDURE — 99223 1ST HOSP IP/OBS HIGH 75: CPT | Performed by: STUDENT IN AN ORGANIZED HEALTH CARE EDUCATION/TRAINING PROGRAM

## 2024-01-03 PROCEDURE — 97161 PT EVAL LOW COMPLEX 20 MIN: CPT

## 2024-01-03 PROCEDURE — 83735 ASSAY OF MAGNESIUM: CPT | Performed by: INTERNAL MEDICINE

## 2024-01-03 PROCEDURE — 85025 COMPLETE CBC W/AUTO DIFF WBC: CPT | Performed by: INTERNAL MEDICINE

## 2024-01-03 PROCEDURE — 82948 REAGENT STRIP/BLOOD GLUCOSE: CPT

## 2024-01-03 PROCEDURE — 87205 SMEAR GRAM STAIN: CPT | Performed by: INTERNAL MEDICINE

## 2024-01-03 PROCEDURE — 25810000003 SODIUM CHLORIDE 0.9 % SOLUTION: Performed by: INTERNAL MEDICINE

## 2024-01-03 PROCEDURE — 25010000002 PIPERACILLIN SOD-TAZOBACTAM PER 1 G: Performed by: INTERNAL MEDICINE

## 2024-01-03 PROCEDURE — 25010000002 VANCOMYCIN 10 G RECONSTITUTED SOLUTION: Performed by: STUDENT IN AN ORGANIZED HEALTH CARE EDUCATION/TRAINING PROGRAM

## 2024-01-03 PROCEDURE — 25810000003 SODIUM CHLORIDE 0.9 % SOLUTION: Performed by: STUDENT IN AN ORGANIZED HEALTH CARE EDUCATION/TRAINING PROGRAM

## 2024-01-03 PROCEDURE — 25010000002 CEFTRIAXONE PER 250 MG: Performed by: STUDENT IN AN ORGANIZED HEALTH CARE EDUCATION/TRAINING PROGRAM

## 2024-01-03 PROCEDURE — 80053 COMPREHEN METABOLIC PANEL: CPT | Performed by: INTERNAL MEDICINE

## 2024-01-03 PROCEDURE — 94664 DEMO&/EVAL PT USE INHALER: CPT

## 2024-01-03 PROCEDURE — 99223 1ST HOSP IP/OBS HIGH 75: CPT | Performed by: INTERNAL MEDICINE

## 2024-01-03 PROCEDURE — 25010000002 PIPERACILLIN SOD-TAZOBACTAM PER 1 G: Performed by: STUDENT IN AN ORGANIZED HEALTH CARE EDUCATION/TRAINING PROGRAM

## 2024-01-03 PROCEDURE — 82607 VITAMIN B-12: CPT | Performed by: STUDENT IN AN ORGANIZED HEALTH CARE EDUCATION/TRAINING PROGRAM

## 2024-01-03 PROCEDURE — 84165 PROTEIN E-PHORESIS SERUM: CPT | Performed by: STUDENT IN AN ORGANIZED HEALTH CARE EDUCATION/TRAINING PROGRAM

## 2024-01-03 PROCEDURE — 94640 AIRWAY INHALATION TREATMENT: CPT

## 2024-01-03 RX ORDER — NITROGLYCERIN 0.4 MG/1
0.4 TABLET SUBLINGUAL
Status: DISCONTINUED | OUTPATIENT
Start: 2024-01-03 | End: 2024-01-07 | Stop reason: HOSPADM

## 2024-01-03 RX ORDER — TORSEMIDE 20 MG/1
20 TABLET ORAL DAILY
Status: DISCONTINUED | OUTPATIENT
Start: 2024-01-03 | End: 2024-01-05

## 2024-01-03 RX ORDER — ONDANSETRON 2 MG/ML
4 INJECTION INTRAMUSCULAR; INTRAVENOUS EVERY 6 HOURS PRN
Status: DISCONTINUED | OUTPATIENT
Start: 2024-01-03 | End: 2024-01-07 | Stop reason: HOSPADM

## 2024-01-03 RX ORDER — IPRATROPIUM BROMIDE AND ALBUTEROL SULFATE 2.5; .5 MG/3ML; MG/3ML
3 SOLUTION RESPIRATORY (INHALATION) EVERY 4 HOURS PRN
Status: DISCONTINUED | OUTPATIENT
Start: 2024-01-03 | End: 2024-01-04 | Stop reason: SDUPTHER

## 2024-01-03 RX ORDER — SODIUM CHLORIDE 9 MG/ML
40 INJECTION, SOLUTION INTRAVENOUS AS NEEDED
Status: DISCONTINUED | OUTPATIENT
Start: 2024-01-03 | End: 2024-01-07 | Stop reason: HOSPADM

## 2024-01-03 RX ORDER — VANCOMYCIN/0.9 % SOD CHLORIDE 1.5G/250ML
12.1 PLASTIC BAG, INJECTION (ML) INTRAVENOUS
Status: DISCONTINUED | OUTPATIENT
Start: 2024-01-03 | End: 2024-01-03

## 2024-01-03 RX ORDER — IPRATROPIUM BROMIDE AND ALBUTEROL SULFATE 2.5; .5 MG/3ML; MG/3ML
3 SOLUTION RESPIRATORY (INHALATION)
Status: DISCONTINUED | OUTPATIENT
Start: 2024-01-03 | End: 2024-01-04

## 2024-01-03 RX ORDER — ACETAMINOPHEN 325 MG/1
650 TABLET ORAL EVERY 4 HOURS PRN
Status: DISCONTINUED | OUTPATIENT
Start: 2024-01-03 | End: 2024-01-07 | Stop reason: HOSPADM

## 2024-01-03 RX ORDER — OXYBUTYNIN CHLORIDE 5 MG/1
5 TABLET ORAL DAILY
Status: DISCONTINUED | OUTPATIENT
Start: 2024-01-03 | End: 2024-01-07 | Stop reason: HOSPADM

## 2024-01-03 RX ORDER — LORAZEPAM 0.5 MG/1
0.5 TABLET ORAL EVERY 6 HOURS PRN
Status: DISCONTINUED | OUTPATIENT
Start: 2024-01-03 | End: 2024-01-07 | Stop reason: HOSPADM

## 2024-01-03 RX ORDER — CHOLECALCIFEROL (VITAMIN D3) 125 MCG
5 CAPSULE ORAL NIGHTLY PRN
Status: DISCONTINUED | OUTPATIENT
Start: 2024-01-03 | End: 2024-01-07 | Stop reason: HOSPADM

## 2024-01-03 RX ORDER — SODIUM CHLORIDE 0.9 % (FLUSH) 0.9 %
10 SYRINGE (ML) INJECTION AS NEEDED
Status: DISCONTINUED | OUTPATIENT
Start: 2024-01-03 | End: 2024-01-07 | Stop reason: HOSPADM

## 2024-01-03 RX ORDER — SODIUM CHLORIDE 9 MG/ML
75 INJECTION, SOLUTION INTRAVENOUS CONTINUOUS
Status: DISCONTINUED | OUTPATIENT
Start: 2024-01-03 | End: 2024-01-04

## 2024-01-03 RX ORDER — HYDROCODONE BITARTRATE AND ACETAMINOPHEN 5; 325 MG/1; MG/1
1 TABLET ORAL EVERY 4 HOURS PRN
Status: DISCONTINUED | OUTPATIENT
Start: 2024-01-03 | End: 2024-01-07 | Stop reason: HOSPADM

## 2024-01-03 RX ORDER — ACETAMINOPHEN 500 MG
1000 TABLET ORAL ONCE
Status: COMPLETED | OUTPATIENT
Start: 2024-01-03 | End: 2024-01-03

## 2024-01-03 RX ORDER — SODIUM CHLORIDE 0.9 % (FLUSH) 0.9 %
10 SYRINGE (ML) INJECTION EVERY 12 HOURS SCHEDULED
Status: DISCONTINUED | OUTPATIENT
Start: 2024-01-03 | End: 2024-01-07 | Stop reason: HOSPADM

## 2024-01-03 RX ADMIN — SODIUM CHLORIDE 1000 ML: 9 INJECTION, SOLUTION INTRAVENOUS at 00:21

## 2024-01-03 RX ADMIN — TORSEMIDE 20 MG: 20 TABLET ORAL at 09:33

## 2024-01-03 RX ADMIN — PIPERACILLIN SODIUM AND TAZOBACTAM SODIUM 4.5 G: 4; .5 INJECTION, SOLUTION INTRAVENOUS at 00:21

## 2024-01-03 RX ADMIN — SODIUM CHLORIDE 75 ML/HR: 9 INJECTION, SOLUTION INTRAVENOUS at 02:02

## 2024-01-03 RX ADMIN — OXYBUTYNIN CHLORIDE 5 MG: 5 TABLET ORAL at 09:34

## 2024-01-03 RX ADMIN — VANCOMYCIN HYDROCHLORIDE 2500 MG: 10 INJECTION, POWDER, LYOPHILIZED, FOR SOLUTION INTRAVENOUS at 02:03

## 2024-01-03 RX ADMIN — CEFTRIAXONE 2000 MG: 2 INJECTION, POWDER, FOR SOLUTION INTRAMUSCULAR; INTRAVENOUS at 16:27

## 2024-01-03 RX ADMIN — PIPERACILLIN SODIUM AND TAZOBACTAM SODIUM 4.5 G: 4; .5 INJECTION, SOLUTION INTRAVENOUS at 09:34

## 2024-01-03 RX ADMIN — SERTRALINE HYDROCHLORIDE 50 MG: 50 TABLET ORAL at 09:33

## 2024-01-03 RX ADMIN — RIVAROXABAN 20 MG: 20 TABLET, FILM COATED ORAL at 11:22

## 2024-01-03 RX ADMIN — IPRATROPIUM BROMIDE AND ALBUTEROL SULFATE 3 ML: 2.5; .5 SOLUTION RESPIRATORY (INHALATION) at 19:44

## 2024-01-03 RX ADMIN — ACETAMINOPHEN 1000 MG: 500 TABLET ORAL at 00:20

## 2024-01-03 RX ADMIN — IPRATROPIUM BROMIDE AND ALBUTEROL SULFATE 3 ML: 2.5; .5 SOLUTION RESPIRATORY (INHALATION) at 07:11

## 2024-01-03 RX ADMIN — IOPAMIDOL 80 ML: 755 INJECTION, SOLUTION INTRAVENOUS at 00:34

## 2024-01-03 RX ADMIN — Medication 10 ML: at 02:02

## 2024-01-03 RX ADMIN — SODIUM CHLORIDE 75 ML/HR: 9 INJECTION, SOLUTION INTRAVENOUS at 21:18

## 2024-01-03 RX ADMIN — IPRATROPIUM BROMIDE AND ALBUTEROL SULFATE 3 ML: 2.5; .5 SOLUTION RESPIRATORY (INHALATION) at 12:41

## 2024-01-03 NOTE — PLAN OF CARE
Goal Outcome Evaluation:  Plan of Care Reviewed With: patient, spouse           Outcome Evaluation: Pt presents with situational confusion and mild balance deficits contributing to impairments in ambulation and overall fxnl mobility. Pt will benefit from PT to address aforementioned deficits and return to PLOF. PT rec home with assist upon dc.      Anticipated Discharge Disposition (PT): home with assist

## 2024-01-03 NOTE — CASE MANAGEMENT/SOCIAL WORK
Discharge Planning Assessment  [facility]     Patient Name: Favian Arellano  MRN: [id number]  Today's Date: 1/3/2024    Admit Date: 1/2/2024    Plan: Home   Discharge Needs Assessment       Row Name 01/03/24 1548       Living Environment    People in Home spouse    Name(s) of People in Home Rafaela Arellano  Spouse  [phone]                   Discharge Plan       Row Name 01/03/24 1632       Plan    Plan Home    Patient/Family in Agreement with Plan yes    Plan Comments Spoke with Mr. Arellano and spouse in his room, to initiate discharge planning. He lives with his wife in [facility]. He verified he has Medicare/[employer] insurance. He has prescription coverage and uses [facility] in [place] to get his prescriptions filled. His PCP is Hermes Chavez. Prior to admission, he was independent with ADL's. He uses a CPAP at night, that he got from  [employer]. No other DME. He is not current with home health. His goal is home at discharge. Await therapy recommendations to determine proper discharge placement. CM will continue to follow.    Final Discharge Disposition Code 30 - still a patient                  Continued Care and Services - Admitted Since 1/2/2024    Coordination has not been started for this encounter.       Expected Discharge Date and Time       Expected Discharge Date Expected Discharge Time    Jan 5, 2024            Demographic Summary       Row Name 01/03/24 1547       General Information    Admission Type inpatient    Arrived From emergency department    Referral Source admission list    Reason for Consult discharge planning    Preferred Language English       Contact Information    Permission Granted to Share Info With     Contact Information Obtained for                    Functional Status       Row Name 01/03/24 1548       Functional Status    Usual Activity Tolerance moderate    Current Activity Tolerance moderate       Functional Status, IADL     Medications independent    Meal Preparation independent    Housekeeping independent    Laundry independent    Shopping independent                   Psychosocial    No documentation.                  Abuse/Neglect    No documentation.                  Legal    No documentation.                  Substance Abuse    No documentation.                  Patient Forms    No documentation.                     Siria Hinton RN

## 2024-01-03 NOTE — PROGRESS NOTES
[facility] Medicine Services  PROGRESS NOTE    Patient Name: Favian Arellano  : 1945  MRN: [id number]    Date of Admission: 2024  Primary Care Physician: Saleem Yang MD    Subjective   Subjective     CC:  Confusion, ataxia    HPI:  Evaluated patient this morning. Patient is a poor historian, wife able to provide history. Has had intermittent confusion over the past few days which is new. Also had difficulty walking with a shuffling gait prior to coming to the hospital.      Objective   Objective     Vital Signs:   Temp:  [98.2 °F (36.8 °C)-99.8 °F (37.7 °C)] 98.3 °F (36.8 °C)  Heart Rate:  [] 103  Resp:  [16-20] 16  BP: (131-192)/() 131/82     Physical Exam:  Constitutional: Awake, alert, resting comfortably  HENT: NCAT, mucous membranes moist  Respiratory: Clear to auscultation bilaterally, respiratory effort normal   Cardiovascular: RRR, no murmurs, rubs, or gallops  Gastrointestinal: Positive bowel sounds, soft, nontender, nondistended  Musculoskeletal: No bilateral ankle edema  Neurologic: Alert and oriented x 3, no focal deficits, speech clear. 5/5 in muscle strength of bilateral upper and lower extremities. Did not evaluate gait.  Skin: No rashes      Results Reviewed:  LAB RESULTS:      Lab 24  0402 24   WBC 11.00* 13.08*   HEMOGLOBIN 12.9* 15.1   HEMATOCRIT 37.9 43.5   PLATELETS 215 251   NEUTROS ABS 8.03* 10.77*   IMMATURE GRANS (ABS) 0.06* 0.06*   LYMPHS ABS 1.07 0.64*   MONOS ABS 1.78* 1.57*   EOS ABS 0.03 0.01   MCV 96.7 95.6   CRP  --  19.37*   PROCALCITONIN  --  0.31*   LACTATE  --  1.5         Lab 24  0402 24   SODIUM 134* 133*   POTASSIUM 4.2 4.0   CHLORIDE 99 96*   CO2 25.0 23.0   ANION GAP 10.0 14.0   BUN 17 15   CREATININE 0.89 0.92   EGFR 88.3 85.7   GLUCOSE 121* 119*   CALCIUM 8.3* 8.8   MAGNESIUM 2.3 2.3   PHOSPHORUS  --  2.3*   TSH  --  0.977         Lab 24  0402 24  1920   TOTAL  PROTEIN 5.4* 7.2   ALBUMIN 3.4* 4.0   GLOBULIN 2.0 3.2   ALT (SGPT) 11 14   AST (SGOT) 26 27   BILIRUBIN 2.3* 2.4*   ALK PHOS 70 89   LIPASE  --  31         Lab 01/02/24 1920   HSTROP T 22*                 Lab 01/02/24 2134   PH, ARTERIAL 7.463*   PCO2, ARTERIAL 35.8   PO2 ART 70.8*   FIO2 21   HCO3 ART 25.6   BASE EXCESS ART 2.1*   CARBOXYHEMOGLOBIN 2.0     Brief Urine Lab Results  (Last result in the past 365 days)        Color   Clarity   Blood   Leuk Est   Nitrite   Protein   CREAT   Urine HCG        01/02/24 1922 Yellow   Clear   Small (1+)   Trace   Negative   30 mg/dL (1+)                   Microbiology Results Abnormal       Procedure Component Value - Date/Time    MRSA Screen, PCR (Inpatient) - Swab, Nares [[id number]]  (Normal) Collected: 01/03/24 0943    Lab Status: Final result Specimen: Swab from Nares Updated: 01/03/24 1238     MRSA PCR Negative    Narrative:      The negative predictive value of this diagnostic test is high and should only be used to consider de-escalating anti-MRSA therapy. A positive result may indicate colonization with MRSA and must be correlated clinically.  MRSA Negative    Legionella Antigen, Urine - Urine, Urine, Clean Catch [[id number]]  (Normal) Collected: 01/02/24 1922    Lab Status: Final result Specimen: Urine, Clean Catch Updated: 01/03/24 0945     LEGIONELLA ANTIGEN, URINE Negative    S. Pneumo Ag Urine or CSF - Urine, Urine, Clean Catch [[id number]]  (Normal) Collected: 01/02/24 1922    Lab Status: Final result Specimen: Urine, Clean Catch Updated: 01/03/24 0945     Strep Pneumo Ag Negative            CT Angiogram Chest    Result Date: 1/3/2024  CT ANGIOGRAM CHEST Date of Exam: 1/3/2024 12:26 AM EST Indication: dyspnea, tachycardia, 2 weeks productive cough and infectious labs concerning.. Comparison: Chest radiograph 1/2/2024. Technique: CTA of the chest was performed after the uneventful intravenous administration of 85 mL of Isovue-370. Reconstructed coronal and  sagittal images were also obtained. In addition, a 3-D volume rendered image was created for interpretation. Automated exposure control and iterative reconstruction methods were used. Findings: There is mild multifocal linear scarring in the lungs. There is patchy consolidated pneumonia in the dependent portion of the right lower lobe. There is associated peribronchial thickening. There is mild subpleural scarring at multiple locations in both lungs. Central airways are patent. There is segmentally obstructive endobronchial debris in the right lower lobe. No pneumothorax. There is a small right-sided pleural effusion. The thyroid, trachea and esophagus appear within normal limits. The heart is mildly enlarged. There are severe coronary artery calcifications. There is mild aortic atherosclerosis. No aneurysm. There is no evidence of pulmonary embolism. No pericardial effusion. There is a reactive right hilar lymph node measuring up to 19 mm short axis. No acute findings in the superficial soft tissues. There is mild gynecomastia. There is a mildly calcified splenic artery aneurysm at the pancreatic tail measuring 15 mm diameter. There is a solitary gallstone without acute cholecystitis.     Impression: Impression: 1.No evidence of pulmonary embolism. 2.Right lower lobe pneumonia and small right-sided pleural effusion. 3.Mild cardiomegaly and severe coronary artery calcification. 4.Cholelithiasis without evidence of acute cholecystitis. 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 Electronically Signed: Mayco Coelho MD  1/3/2024 1:02 AM EST  Workstation ID: [id number]    MRI Brain Without Contrast    Result Date: 1/2/2024  MRI BRAIN WO CONTRAST Date of Exam: 1/2/2024 10:25 PM EST Indication: 3 weeks ataxia, encephalopathy.  Comparison: None available. Technique:  Routine multiplanar/multisequence sequence images of the brain were obtained without contrast administration. Findings: Severely motion degraded study. No acute infarct.  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 No definite intracranial hemorrhage, although evaluation is degraded by motion. No mass effect, edema or midline shift Moderate periventricular and subcortical white matter T2/FLAIR hyperintensities, nonspecific but most likely represents chronic small vessel ischemic changes. No extra-axial fluid collection. Prominent ventricular system secondary to chronic parenchymal volume loss. Superimposed normal pressure hydrocephalus is not completely excluded. Status post bilateral lens extraction. Otherwise the orbits are grossly unremarkable Mucosal thickening of the left maxillary sinus. Otherwise clear The visualized soft tissues are unremarkable. No acute osseous abnormality.     Impression: Impression: No acute infarct or intracranial hemorrhage. Please note that the study is severely motion degraded which limits the evaluation for subtle findings. Moderate periventricular and subcortical T2/FLAIR hyperintensities, nonspecific but most likely represents chronic small vessel ischemic changes. Possible small old infarct in the right occipital lobe. Diffuse parenchymal volume loss. Superimposed normal pressure hydrocephalus is not excluded. Electronically Signed: Morro Koenig DO  1/2/2024 10:51 PM EST  Workstation ID: [id number]    XR Chest 1 View    Result Date: 1/2/2024  XR CHEST 1 VW Date of Exam: 1/2/2024 7:01 PM EST Indication: Weak/Dizzy/AMS triage protocol Comparison: 7/31/2021 Findings: Mild cardiomegaly is stable. There is linear scarring or atelectasis within the left upper lobe which is unchanged. Calcified granulomas are seen within the left lung base. There is no new airspace consult effusion or pneumothorax. Bony structures are unremarkable.     Impression: Impression: 1. Stable cardiomegaly. 2. No acute cardiopulmonary disease. Electronically Signed: Charles Pulido MD  1/2/2024 7:10 PM EST  Workstation ID:  [id number]     Results for orders placed during the hospital encounter of 10/26/23    Adult Transthoracic Echo Complete w/ Color, Spectral and Contrast if necessary per protocol    Interpretation Summary    Left ventricular systolic function is normal. Estimated left ventricular EF = 55%    Left ventricular diastolic function is consistent with (grade I) impaired relaxation.    Trace mitral regurgitation.    Trace to mild tricuspid regurgitation.    Calculated right ventricular systolic pressure from tricuspid regurgitation is 17 mmHg.      Current medications:  Scheduled Meds:cefTRIAXone, 2,000 mg, Intravenous, Q24H  ipratropium-albuterol, 3 mL, Nebulization, Q6H - RT  oxybutynin, 5 mg, Oral, Daily  rivaroxaban, 20 mg, Oral, Daily  sertraline, 50 mg, Oral, Daily  sodium chloride, 10 mL, Intravenous, Q12H  torsemide, 20 mg, Oral, Daily      Continuous Infusions:sodium chloride, 75 mL/hr, Last Rate: 75 mL/hr (01/03/24 0202)      PRN Meds:.  acetaminophen    Calcium Replacement - Follow Nurse / BPA Driven Protocol    HYDROcodone-acetaminophen    ipratropium-albuterol    LORazepam    Magnesium Standard Dose Replacement - Follow Nurse / BPA Driven Protocol    melatonin    nitroglycerin    ondansetron    Phosphorus Replacement - Follow Nurse / BPA Driven Protocol    Potassium Replacement - Follow Nurse / BPA Driven Protocol    sodium chloride    sodium chloride    Assessment & Plan   Assessment & Plan     Active Hospital Problems    Diagnosis  POA    **Ataxia [R27.0]  Yes      Resolved Hospital Problems   No resolved problems to display.        Brief Hospital Course to date:  Favian Arellano is a 78 y.o. male with past medical history of permanent atrial fibrillation, hypertension, PARKER, tachybradycardia syndrome who presented with complaints of confusion and ataxia. Was found to be positive for rhinovirus/enterovirus with right lower lobe pneumonia on admission.    This patient's problems and plans were partially  entered by my partner and updated as appropriate by me 01/03/24.    RLL pneumonia  Rhinovirus/enterovirus  Leukocytosis  -Respiratory PCR positive result for human rhinovirus/enterovirus.  -CT chest with evidence of right lower lobe pneumonia  -Legionella and strep pneumo urine antigens negative. MRSA probe negative.  -Vancomycin and Zosyn started in the ED   -De-escalated antibiotics to just IV ceftriaxone, given negative MRSA probe. Continue DuoNeb treatments q6 hours. Blood cultures x 2 in process. Sputum culture pending. Daily CBC.     Confusion, with ataxia and hx urinary incontinence  -Likely multifactorial, confusion could be explained by active infection, may also have early cognitive decline; urinary incontinence could be secondary to prostate cancer and oxybutynin  -MRI brain without acute stroke, with diffuse parenchymal volume loss  -Neurology evaluated, recommended outpatient EMG/NCS. Will check B12, folate, A1c, SPEP. Follow-up with outpatient neurology in 1 month.     Permanent atrial fibrillation  -No antiarrhythmics along his home regimen; can add on as needed. Follows with cardiology.  -Continue Xarelto from home regimen.  -Continue telemetry.    Hypertension  -Continue home torsemide. Follows with cardiology.    Obstructive sleep apnea  -He may use his home CPAP machine or can be provided a machine to use here by respiratory therapy.    Anxiety/depression  -Continue home sertraline.     Prostate cancer   -Under active surveillance as outpatient. Continue outpatient follow-up with urology.    Expected Discharge Location and Transportation: Likely home  Expected Discharge   Expected Discharge Date: 1/5/2024; Expected Discharge Time:      DVT prophylaxis:  Medical DVT prophylaxis orders are present.     AM-PAC 6 Clicks Score (PT): 20 (01/03/24 1141)    CODE STATUS:   Code Status and Medical Interventions:   Ordered at: 01/03/24 0042     Level Of Support Discussed With:    Patient     Code Status  (Patient has no pulse and is not breathing):    CPR (Attempt to Resuscitate)     Medical Interventions (Patient has pulse or is breathing):    Full Support       Kylie Schroeder,   01/03/24
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
## 2024-01-03 NOTE — THERAPY EVALUATION
Acute Care - Speech Language Pathology   Swallow Initial Evaluation [facility]  Clinical Swallow Evaluation      Patient Name: Favian Arellano  : 1945  MRN: [id number]  Today's Date: 1/3/2024               Admit Date: 2024    Visit Dx:     ICD-10-CM ICD-9-CM   1. Normal pressure hydrocephalus  G91.2 331.5   2. Pneumonia of right lower lobe due to infectious organism  J18.9 486   3. Confusion  R41.0 298.9   4. Permanent atrial fibrillation  I48.21 427.31   5. Mild obesity  E66.9 278.00   6. Essential hypertension  I10 401.9     Patient Active Problem List   Diagnosis    Permanent atrial fibrillation    Tachy-krishna syndrome    Essential hypertension    Lower extremity edema    PARKER (obstructive sleep apnea)    Osteoarthritis    Mild obesity    MG, ocular (myasthenia gravis)    Pain    Hematoma of right thigh    S/P Debridement irrigation and partial closure of cutaneous tissue right knee    Pneumonia    A-fib    Hyperbilirubinemia    Ataxia     Past Medical History:   Diagnosis Date    Benign hypertension     History of eye surgery     Lower extremity edema     Mild obesity     PARKER (obstructive sleep apnea)     requiring CPAP    Osteoarthritis     Paroxysmal atrial fibrillation     Tachy-krishna syndrome      Past Surgical History:   Procedure Laterality Date    CARDIOVERSION      HERNIA REPAIR      INCISION AND DRAINAGE LEG Right 2017    Procedure: INCISION AND DRAINAGE RIGHT KNEE;  Surgeon: Francisco Macias MD;  Location: [facility];  Service:     JOINT REPLACEMENT      NEPHROLITHOTRIPSY PERCUTANEOUS         SLP Recommendation and Plan  SLP Swallowing Diagnosis: suspected pharyngeal dysphagia (24 111)  SLP Diet Recommendation: other (see comments) (will defer diet decision to MD @ this time) (24 111)  Recommended Precautions and Strategies: general aspiration precautions (24 111)  SLP Rec. for Method of Medication Administration: as tolerated (24)     Monitor  for Signs of Aspiration: notify SLP if any concerns (01/03/24 1115)  Recommended Diagnostics: other (see comments) (rec'd MBS but pt/spouse declined @ this time) (01/03/24 1115)  Swallow Criteria for Skilled Therapeutic Interventions Met: patient/family declined skilled intervention at this time, other (see comments) (will f/u tomorrow to further discuss) (01/03/24 1115)  Anticipated Discharge Disposition (SLP): home with OP services (01/03/24 1115)        Predicted Duration Therapy Intervention (Days): until discharge (01/03/24 1115)  Oral Care Recommendations: Oral Care BID/PRN, Toothbrush (01/03/24 1115)                                      Oral Care Recommendations: Oral Care BID/PRN, Toothbrush (01/03/24 1115)    Plan of Care Reviewed With: spouse      SWALLOW EVALUATION (last 72 hours)       SLP Adult Swallow Evaluation       Row Name 01/03/24 1115                   Rehab Evaluation    Document Type evaluation  -MP        Subjective Information no complaints  -MP        Patient Observations alert;cooperative  -MP        Patient/Family/Caregiver Comments/Observations S/o present  -MP        Patient Effort good  -MP           General Information    Patient Profile Reviewed yes  -MP        Pertinent History Of Current Problem Adm 1/2 w/ RLL PNA. Confusion, loss of balance, gait issues. MRI w/ no acute, possible small old infarct in R occipital lobe. Hx sleep apnea, Afib, prostate ca.  -MP        Current Method of Nutrition regular textures;thin liquids  -MP        Precautions/Limitations, Vision WFL  -MP        Precautions/Limitations, Hearing WFL  -MP        Prior Level of Function-Communication WFL  -MP        Prior Level of Function-Swallowing no diet consistency restrictions  -MP        Plans/Goals Discussed with patient;spouse/S.O.;agreed upon  -MP        Barriers to Rehab none identified  -MP        Patient's Goals for Discharge patient did not state  -MP        Family Goals for Discharge family did not  state  -MP           Pain    Additional Documentation Pain Scale: FACES Pre/Post-Treatment (Group)  -MP           Pain Scale: FACES Pre/Post-Treatment    Pain: FACES Scale, Pretreatment 0-->no hurt  -MP        Posttreatment Pain Rating 0-->no hurt  -MP           Oral Motor Structure and Function    Dentition Assessment natural, present and adequate  -MP        Secretion Management WNL/WFL  -MP        Mucosal Quality moist, healthy  -MP           Oral Musculature and Cranial Nerve Assessment    Oral Motor General Assessment WFL  -MP           General Eating/Swallowing Observations    Respiratory Support Currently in Use room air  -MP        Eating/Swallowing Skills self-fed;fed by SLP  -MP        Positioning During Eating upright in bed  -MP        Utensils Used spoon;cup;straw  -MP        Consistencies Trialed thin liquids;pureed;regular textures  -MP           Clinical Swallow Eval    Pharyngeal Phase suspected pharyngeal impairment  -MP        Clinical Swallow Evaluation Summary Intermittent coughing t/o PO. Difficult to determine if r/t PO. Discussed w/ pt & spouse completing MBS to r/o aspiration. However, pt declined MBS @ this time. Notified RN & MD. SLP will f/u.  -MP           Pharyngeal Phase Concerns    Pharyngeal Phase Concerns cough  -MP        Cough other (see comments)  t/o PO  -MP           SLP Evaluation Clinical Impression    SLP Swallowing Diagnosis suspected pharyngeal dysphagia  -MP        Functional Impact risk of aspiration/pneumonia  -MP        Swallow Criteria for Skilled Therapeutic Interventions Met patient/family declined skilled intervention at this time;other (see comments)  will f/u tomorrow to further discuss  -MP           Recommendations    Predicted Duration Therapy Intervention (Days) until discharge  -MP        SLP Diet Recommendation other (see comments)  will defer diet decision to MD @ this time  -MP        Recommended Diagnostics other (see comments)  rec'd MBS but pt/spouse  declined @ this time  -MP        Recommended Precautions and Strategies general aspiration precautions  -MP        Oral Care Recommendations Oral Care BID/PRN;Toothbrush  -MP        SLP Rec. for Method of Medication Administration as tolerated  -MP        Monitor for Signs of Aspiration notify SLP if any concerns  -MP        Anticipated Discharge Disposition (SLP) home with OP services  -MP                  User Key  (r) = Recorded By, (t) = Taken By, (c) = Cosigned By      Initials Name Effective Dates     Storm Dinero MS CCC-SLP 12/28/21 -                     EDUCATION  The patient has been educated in the following areas:   Dysphagia (Swallowing Impairment).              Time Calculation:    Time Calculation- SLP       Row Name 01/03/24 1317             Time Calculation- SLP    SLP Start Time 1145  -MP      SLP Received On 01/03/24  -MP         Untimed Charges    04800-SE Eval Oral Pharyng Swallow Minutes 40  -MP         Total Minutes    Untimed Charges Total Minutes 40  -MP       Total Minutes 40  -MP                User Key  (r) = Recorded By, (t) = Taken By, (c) = Cosigned By      Initials Name Provider Type    MP Storm Dinero MS CCC-SLP Speech and Language Pathologist                    Therapy Charges for Today       Code Description Service Date Service Provider Modifiers Qty    [id number] HC ST EVAL ORAL PHARYNG SWALLOW 3 1/3/2024 Storm Dinero MS CCC-SLP GN 1                 Storm Pop MS CCC-CB  1/3/2024

## 2024-01-03 NOTE — ED NOTES
Favian Arellano    Nursing Report ED to Floor:  Mental status: GCS14  Ambulatory status: up with 1 assist  Oxygen Therapy:  2L  Cardiac Rhythm: afib/tachy  Admitted from: ed  Safety Concerns:  none  Social Issues: none  ED Room #:  10    ED Nurse Phone Extension - [phone] or may call [phone].      HPI:   Chief Complaint   Patient presents with    Altered Mental Status       Past Medical History:  Past Medical History:   Diagnosis Date    Benign hypertension     History of eye surgery     Lower extremity edema     Mild obesity     PARKER (obstructive sleep apnea)     requiring CPAP    Osteoarthritis     Paroxysmal atrial fibrillation     Tachy-krishna syndrome         Past Surgical History:  Past Surgical History:   Procedure Laterality Date    CARDIOVERSION      HERNIA REPAIR      INCISION AND DRAINAGE LEG Right 8/18/2017    Procedure: INCISION AND DRAINAGE RIGHT KNEE;  Surgeon: Francisco Macias MD;  Location: [facility] OR;  Service:     JOINT REPLACEMENT      NEPHROLITHOTRIPSY PERCUTANEOUS          Admitting Doctor:   Fernie Jacob III, DO    Consulting Provider(s):  Consults       Date and Time Order Name Status Description    1/3/2024 12:42 AM Inpatient Neurology Consult General               Admitting Diagnosis:   There were no encounter diagnoses.    Most Recent Vitals:   Vitals:    01/02/24 2200 01/02/24 2334 01/02/24 2351 01/03/24 0000   BP:  (!) 171/115 170/91 (!) 165/101   BP Location:       Patient Position:       Pulse: 103 113 107 115   Resp:       Temp:   99.8 °F (37.7 °C)    TempSrc:   Oral    SpO2:  96% 97% 97%   Weight:       Height:           Active LDAs/IV Access:   Lines, Drains & Airways       Active LDAs       Name Placement date Placement time Site Days    Peripheral IV 01/02/24 1933 Left;Posterior Forearm 01/02/24 1933  Forearm  less than 1                    Labs (abnormal labs have a star):   Labs Reviewed   RESPIRATORY PANEL PCR W/ COVID-19 (SARS-COV-2), NP SWAB IN UTM/VTP, 2 HR TAT -  Abnormal; Notable for the following components:       Result Value    Human Rhinovirus/Enterovirus Detected (*)     All other components within normal limits    Narrative:     In the setting of a positive respiratory panel with a viral infection PLUS a negative procalcitonin without other underlying concern for bacterial infection, consider observing off antibiotics or discontinuation of antibiotics and continue supportive care. If the respiratory panel is positive for atypical bacterial infection (Bordetella pertussis, Chlamydophila pneumoniae, or Mycoplasma pneumoniae), consider antibiotic de-escalation to target atypical bacterial infection.   COMPREHENSIVE METABOLIC PANEL - Abnormal; Notable for the following components:    Glucose 119 (*)     Sodium 133 (*)     Chloride 96 (*)     Total Bilirubin 2.4 (*)     All other components within normal limits    Narrative:     GFR Normal >60  Chronic Kidney Disease <60  Kidney Failure <15    The GFR formula is only valid for adults with stable renal function between ages 18 and 70.   SINGLE HSTROPONIN T - Abnormal; Notable for the following components:    HS Troponin T 22 (*)     All other components within normal limits    Narrative:     High Sensitive Troponin T Reference Range:  <14.0 ng/L- Negative Female for AMI  <22.0 ng/L- Negative Male for AMI  >=14 - Abnormal Female indicating possible myocardial injury.  >=22 - Abnormal Male indicating possible myocardial injury.   Clinicians would have to utilize clinical acumen, EKG, Troponin, and serial changes to determine if it is an Acute Myocardial Infarction or myocardial injury due to an underlying chronic condition.        URINALYSIS W/ MICROSCOPIC IF INDICATED (NO CULTURE) - Abnormal; Notable for the following components:    Ketones, UA Trace (*)     Blood, UA Small (1+) (*)     Protein, UA 30 mg/dL (1+) (*)     Leuk Esterase, UA Trace (*)     All other components within normal limits   CBC WITH AUTO DIFFERENTIAL -  "Abnormal; Notable for the following components:    WBC 13.08 (*)     MCH 33.2 (*)     Neutrophil % 82.3 (*)     Lymphocyte % 4.9 (*)     Eosinophil % 0.1 (*)     Neutrophils, Absolute 10.77 (*)     Lymphocytes, Absolute 0.64 (*)     Monocytes, Absolute 1.57 (*)     Immature Grans, Absolute 0.06 (*)     All other components within normal limits   PROCALCITONIN - Abnormal; Notable for the following components:    Procalcitonin 0.31 (*)     All other components within normal limits    Narrative:     As a Marker for Sepsis (Non-Neonates):    1. <0.5 ng/mL represents a low risk of severe sepsis and/or septic shock.  2. >2 ng/mL represents a high risk of severe sepsis and/or septic shock.    As a Marker for Lower Respiratory Tract Infections that require antibiotic therapy:    PCT on Admission    Antibiotic Therapy       6-12 Hrs later    >0.5                Strongly Recommended  >0.25 - <0.5        Recommended   0.1 - 0.25          Discouraged              Remeasure/reassess PCT  <0.1                Strongly Discouraged     Remeasure/reassess PCT    As 28 day mortality risk marker: \"Change in Procalcitonin Result\" (>80% or <=80%) if Day 0 (or Day 1) and Day 4 values are available. Refer to http://[email or web address]-pct-[email or web address]    Change in PCT <=80%  A decrease of PCT levels below or equal to 80% defines a positive change in PCT test result representing a higher risk for 28-day all-cause mortality of patients diagnosed with severe sepsis for septic shock.    Change in PCT >80%  A decrease of PCT levels of more than 80% defines a negative change in PCT result representing a lower risk for 28-day all-cause mortality of patients diagnosed with severe sepsis or septic shock.      C-REACTIVE PROTEIN - Abnormal; Notable for the following components:    C-Reactive Protein 19.37 (*)     All other components within normal limits   PHOSPHORUS - Abnormal; Notable for the following components:    Phosphorus 2.3 (*)     All other " components within normal limits   URINALYSIS, MICROSCOPIC ONLY - Abnormal; Notable for the following components:    RBC, UA 6-10 (*)     All other components within normal limits   BLOOD GAS, ARTERIAL W/CO-OXIMETRY - Abnormal; Notable for the following components:    pH, Arterial 7.463 (*)     pO2, Arterial 70.8 (*)     Base Excess, Arterial 2.1 (*)     Oxyhemoglobin 93.8 (*)     pO2, Temperature Corrected 70.8 (*)     All other components within normal limits   MAGNESIUM - Normal   LIPASE - Normal   LACTIC ACID, PLASMA - Normal   SALICYLATE LEVEL - Normal   URINE DRUG SCREEN - Normal    Narrative:     Cutoff For Drugs Screened:    Amphetamines               500 ng/ml  Barbiturates               200 ng/ml  Benzodiazepines            150 ng/ml  Cocaine                    150 ng/ml  Methadone                  200 ng/ml  Opiates                    100 ng/ml  Phencyclidine               25 ng/ml  THC                         50 ng/ml  Methamphetamine            500 ng/ml  Tricyclic Antidepressants  300 ng/ml  Oxycodone                  100 ng/ml  Buprenorphine               10 ng/ml    The normal value for all drugs tested is negative. This report includes unconfirmed screening results, with the cutoff values listed, to be used for medical treatment purposes only.  Unconfirmed results must not be used for non-medical purposes such as employment or legal testing.  Clinical consideration should be applied to any drug of abuse test, particularly when unconfirmed results are used.     ETHANOL - Normal    Narrative:     Elevated lactic acid concentration and lactate dehydrogenase(LD) activity may falsely elevate enzymatically determined ethanol levels. Not for legal purposes.    ACETAMINOPHEN LEVEL - Normal   TSH - Normal   T4, FREE - Normal    Narrative:     Results may be falsely increased if patient taking Biotin.     FENTANYL, URINE - Normal    Narrative:     Negative Threshold:      Fentanyl 5 ng/mL     The normal value  for the drug tested is negative. This report includes final unconfirmed screening results to be used for medical treatment purposes only. Unconfirmed results must not be used for non-medical purposes such as employment or legal testing. Clinical consideration should be applied to any drug of abuse test, particularly when unconfirmed results are used.          COVID PRE-OP / PRE-PROCEDURE SCREENING ORDER (NO ISOLATION)    Narrative:     The following orders were created for panel order COVID PRE-OP / PRE-PROCEDURE SCREENING ORDER (NO ISOLATION) - Swab, Nasopharynx.  Procedure                               Abnormality         Status                     ---------                               -----------         ------                     Respiratory Panel PCR w/...[[id number]]  Abnormal            Final result                 Please view results for these tests on the individual orders.   BLOOD CULTURE   BLOOD CULTURE   URINE CULTURE   RAINBOW DRAW    Narrative:     The following orders were created for panel order Duck Draw.  Procedure                               Abnormality         Status                     ---------                               -----------         ------                     Green Top (Gel)[[id number]]                                  Final result               Lavender Top[[id number]]                                     Final result               Gold Top - SST[[id number]]                                   Final result               Petersen Top[[id number]]                                         Final result               Light Blue Top[[id number]]                                   Final result                 Please view results for these tests on the individual orders.   BLOOD GAS, ARTERIAL   COMPREHENSIVE METABOLIC PANEL   CBC WITH AUTO DIFFERENTIAL   MAGNESIUM   CBC AND DIFFERENTIAL    Narrative:     The following orders were created for panel order CBC & Differential.  Procedure                                Abnormality         Status                     ---------                               -----------         ------                     CBC Auto Differential[[id number]]        Abnormal            Final result                 Please view results for these tests on the individual orders.   GREEN TOP   LAVENDER TOP   GOLD TOP - SST   GRAY TOP   LIGHT BLUE TOP       Meds Given in ED:   Medications   sodium chloride 0.9 % flush 10 mL (has no administration in time range)   vancomycin 2500 mg/500 mL 0.9% NS IVPB ([facility]) (has no administration in time range)   sodium chloride 0.9 % bolus 1,000 mL (1,000 mL Intravenous New Bag 1/3/24 0021)   sodium chloride 0.9 % flush 10 mL (has no administration in time range)   sodium chloride 0.9 % flush 10 mL (has no administration in time range)   sodium chloride 0.9 % infusion 40 mL (has no administration in time range)   nitroglycerin (NITROSTAT) SL tablet 0.4 mg (has no administration in time range)   sodium chloride 0.9 % infusion (has no administration in time range)   Potassium Replacement - Follow Nurse / BPA Driven Protocol (has no administration in time range)   Magnesium Standard Dose Replacement - Follow Nurse / BPA Driven Protocol (has no administration in time range)   Phosphorus Replacement - Follow Nurse / BPA Driven Protocol (has no administration in time range)   Calcium Replacement - Follow Nurse / BPA Driven Protocol (has no administration in time range)   acetaminophen (TYLENOL) tablet 650 mg (has no administration in time range)   HYDROcodone-acetaminophen (NORCO) 5-325 MG per tablet 1 tablet (has no administration in time range)   melatonin tablet 5 mg (has no administration in time range)   ondansetron (ZOFRAN) injection 4 mg (has no administration in time range)   ipratropium-albuterol (DUO-NEB) nebulizer solution 3 mL (has no administration in time range)   metoprolol tartrate (LOPRESSOR) injection 5 mg (5 mg Intravenous Given 1/2/24 3146)   sodium  chloride 0.9 % bolus 500 mL (0 mL Intravenous Stopped 1/2/24 2245)   piperacillin-tazobactam (ZOSYN) 4.5 g in iso-osmotic dextrose 100 mL IVPB (premix) (4.5 g Intravenous New Bag 1/3/24 0021)   acetaminophen (TYLENOL) tablet 1,000 mg (1,000 mg Oral Given 1/3/24 0020)   iopamidol (ISOVUE-370) 76 % injection 100 mL (80 mL Intravenous Given 1/3/24 0034)     sodium chloride, 75 mL/hr

## 2024-01-03 NOTE — PROGRESS NOTES
"Pharmacy Consult-Vancomycin Dosing  Favian Arellano is a  78 y.o. male receiving vancomycin therapy.     Indication: pneumonia  Consulting Provider: hospitalist  ID Consult: No    Goal AUC: 400 - 600 mg/L*hr    Current Antimicrobial Therapy  Anti-Infectives (From admission, onward)      Ordered     Dose/Rate Route Frequency Start Stop    01/03/24 0206  vancomycin IVPB 1500 mg in 0.9% NaCl (Premix) 500 mL        Ordering Provider: Surya Velez, PharmD   See [facility] for full Linked Orders Report.    12.1 mg/kg × 124 kg  333.3 mL/hr over 90 Minutes Intravenous Every 18 Hours 01/03/24 2000 01/10/24 1359    01/03/24 0157  piperacillin-tazobactam (ZOSYN) 4.5 g in iso-osmotic dextrose 100 mL IVPB (premix)        Ordering Provider: Fernie Jacob III, DO    4.5 g  over 4 Hours Intravenous Every 8 Hours 01/03/24 0800 01/08/24 0759    01/03/24 0157  Pharmacy to dose vancomycin        Ordering Provider: Fernie Jacob III, DO     Does not apply Continuous PRN 01/03/24 0156 01/10/24 0155    01/03/24 0002  vancomycin 2500 mg/500 mL 0.9% NS IVPB ([facility])        Ordering Provider: Win Thomas MD    20 mg/kg × 124 kg  over 150 Minutes Intravenous Once 01/03/24 0018      01/02/24 2342  piperacillin-tazobactam (ZOSYN) 4.5 g in iso-osmotic dextrose 100 mL IVPB (premix)        Ordering Provider: Win Thomas MD    4.5 g  over 30 Minutes Intravenous Once 01/02/24 2358 01/03/24 0051            Allergies  Allergies as of 01/02/2024 - Reviewed 01/02/2024   Allergen Reaction Noted    Naproxen  05/31/2016       Labs  Results from last 7 days   Lab Units 01/02/24  1920   BUN mg/dL 15   CREATININE mg/dL 0.92     Results from last 7 days   Lab Units 01/02/24  1920   WBC 10*3/mm3 13.08*       Evaluation of Dosing  Last Dose Received in the ED/Outside Facility:               Vancomycin 2500 mg IV 1/3 at 02:03  Is Patient on Dialysis or Renal Replacement:               No    Ht - 190.5 cm (75\")  Wt " - 124 kg (273 lb 5.9 oz)    Estimated Creatinine Clearance: 95.1 mL/min (by C-G formula based on SCr of 0.92 mg/dL).  Intake & Output (last 3 days)         12/31 0701 01/01 0700 01/01 0701 01/02 0700 01/02 0701 01/03 0700    IV Piggyback   500    Total Intake(mL/kg)   500 (4)    Net   +500                   Microbiology and Radiology  Microbiology Results (last 10 days)       Procedure Component Value - Date/Time    COVID PRE-OP / PRE-PROCEDURE SCREENING ORDER (NO ISOLATION) - Swab, Nasopharynx [[id number]]  (Abnormal) Collected: 01/02/24 1928    Lab Status: Final result Specimen: Swab from Nasopharynx Updated: 01/02/24 2054    Narrative:      The following orders were created for panel order COVID PRE-OP / PRE-PROCEDURE SCREENING ORDER (NO ISOLATION) - Swab, Nasopharynx.  Procedure                               Abnormality         Status                     ---------                               -----------         ------                     Respiratory Panel PCR w/...[[id number]]  Abnormal            Final result                 Please view results for these tests on the individual orders.    Respiratory Panel PCR w/COVID-19(SARS-CoV-2) HERNAN/DAKOTA/SCOOBY/PAD/COR/DARREN In-House, NP Swab in UTM/VTM, 2 HR TAT - Swab, Nasopharynx [[id number]]  (Abnormal) Collected: 01/02/24 1928    Lab Status: Final result Specimen: Swab from Nasopharynx Updated: 01/02/24 2054     ADENOVIRUS, PCR Not Detected     Coronavirus 229E Not Detected     Coronavirus HKU1 Not Detected     Coronavirus NL63 Not Detected     Coronavirus OC43 Not Detected     COVID19 Not Detected     Human Metapneumovirus Not Detected     Human Rhinovirus/Enterovirus Detected     Influenza A PCR Not Detected     Influenza B PCR Not Detected     Parainfluenza Virus 1 Not Detected     Parainfluenza Virus 2 Not Detected     Parainfluenza Virus 3 Not Detected     Parainfluenza Virus 4 Not Detected     RSV, PCR Not Detected     Bordetella pertussis pcr Not Detected      Bordetella parapertussis PCR Not Detected     Chlamydophila pneumoniae PCR Not Detected     Mycoplasma pneumo by PCR Not Detected    Narrative:      In the setting of a positive respiratory panel with a viral infection PLUS a negative procalcitonin without other underlying concern for bacterial infection, consider observing off antibiotics or discontinuation of antibiotics and continue supportive care. If the respiratory panel is positive for atypical bacterial infection (Bordetella pertussis, Chlamydophila pneumoniae, or Mycoplasma pneumoniae), consider antibiotic de-escalation to target atypical bacterial infection.            Reported Vancomycin Levels                 [employer] AUC Calculation:  Current AUC:   -- mg/L*hr    Predicted Steady State AUC on Current Dose: -- mg/L*hr  _________________________________  Predicted Steady State AUC on New Dose:   449 mg/L*hr    Assessment/Plan:  1. Will give vancomycin 2500 mg IV once (given in ED 1/3 at 02:03)                               followed by      Vancomycin 1500 mg IV q 18 hours    2. Vancomycin trough is scheduled 1/5 at 06:00 prior to the 4th dose. Predicted Steady State AUC at current dose: 449 mg/L*hr.      Surya Velez, PharmD, BCPS  1/3/2024  02:07 EST

## 2024-01-03 NOTE — PLAN OF CARE
Goal Outcome Evaluation:  Plan of Care Reviewed With: spouse      SLP evaluation completed. Will  f/u . Please see note for further details and recommendations.             Anticipated Discharge Disposition (SLP): home with OP services

## 2024-01-03 NOTE — H&P
"    [facility]  HISTORY AND PHYSICAL    Patient Name: Favian Arellano  : 1945  MRN: [id number]  Primary Care Physician: Saleem Yang MD  Date of admission: 2024      Subjective   Subjective     Chief Complaint:  Confusion    HPI:  Favian Arellano is a 78 y.o. male who is accompanied by his wife who correlates the history the patient gives, the patient has exhibited some confusion today.  The patient and wife state that earlier this afternoon () the patient had an episode where he was very confused, including parking his truck on the wrong side of the road and going in and out of a store several times at random.  The patient's wife states this is absolutely new for him, he has never done this before.  The patient also adds that he has been \"short of breath for months,\" and has been treated for pneumonia \"off and on since .\"  He states his shortness of breath has improved over that time, but is still present.  He denies any dyspnea on exertion or orthopnea.  The patient and wife both also note that the patient has had some occasional gait issues, specifically shuffling gait with occasional loss of balance, especially since he had a left knee replacement in May, though the wife feels the balance issues have been slightly worse over the last few weeks.  Also, the patient has had some urinary incontinence for the last few months; he does take an oral diuretic but the patient and wife do not think that has made any difference.  They both confirm that the patient sometimes does not make it to the restroom in time when he has the urge to urinate.  He denies any chest pain, fever/chills, nausea/emesis, slurred speech/facial droop, falls, loss of consciousness, headache, dizziness/lightheadedness, or syncope.  His medical history is significant for sleep apnea for which he uses a CPAP machine at home, atrial fibrillation (he takes Xarelto " for anticoagulation), and he has been diagnosed with prostate cancer but is not undergoing any treatment, monitored by outpatient physician.      Personal History     Past Medical History:   Diagnosis Date    Benign hypertension     History of eye surgery     Lower extremity edema     Mild obesity     PARKER (obstructive sleep apnea)     requiring CPAP    Osteoarthritis     Paroxysmal atrial fibrillation     Tachy-krishna syndrome              Past Surgical History:   Procedure Laterality Date    CARDIOVERSION      HERNIA REPAIR      INCISION AND DRAINAGE LEG Right 8/18/2017    Procedure: INCISION AND DRAINAGE RIGHT KNEE;  Surgeon: Francisco Macias MD;  Location: [facility];  Service:     JOINT REPLACEMENT      NEPHROLITHOTRIPSY PERCUTANEOUS         Family History: family history includes Alzheimer's disease in his mother; Cancer in his sister; Hypertension in an other family member.     Social History:  reports that he quit smoking about 37 years ago. His smoking use included cigarettes. He has quit using smokeless tobacco.  His smokeless tobacco use included chew. He reports current alcohol use of about 20.0 standard drinks of alcohol per week. He reports that he does not use drugs.  Social History     Social History Narrative    Not on file       Medications:  Available home medication information reviewed.  (Not in a hospital admission)      Allergies   Allergen Reactions    Naproxen      Patient states he has made changes on that       Objective   Objective     Vital Signs:   Temp:  [98.2 °F (36.8 °C)-99.8 °F (37.7 °C)] 99.8 °F (37.7 °C)  Heart Rate:  [] 115  Resp:  [18] 18  BP: (162-192)/() 165/101       Physical Exam   Constitutional: Awake, alert, NAD, pleasant.  Eyes: PERRLA, sclerae anicteric, no conjunctival injection  HENT: NCAT, mucous membranes moist  Neck: Supple, no thyromegaly, no lymphadenopathy, trachea midline  Respiratory: Good audible air movement but there is slight end expiratory  wheeze to auscultation bilaterally, nonlabored respirations   Cardiovascular: RRR, no murmurs, rubs, or gallops, palpable pedal pulses bilaterally  Gastrointestinal: Positive bowel sounds, soft, nontender, nondistended  Musculoskeletal: Trace bilateral distal lower extremity edema, no clubbing or cyanosis to extremities  Psychiatric: Appropriate affect, cooperative  Neurologic: Oriented x 3, strength symmetric in all extremities, Cranial Nerves grossly intact to confrontation, speech clear  Skin: No rashes, normal turgor.    Result Review:  I have personally reviewed the results from the time of this admission to 1/3/2024 01:24 EST and agree with these findings:  [x]  Laboratory list / accordion  []  Microbiology  [x]  Radiology  [x]  EKG/Telemetry   []  Cardiology/Vascular   []  Pathology  []  Old records  []  Other:  Most notable findings include: I reviewed chest x-ray which is a single AP view; radiology read mentions nothing acute, appears to be RLL infiltrate on my read.  CTA chest pending at the time of this note.  I reviewed EKG which by my read shows atrial fibrillation with RVR, ventricular rate approximately 120-125 bpm, normal axis, nonspecific ST/T wave changes.        LAB RESULTS:      Lab 01/02/24 1920   WBC 13.08*   HEMOGLOBIN 15.1   HEMATOCRIT 43.5   PLATELETS 251   NEUTROS ABS 10.77*   IMMATURE GRANS (ABS) 0.06*   LYMPHS ABS 0.64*   MONOS ABS 1.57*   EOS ABS 0.01   MCV 95.6   CRP 19.37*   PROCALCITONIN 0.31*   LACTATE 1.5         Lab 01/02/24 1920   SODIUM 133*   POTASSIUM 4.0   CHLORIDE 96*   CO2 23.0   ANION GAP 14.0   BUN 15   CREATININE 0.92   EGFR 85.7   GLUCOSE 119*   CALCIUM 8.8   MAGNESIUM 2.3   PHOSPHORUS 2.3*   TSH 0.977         Lab 01/02/24 1920   TOTAL PROTEIN 7.2   ALBUMIN 4.0   GLOBULIN 3.2   ALT (SGPT) 14   AST (SGOT) 27   BILIRUBIN 2.4*   ALK PHOS 89   LIPASE 31         Lab 01/02/24 1920   HSTROP T 22*                 Lab 01/02/24 2134   PH, ARTERIAL 7.463*   PCO2, ARTERIAL  35.8   PO2 ART 70.8*   FIO2 21   HCO3 ART 25.6   BASE EXCESS ART 2.1*   CARBOXYHEMOGLOBIN 2.0     UA          1/2/2024    19:22   Urinalysis   Squamous Epithelial Cells, UA 0-2    Specific Gravity, UA 1.018    Ketones, UA Trace    Blood, UA Small (1+)    Leukocytes, UA Trace    Nitrite, UA Negative    RBC, UA 6-10    WBC, UA 0-2    Bacteria, UA None Seen        Microbiology Results (last 10 days)       Procedure Component Value - Date/Time    COVID PRE-OP / PRE-PROCEDURE SCREENING ORDER (NO ISOLATION) - Swab, Nasopharynx [[id number]]  (Abnormal) Collected: 01/02/24 1928    Lab Status: Final result Specimen: Swab from Nasopharynx Updated: 01/02/24 2054    Narrative:      The following orders were created for panel order COVID PRE-OP / PRE-PROCEDURE SCREENING ORDER (NO ISOLATION) - Swab, Nasopharynx.  Procedure                               Abnormality         Status                     ---------                               -----------         ------                     Respiratory Panel PCR w/...[[id number]]  Abnormal            Final result                 Please view results for these tests on the individual orders.    Respiratory Panel PCR w/COVID-19(SARS-CoV-2) HERNAN/DAKOTA/SCOOBY/PAD/COR/DARREN In-House, NP Swab in UTM/VTM, 2 HR TAT - Swab, Nasopharynx [[id number]]  (Abnormal) Collected: 01/02/24 1928    Lab Status: Final result Specimen: Swab from Nasopharynx Updated: 01/02/24 2054     ADENOVIRUS, PCR Not Detected     Coronavirus 229E Not Detected     Coronavirus HKU1 Not Detected     Coronavirus NL63 Not Detected     Coronavirus OC43 Not Detected     COVID19 Not Detected     Human Metapneumovirus Not Detected     Human Rhinovirus/Enterovirus Detected     Influenza A PCR Not Detected     Influenza B PCR Not Detected     Parainfluenza Virus 1 Not Detected     Parainfluenza Virus 2 Not Detected     Parainfluenza Virus 3 Not Detected     Parainfluenza Virus 4 Not Detected     RSV, PCR Not Detected     Bordetella pertussis pcr  Not Detected     Bordetella parapertussis PCR Not Detected     Chlamydophila pneumoniae PCR Not Detected     Mycoplasma pneumo by PCR Not Detected    Narrative:      In the setting of a positive respiratory panel with a viral infection PLUS a negative procalcitonin without other underlying concern for bacterial infection, consider observing off antibiotics or discontinuation of antibiotics and continue supportive care. If the respiratory panel is positive for atypical bacterial infection (Bordetella pertussis, Chlamydophila pneumoniae, or Mycoplasma pneumoniae), consider antibiotic de-escalation to target atypical bacterial infection.            CT Angiogram Chest    Result Date: 1/3/2024  CT ANGIOGRAM CHEST Date of Exam: 1/3/2024 12:26 AM EST Indication: dyspnea, tachycardia, 2 weeks productive cough and infectious labs concerning.. Comparison: Chest radiograph 1/2/2024. Technique: CTA of the chest was performed after the uneventful intravenous administration of 85 mL of Isovue-370. Reconstructed coronal and sagittal images were also obtained. In addition, a 3-D volume rendered image was created for interpretation. Automated exposure control and iterative reconstruction methods were used. Findings: There is mild multifocal linear scarring in the lungs. There is patchy consolidated pneumonia in the dependent portion of the right lower lobe. There is associated peribronchial thickening. There is mild subpleural scarring at multiple locations in both lungs. Central airways are patent. There is segmentally obstructive endobronchial debris in the right lower lobe. No pneumothorax. There is a small right-sided pleural effusion. The thyroid, trachea and esophagus appear within normal limits. The heart is mildly enlarged. There are severe coronary artery calcifications. There is mild aortic atherosclerosis. No aneurysm. There is no evidence of pulmonary embolism. No pericardial effusion. There is a reactive right hilar  lymph node measuring up to 19 mm short axis. No acute findings in the superficial soft tissues. There is mild gynecomastia. There is a mildly calcified splenic artery aneurysm at the pancreatic tail measuring 15 mm diameter. There is a solitary gallstone without acute cholecystitis.     Impression: Impression: 1.No evidence of pulmonary embolism. 2.Right lower lobe pneumonia and small right-sided pleural effusion. 3.Mild cardiomegaly and severe coronary artery calcification. 4.Cholelithiasis without evidence of acute cholecystitis. 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 Electronically Signed: Mayco Coelho MD  1/3/2024 1:02 AM EST  Workstation ID: [id number]    MRI Brain Without Contrast    Result Date: 1/2/2024  MRI BRAIN WO CONTRAST Date of Exam: 1/2/2024 10:25 PM EST Indication: 3 weeks ataxia, encephalopathy.  Comparison: None available. Technique:  Routine multiplanar/multisequence sequence images of the brain were obtained without contrast administration. Findings: Severely motion degraded study. No acute infarct. 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 No definite intracranial hemorrhage, although evaluation is degraded by motion. No mass effect, edema or midline shift Moderate periventricular and subcortical white matter T2/FLAIR hyperintensities, nonspecific but most likely represents chronic small vessel ischemic changes. No extra-axial fluid collection. Prominent ventricular system secondary to chronic parenchymal volume loss. Superimposed normal pressure hydrocephalus is not completely excluded. Status post bilateral lens extraction. Otherwise the orbits are grossly unremarkable Mucosal thickening of the left maxillary sinus. Otherwise clear The visualized soft tissues are unremarkable. No acute osseous abnormality.     Impression: Impression: No acute infarct or intracranial hemorrhage. Please note that the study is severely motion degraded which limits  the evaluation for subtle findings. Moderate periventricular and subcortical T2/FLAIR hyperintensities, nonspecific but most likely represents chronic small vessel ischemic changes. Possible small old infarct in the right occipital lobe. Diffuse parenchymal volume loss. Superimposed normal pressure hydrocephalus is not excluded. Electronically Signed: Morro Koenig DO  1/2/2024 10:51 PM EST  Workstation ID: [id number]    XR Chest 1 View    Result Date: 1/2/2024  XR CHEST 1 VW Date of Exam: 1/2/2024 7:01 PM EST Indication: Weak/Dizzy/AMS triage protocol Comparison: 7/31/2021 Findings: Mild cardiomegaly is stable. There is linear scarring or atelectasis within the left upper lobe which is unchanged. Calcified granulomas are seen within the left lung base. There is no new airspace consult effusion or pneumothorax. Bony structures are unremarkable.     Impression: Impression: 1. Stable cardiomegaly. 2. No acute cardiopulmonary disease. Electronically Signed: Charles Pulido MD  1/2/2024 7:10 PM EST  Workstation ID: [id number]     Results for orders placed during the hospital encounter of 10/26/23    Adult Transthoracic Echo Complete w/ Color, Spectral and Contrast if necessary per protocol    Interpretation Summary    Left ventricular systolic function is normal. Estimated left ventricular EF = 55%    Left ventricular diastolic function is consistent with (grade I) impaired relaxation.    Trace mitral regurgitation.    Trace to mild tricuspid regurgitation.    Calculated right ventricular systolic pressure from tricuspid regurgitation is 17 mmHg.      Assessment & Plan   Assessment & Plan     Active Hospital Problems    Diagnosis  POA    **Ataxia [R27.0]  Yes       78M with confusion, RLL pneumonia, suspicion for normal pressure hydrocephalus given findings on MRI brain and stated history    RLL pneumonia  - Respiratory swab returned positive result for human rhinovirus/enterovirus.  - Vancomycin and Zosyn started in  the ED to cover for any bacterial component to his pneumonia, as he has been treated for pneumonia for over a month now.  - DuoNeb treatments scheduled every 6 hours, can have every 4 hours as needed.  - O2 as needed to maintain saturations >92%.  - Await blood culture results.  - Urine for Legionella and strep pneumo antigens.  - Sputum for culture.    Confusion, with some loss of balance/gait issues and worsening shuffling gait, and urinary incontinence  - Likely multifactorial, i.e. transient hypoxia versus NPH versus TIA versus insufficient sleep/PARKER  - Appears to have resolved, patient and wife state no recurrence since this afternoon.  - Appears restless in bed, will add as needed oral Ativan.  - MRI brain radiology read mentions nothing acute though mentions possibility of NPH.  - Will request neurology consult, input appreciated, will follow up their recommendations.    Atrial fibrillation  - Continue telemetry.  - Continue Xarelto from home regimen.  - No antiarrhythmics along his home regimen; can add on if necessary/address as needed.    Sleep apnea  - He may use his home CPAP machine or can be provided a machine to use here by respiratory therapy.    Prostate cancer  - He states he is not treated for this, monitored as outpatient.        Total time spent: 84 minutes  Time spent includes time reviewing chart, face-to-face time, counseling patient/family/caregiver, ordering medications/tests/procedures, communicating with other health care professionals, documenting clinical information in the electronic health record, and coordination of care.     DVT prophylaxis:  Xarelto      CODE STATUS:  full  Code Status and Medical Interventions:   Ordered at: 01/03/24 0042     Level Of Support Discussed With:    Patient     Code Status (Patient has no pulse and is not breathing):    CPR (Attempt to Resuscitate)     Medical Interventions (Patient has pulse or is breathing):    Full Support       Expected Discharge  tbd    Fernie Jacob,III, DO  01/03/24

## 2024-01-03 NOTE — THERAPY EVALUATION
Patient Name: Favian Arellano  : 1945    MRN: [id number]                              Today's Date: 1/3/2024       Admit Date: 2024    Visit Dx:     ICD-10-CM ICD-9-CM   1. Normal pressure hydrocephalus  G91.2 331.5   2. Pneumonia of right lower lobe due to infectious organism  J18.9 486   3. Confusion  R41.0 298.9   4. Permanent atrial fibrillation  I48.21 427.31   5. Mild obesity  E66.9 278.00   6. Essential hypertension  I10 401.9     Patient Active Problem List   Diagnosis    Permanent atrial fibrillation    Tachy-krishna syndrome    Essential hypertension    Lower extremity edema    PARKER (obstructive sleep apnea)    Osteoarthritis    Mild obesity    MG, ocular (myasthenia gravis)    Pain    Hematoma of right thigh    S/P Debridement irrigation and partial closure of cutaneous tissue right knee    Pneumonia    A-fib    Hyperbilirubinemia    Ataxia     Past Medical History:   Diagnosis Date    Benign hypertension     History of eye surgery     Lower extremity edema     Mild obesity     PARKER (obstructive sleep apnea)     requiring CPAP    Osteoarthritis     Paroxysmal atrial fibrillation     Tachy-krishna syndrome      Past Surgical History:   Procedure Laterality Date    CARDIOVERSION      HERNIA REPAIR      INCISION AND DRAINAGE LEG Right 2017    Procedure: INCISION AND DRAINAGE RIGHT KNEE;  Surgeon: Francisco Macias MD;  Location: [facility];  Service:     JOINT REPLACEMENT      NEPHROLITHOTRIPSY PERCUTANEOUS        General Information       Row Name 24 1118          Physical Therapy Time and Intention    Document Type evaluation  -KR     Mode of Treatment physical therapy;individual therapy  -KR       Row Name 24 1118          General Information    Patient Profile Reviewed yes  -KR     Prior Level of Function independent:;all household mobility;community mobility;ADL's;driving;work  no DME at baseline. 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  -KR      Existing Precautions/Restrictions other (see comments)  confusion  -KR     Barriers to Rehab cognitive status  -KR       Row Name 01/03/24 1118          Living Environment    People in Home spouse  -KR       Row Name 01/03/24 1118          Home Main Entrance    Number of Stairs, Main Entrance six  -KR     Stair Railings, Main Entrance railing on left side (ascending)  -KR       Row Name 01/03/24 1118          Stairs Within Home, Primary    Stairs, Within Home, Primary to second floor bedroom  -KR     Number of Stairs, Within Home, Primary other (see comments)  13  -KR     Stair Railings, Within Home, Primary railing on right side (ascending)  -KR       Row Name 01/03/24 1118          Cognition    Orientation Status (Cognition) oriented x 3  -KR       Row Name 01/03/24 1118          Safety Issues, Functional Mobility    Safety Issues Affecting Function (Mobility) insight into deficits/self-awareness  -KR     Impairments Affecting Function (Mobility) cognition;balance  -KR     Cognitive Impairments, Mobility Safety/Performance insight into deficits/self-awareness  -KR               User Key  (r) = Recorded By, (t) = Taken By, (c) = Cosigned By      Initials Name Provider Type    KR Cynthia Barnes, PT Physical Therapist                   Mobility       Row Name 01/03/24 1126          Bed Mobility    Bed Mobility supine-sit;sit-supine  -KR     Supine-Sit [place] (Bed Mobility) standby assist  -KR     Sit-Supine [place] (Bed Mobility) standby assist  -KR     Assistive Device (Bed Mobility) head of bed elevated  -KR     Comment, (Bed Mobility) increased effort req'd.  -KR       Row Name 01/03/24 1126          Sit-Stand Transfer    Sit-Stand [place] (Transfers) contact guard  -KR     Assistive Device (Sit-Stand Transfers) walker, front-wheeled  -KR     Comment, (Sit-Stand Transfer) 1x from bed  -KR       Row Name 01/03/24 1126          Gait/Stairs (Locomotion)    [place] Level (Gait) contact guard   -KR     Assistive Device (Gait) walker, front-wheeled  -KR     Distance in Feet (Gait) 240 + 60  -KR     [place] Level (Stairs) contact guard  -KR     Handrail Location (Stairs) right side (ascending);left side (descending)  -KR     Number of Steps (Stairs) 3  -KR     Ascending Technique (Stairs) step-over-step  -KR     Descending Technique (Stairs) step-to-step  -KR     Comment, (Gait/Stairs) initially ambulation with FWW progressing to unsupported. O2 saturation decreased to 86% with ambulation on RA requiring ~1 min seated rest with PLB to recover to >/=90%.  -KR               User Key  (r) = Recorded By, (t) = Taken By, (c) = Cosigned By      Initials Name Provider Type    Cynthia Valdivia, PT Physical Therapist                   Obj/Interventions       Row Name 01/03/24 1137          Range of Motion Comprehensive    General Range of Motion no range of motion deficits identified  -KR       Row Name 01/03/24 1137          Strength Comprehensive (MMT)    General Manual Muscle Testing (MMT) Assessment no strength deficits identified  -KR       Row Name 01/03/24 1137          Motor Skills    Motor Skills coordination  -KR     Coordination heel to shin;bilateral;WFL  -KR       Row Name 01/03/24 1137          Balance    Balance Assessment sitting static balance;sitting dynamic balance;standing static balance;standing dynamic balance  -KR     Static Sitting Balance independent  -KR     Dynamic Sitting Balance standby assist  -KR     Position, Sitting Balance unsupported;sitting edge of bed  -KR     Static Standing Balance standby assist  -KR     Dynamic Standing Balance contact guard  -KR     Position/Device Used, Standing Balance unsupported  -KR     Balance Interventions sitting;standing;sit to stand;static;dynamic  -KR       Row Name 01/03/24 1137          Sensory Assessment (Somatosensory)    Sensory Assessment (Somatosensory) bilateral LE  -KR     Bilateral LE Sensory Assessment light touch  awareness;general sensation;light touch localization;proprioception;intact  -KR               User Key  (r) = Recorded By, (t) = Taken By, (c) = Cosigned By      Initials Name Provider Type    Cynthia Valdivia, PT Physical Therapist                   Goals/Plan       Row Name 01/03/24 1140          Bed Mobility Goal 1 (PT)    Activity/Assistive Device (Bed Mobility Goal 1, PT) bed mobility activities, all  -KR     [place] Level/Cues Needed (Bed Mobility Goal 1, PT) independent  -KR     Time Frame (Bed Mobility Goal 1, PT) long term goal (LTG);2 weeks  -KR       Row Name 01/03/24 1140          Transfer Goal 1 (PT)    Activity/Assistive Device (Transfer Goal 1, PT) sit-to-stand/stand-to-sit;bed-to-chair/chair-to-bed  -KR     [place] Level/Cues Needed (Transfer Goal 1, PT) independent  -KR     Time Frame (Transfer Goal 1, PT) long term goal (LTG);2 weeks  -       Row Name 01/03/24 1140          Gait Training Goal 1 (PT)    Activity/Assistive Device (Gait Training Goal 1, PT) gait (walking locomotion);improve balance and speed;increase endurance/gait distance;decrease fall risk  -KR     [place] Level (Gait Training Goal 1, PT) independent  -KR     Distance (Gait Training Goal 1, PT) 500  -KR     Time Frame (Gait Training Goal 1, PT) long term goal (LTG);2 weeks  -       Row Name 01/03/24 1140          Stairs Goal 1 (PT)    Activity/Assistive Device (Stairs Goal 1, PT) stairs, all skills;using handrail, right  -KR     [place] Level/Cues Needed (Stairs Goal 1, PT) modified independence  -KR     Number of Stairs (Stairs Goal 1, PT) 13  -KR     Time Frame (Stairs Goal 1, PT) long term goal (LTG);2 weeks  -       Row Name 01/03/24 1140          Therapy Assessment/Plan (PT)    Planned Therapy Interventions (PT) balance training;bed mobility training;gait training;home exercise program;postural re-education;patient/family education;neuromuscular re-education;ROM (range of motion);stair  training;strengthening;stretching;transfer training  -KR               User Key  (r) = Recorded By, (t) = Taken By, (c) = Cosigned By      Initials Name Provider Type    Cynthia Valdivia PT Physical Therapist                   Clinical Impression       Row Name 01/03/24 1138          Pain    Pretreatment Pain Rating 0/10 - no pain  -KR     Posttreatment Pain Rating 0/10 - no pain  -KR       Row Name 01/03/24 1138          Plan of Care Review    Plan of Care Reviewed With patient;spouse  -KR     Outcome Evaluation Pt presents with situational confusion and mild balance deficits contributing to impairments in ambulation and overall fxnl mobility. Pt will benefit from PT to address aforementioned deficits and return to PLOF. PT rec home with assist upon dc.  -KR       Row Name 01/03/24 1138          Therapy Assessment/Plan (PT)    Rehab Potential (PT) good, to achieve stated therapy goals  -KR     Criteria for Skilled Interventions Met (PT) yes;skilled treatment is necessary  -KR     Therapy Frequency (PT) daily  -KR       Row Name 01/03/24 1138          Vital Signs    Pre SpO2 (%) 94  -KR     O2 Delivery Pre Treatment room air  -KR     Intra SpO2 (%) 86  -KR     O2 Delivery Intra Treatment room air  -KR     Post SpO2 (%) 94  -KR     O2 Delivery Post Treatment room air  -KR     Pre Patient Position Supine  -KR     Intra Patient Position Standing  -KR     Post Patient Position Supine  -KR       Row Name 01/03/24 1138          Positioning and Restraints    Pre-Treatment Position in bed  -KR     Post Treatment Position bed  -KR     In Bed notified nsg;fowlers;encouraged to call for assist;exit alarm on;call light within reach;with family/caregiver;side rails up x2  -KR               User Key  (r) = Recorded By, (t) = Taken By, (c) = Cosigned By      Initials Name Provider Type    Cynthia Valdivia PT Physical Therapist                   Outcome Measures       Row Name 01/03/24 1141 01/03/24 0226       How much help  from another person do you currently need...    Turning from your back to your side while in flat bed without using bedrails? 4  -KR 4  -BS    Moving from lying on back to sitting on the side of a flat bed without bedrails? 3  -KR 4  -BS    Moving to and from a bed to a chair (including a wheelchair)? 3  -KR 3  -BS    Standing up from a chair using your arms (e.g., wheelchair, bedside chair)? 4  -KR 4  -BS    Climbing 3-5 steps with a railing? 3  -KR 3  -BS    To walk in hospital room? 3  -KR 3  -BS    AM-PAC 6 Clicks Score (PT) 20  -KR 21  -BS    Highest Level of Mobility Goal 6 --> Walk 10 steps or more  -KR 6 --> Walk 10 steps or more  -BS              User Key  (r) = Recorded By, (t) = Taken By, (c) = Cosigned By      Initials Name Provider Type    BS Elizabeth Styles, RN Registered Nurse    Cynthia Valdivia, PT Physical Therapist                                 Physical Therapy Education       Title: PT OT SLP Therapies (In Progress)       Topic: Physical Therapy (In Progress)       Point: Mobility training (Done)       Learning Progress Summary             Patient Acceptance, E, VU by KR at 1/3/2024 1141   Family Acceptance, E, VU by KR at 1/3/2024 1141                         Point: Home exercise program (Not Started)       Learner Progress:  Not documented in this visit.              Point: Body mechanics (Done)       Learning Progress Summary             Patient Acceptance, E, VU by KR at 1/3/2024 1141   Family Acceptance, E, VU by KR at 1/3/2024 1141                         Point: Precautions (Done)       Learning Progress Summary             Patient Acceptance, E, VU by KR at 1/3/2024 1141   Family Acceptance, E, VU by KR at 1/3/2024 1141                                         User Key       Initials Effective Dates Name Provider Type Discipline    BEHZAD 12/30/22 -  Cynthia Barnes, DENISE Physical Therapist PT                  PT Recommendation and Plan  Planned Therapy Interventions (PT): balance training,  bed mobility training, gait training, home exercise program, postural re-education, patient/family education, neuromuscular re-education, ROM (range of motion), stair training, strengthening, stretching, transfer training  Plan of Care Reviewed With: patient, spouse  Outcome Evaluation: Pt presents with situational confusion and mild balance deficits contributing to impairments in ambulation and overall fxnl mobility. Pt will benefit from PT to address aforementioned deficits and return to PLOF. PT rec home with assist upon dc.     Time Calculation:   PT Evaluation Complexity  History, PT Evaluation Complexity: 3 or more personal factors and/or comorbidities  Examination of Body Systems (PT Eval Complexity): total of 4 or more elements  Clinical Presentation (PT Evaluation Complexity): stable  Clinical Decision Making (PT Evaluation Complexity): low complexity  Overall Complexity (PT Evaluation Complexity): low complexity     PT Charges       Row Name 01/03/24 1142             Time Calculation    Start Time 1045  -KR      PT Received On 01/03/24  -KR      PT Goal Re-Cert Due Date 01/13/24  -KR         Untimed Charges    PT Eval/Re-eval Minutes 55  -KR         Total Minutes    Untimed Charges Total Minutes 55  -KR       Total Minutes 55  -KR                User Key  (r) = Recorded By, (t) = Taken By, (c) = Cosigned By      Initials Name Provider Type    KR Cynthia Barnes, PT Physical Therapist                  Therapy Charges for Today       Code Description Service Date Service Provider Modifiers Qty    97764049703 HC PT EVAL LOW COMPLEXITY 4 1/3/2024 Cynthia Barnes PT GP 1            PT G-Codes  AM-PAC 6 Clicks Score (PT): 20  PT Discharge Summary  Anticipated Discharge Disposition (PT): home with assist    Cynthia Barnes PT  1/3/2024

## 2024-01-04 LAB
ANION GAP SERPL CALCULATED.3IONS-SCNC: 9 MMOL/L (ref 5–15)
BACTERIA BLD CULT: NORMAL
BACTERIA SPEC AEROBE CULT: NORMAL
BOTTLE TYPE: NORMAL
BUN SERPL-MCNC: 14 MG/DL (ref 8–23)
BUN/CREAT SERPL: 17.9 (ref 7–25)
CALCIUM SPEC-SCNC: 8.2 MG/DL (ref 8.6–10.5)
CHLORIDE SERPL-SCNC: 96 MMOL/L (ref 98–107)
CO2 SERPL-SCNC: 25 MMOL/L (ref 22–29)
CREAT SERPL-MCNC: 0.78 MG/DL (ref 0.76–1.27)
DEPRECATED RDW RBC AUTO: 44.5 FL (ref 37–54)
EGFRCR SERPLBLD CKD-EPI 2021: 91.9 ML/MIN/1.73
ERYTHROCYTE [DISTWIDTH] IN BLOOD BY AUTOMATED COUNT: 12.9 % (ref 12.3–15.4)
FOLATE SERPL-MCNC: >20 NG/ML (ref 4.78–24.2)
GLUCOSE SERPL-MCNC: 115 MG/DL (ref 65–99)
HCT VFR BLD AUTO: 38.1 % (ref 37.5–51)
HGB BLD-MCNC: 13.1 G/DL (ref 13–17.7)
MCH RBC QN AUTO: 32.3 PG (ref 26.6–33)
MCHC RBC AUTO-ENTMCNC: 34.4 G/DL (ref 31.5–35.7)
MCV RBC AUTO: 94.1 FL (ref 79–97)
PLATELET # BLD AUTO: 246 10*3/MM3 (ref 140–450)
PMV BLD AUTO: 9.4 FL (ref 6–12)
POTASSIUM SERPL-SCNC: 3.8 MMOL/L (ref 3.5–5.2)
RBC # BLD AUTO: 4.05 10*6/MM3 (ref 4.14–5.8)
SODIUM SERPL-SCNC: 130 MMOL/L (ref 136–145)
VIT B12 BLD-MCNC: 1000 PG/ML (ref 211–946)
WBC NRBC COR # BLD AUTO: 9.47 10*3/MM3 (ref 3.4–10.8)

## 2024-01-04 PROCEDURE — 87040 BLOOD CULTURE FOR BACTERIA: CPT | Performed by: STUDENT IN AN ORGANIZED HEALTH CARE EDUCATION/TRAINING PROGRAM

## 2024-01-04 PROCEDURE — 99232 SBSQ HOSP IP/OBS MODERATE 35: CPT | Performed by: STUDENT IN AN ORGANIZED HEALTH CARE EDUCATION/TRAINING PROGRAM

## 2024-01-04 PROCEDURE — 97165 OT EVAL LOW COMPLEX 30 MIN: CPT

## 2024-01-04 PROCEDURE — 80048 BASIC METABOLIC PNL TOTAL CA: CPT | Performed by: STUDENT IN AN ORGANIZED HEALTH CARE EDUCATION/TRAINING PROGRAM

## 2024-01-04 PROCEDURE — 97530 THERAPEUTIC ACTIVITIES: CPT

## 2024-01-04 PROCEDURE — 94799 UNLISTED PULMONARY SVC/PX: CPT

## 2024-01-04 PROCEDURE — 85027 COMPLETE CBC AUTOMATED: CPT | Performed by: STUDENT IN AN ORGANIZED HEALTH CARE EDUCATION/TRAINING PROGRAM

## 2024-01-04 PROCEDURE — 94664 DEMO&/EVAL PT USE INHALER: CPT

## 2024-01-04 PROCEDURE — 92610 EVALUATE SWALLOWING FUNCTION: CPT

## 2024-01-04 PROCEDURE — 25010000002 CEFTRIAXONE PER 250 MG: Performed by: STUDENT IN AN ORGANIZED HEALTH CARE EDUCATION/TRAINING PROGRAM

## 2024-01-04 RX ORDER — LORAZEPAM 1 MG/1
2 TABLET ORAL
Status: DISCONTINUED | OUTPATIENT
Start: 2024-01-04 | End: 2024-01-07 | Stop reason: HOSPADM

## 2024-01-04 RX ORDER — MIDAZOLAM HYDROCHLORIDE 1 MG/ML
2 INJECTION INTRAMUSCULAR; INTRAVENOUS
Status: DISCONTINUED | OUTPATIENT
Start: 2024-01-04 | End: 2024-01-07 | Stop reason: HOSPADM

## 2024-01-04 RX ORDER — MIDAZOLAM HYDROCHLORIDE 1 MG/ML
4 INJECTION INTRAMUSCULAR; INTRAVENOUS
Status: DISCONTINUED | OUTPATIENT
Start: 2024-01-04 | End: 2024-01-07 | Stop reason: HOSPADM

## 2024-01-04 RX ORDER — IPRATROPIUM BROMIDE AND ALBUTEROL SULFATE 2.5; .5 MG/3ML; MG/3ML
3 SOLUTION RESPIRATORY (INHALATION) EVERY 6 HOURS PRN
Status: DISCONTINUED | OUTPATIENT
Start: 2024-01-04 | End: 2024-01-07 | Stop reason: HOSPADM

## 2024-01-04 RX ORDER — FOLIC ACID 1 MG/1
1 TABLET ORAL DAILY
Status: DISCONTINUED | OUTPATIENT
Start: 2024-01-04 | End: 2024-01-07 | Stop reason: HOSPADM

## 2024-01-04 RX ORDER — MULTIPLE VITAMINS W/ MINERALS TAB 9MG-400MCG
1 TAB ORAL DAILY
Status: DISCONTINUED | OUTPATIENT
Start: 2024-01-04 | End: 2024-01-06

## 2024-01-04 RX ORDER — MULTIPLE VITAMINS W/ MINERALS TAB 9MG-400MCG
1 TAB ORAL DAILY
Status: DISCONTINUED | OUTPATIENT
Start: 2024-01-05 | End: 2024-01-04

## 2024-01-04 RX ORDER — LORAZEPAM 1 MG/1
1 TABLET ORAL
Status: DISCONTINUED | OUTPATIENT
Start: 2024-01-04 | End: 2024-01-07 | Stop reason: HOSPADM

## 2024-01-04 RX ORDER — DEXTROMETHORPHAN POLISTIREX 30 MG/5ML
60 SUSPENSION ORAL EVERY 12 HOURS SCHEDULED
Status: DISCONTINUED | OUTPATIENT
Start: 2024-01-04 | End: 2024-01-07 | Stop reason: HOSPADM

## 2024-01-04 RX ADMIN — Medication 10 ML: at 08:52

## 2024-01-04 RX ADMIN — OXYBUTYNIN CHLORIDE 5 MG: 5 TABLET ORAL at 08:51

## 2024-01-04 RX ADMIN — IPRATROPIUM BROMIDE AND ALBUTEROL SULFATE 3 ML: 2.5; .5 SOLUTION RESPIRATORY (INHALATION) at 07:36

## 2024-01-04 RX ADMIN — RIVAROXABAN 20 MG: 20 TABLET, FILM COATED ORAL at 08:51

## 2024-01-04 RX ADMIN — IPRATROPIUM BROMIDE AND ALBUTEROL SULFATE 3 ML: 2.5; .5 SOLUTION RESPIRATORY (INHALATION) at 00:01

## 2024-01-04 RX ADMIN — Medication 10 ML: at 21:24

## 2024-01-04 RX ADMIN — FOLIC ACID 1 MG: 1 TABLET ORAL at 18:31

## 2024-01-04 RX ADMIN — SERTRALINE HYDROCHLORIDE 50 MG: 50 TABLET ORAL at 08:51

## 2024-01-04 RX ADMIN — IPRATROPIUM BROMIDE AND ALBUTEROL SULFATE 3 ML: 2.5; .5 SOLUTION RESPIRATORY (INHALATION) at 23:37

## 2024-01-04 RX ADMIN — DEXTROMETHORPHAN POLISTIREX 60 MG: 30 SUSPENSION ORAL at 21:21

## 2024-01-04 RX ADMIN — CEFTRIAXONE 2000 MG: 2 INJECTION, POWDER, FOR SOLUTION INTRAMUSCULAR; INTRAVENOUS at 15:45

## 2024-01-04 RX ADMIN — DEXTROMETHORPHAN POLISTIREX 60 MG: 30 SUSPENSION ORAL at 10:36

## 2024-01-04 RX ADMIN — Medication 1 TABLET: at 21:21

## 2024-01-04 RX ADMIN — TORSEMIDE 20 MG: 20 TABLET ORAL at 08:51

## 2024-01-04 RX ADMIN — THIAMINE HCL TAB 100 MG 100 MG: 100 TAB at 18:31

## 2024-01-04 NOTE — PLAN OF CARE
Goal Outcome Evaluation:  Plan of Care Reviewed With: patient, spouse        Progress: no change  Outcome Evaluation: Patient presents with impaired activity tolerance, high leve balance and mild situational confusion impacting PLOF ADLs.  Skilled OT services warranted to address deficit areas and promote return to PLOF.  Pt. with elevated HR up to 150 with toileting.      Anticipated Discharge Disposition (OT): home with assist

## 2024-01-04 NOTE — PLAN OF CARE
Goal Outcome Evaluation:  Plan of Care Reviewed With: patient    SLP re-evaluation completed. Will sign-off as patient declined an instrumental evaluation of the swallow or any alteration in diet consistency at this time. Please see note for further details and recommendations.                    Anticipated Discharge Disposition (SLP): home with OP services

## 2024-01-04 NOTE — PLAN OF CARE
Goal Outcome Evaluation:  Plan of Care Reviewed With: patient, spouse        Progress: no change  Outcome Evaluation: Patient has been alert and oriented, In isolation r/t rhino virus, patient continues to have a cough. Heart rate has been in the low 100's, has improved since decreased neb treatments. Patient does become SOA with exertion, spouse asked if patient could ambulate in the [place], however patient declined at this time. Started Delsym for cough, currently on room air with O2 91-94%. Patient up in chair today, able to turn/reposition himself.

## 2024-01-04 NOTE — PROGRESS NOTES
[facility] Medicine Services  PROGRESS NOTE    Patient Name: Favian Arellano  : 1945  MRN: [id number]    Date of Admission: 2024  Primary Care Physician: Saleem Yang MD    Subjective   Subjective     CC:  Confusion, ataxia    HPI:  Evaluated patient this morning. Reports that he has been short of breath for several months, has had cough for around 3 weeks. Still coughing a lot today. Wife reports that patient's alcohol intake is increased over the past year, currently drinking at least 4-6 beers per day sometimes more. She is concerned over his increased alcohol intake.      Objective   Objective     Vital Signs:   Temp:  [98.2 °F (36.8 °C)-98.7 °F (37.1 °C)] 98.7 °F (37.1 °C)  Heart Rate:  [] 95  Resp:  [18] 18  BP: (106-168)/() 143/94     Physical Exam:  Constitutional: Awake, alert, resting comfortably  HENT: NCAT, mucous membranes moist  Respiratory: Clear to auscultation bilaterally, respiratory effort normal   Cardiovascular: RRR, no murmurs, rubs, or gallops  Gastrointestinal: Positive bowel sounds, soft, nontender, nondistended  Musculoskeletal: No bilateral ankle edema  Neurologic: Alert and oriented x 3, no focal deficits, speech clear. 5/5 in muscle strength of bilateral upper and lower extremities. Did not evaluate gait.  Skin: No rashes    Exam unchanged from 1/3.      Results Reviewed:  LAB RESULTS:      Lab 24  0559 24  1920   WBC 9.47 11.00* 13.08*   HEMOGLOBIN 13.1 12.9* 15.1   HEMATOCRIT 38.1 37.9 43.5   PLATELETS 246 215 251   NEUTROS ABS  --  8.03* 10.77*   IMMATURE GRANS (ABS)  --  0.06* 0.06*   LYMPHS ABS  --  1.07 0.64*   MONOS ABS  --  1.78* 1.57*   EOS ABS  --  0.03 0.01   MCV 94.1 96.7 95.6   CRP  --   --  19.37*   PROCALCITONIN  --   --  0.31*   LACTATE  --   --  1.5         Lab 24  0559 24  0402 24  1920   SODIUM 130* 134* 133*   POTASSIUM 3.8 4.2 4.0   CHLORIDE 96* 99 96*   CO2  25.0 25.0 23.0   ANION GAP 9.0 10.0 14.0   BUN 14 17 15   CREATININE 0.78 0.89 0.92   EGFR 91.9 88.3 85.7   GLUCOSE 115* 121* 119*   CALCIUM 8.2* 8.3* 8.8   MAGNESIUM  --  2.3 2.3   PHOSPHORUS  --   --  2.3*   HEMOGLOBIN A1C  --  4.80  --    TSH  --   --  0.977         Lab 01/03/24  0402 01/02/24 1920   TOTAL PROTEIN 5.4* 7.2   ALBUMIN 3.4* 4.0   GLOBULIN 2.0 3.2   ALT (SGPT) 11 14   AST (SGOT) 26 27   BILIRUBIN 2.3* 2.4*   ALK PHOS 70 89   LIPASE  --  31         Lab 01/02/24 1920   HSTROP T 22*             Lab 01/03/24 0402   FOLATE >20.00   VITAMIN B 12 1,000*         Lab 01/02/24 2134   PH, ARTERIAL 7.463*   PCO2, ARTERIAL 35.8   PO2 ART 70.8*   FIO2 21   HCO3 ART 25.6   BASE EXCESS ART 2.1*   CARBOXYHEMOGLOBIN 2.0     Brief Urine Lab Results  (Last result in the past 365 days)        Color   Clarity   Blood   Leuk Est   Nitrite   Protein   CREAT   Urine HCG        01/02/24 1922 Yellow   Clear   Small (1+)   Trace   Negative   30 mg/dL (1+)                   Microbiology Results Abnormal       Procedure Component Value - Date/Time    Urine Culture - Urine, Urine, Clean Catch [[id number]] Collected: 01/02/24 1922    Lab Status: Final result Specimen: Urine, Clean Catch Updated: 01/04/24 1042     Urine Culture <10,000 CFU/mL Normal Urogenital Kathy    Narrative:      Colonization of the urinary tract without infection is common. Treatment is discouraged unless the patient is symptomatic, pregnant, or undergoing an invasive urologic procedure.    Blood Culture ID, PCR - Blood, Arm, Left [[id number]] Collected: 01/02/24 1920    Lab Status: Final result Specimen: Blood from Arm, Left Updated: 01/04/24 0138     BCID, PCR Negative by BCID PCR. Culture to Follow.     BOTTLE TYPE Aerobic Bottle    Blood Culture - Blood, Arm, Right [[id number]]  (Normal) Collected: 01/02/24 2143    Lab Status: Preliminary result Specimen: Blood from Arm, Right Updated: 01/03/24 0422     Blood Culture No growth at 24 hours    Respiratory  Culture - Sputum, Cough [[id number]] Collected: 01/03/24 1630    Lab Status: Final result Specimen: Sputum from Cough Updated: 01/03/24 2208     Respiratory Culture Rejected     Gram Stain Few (2+) WBCs per low power field      Rare (1+) Epithelial cells per low power field      Rare (1+) Gram positive cocci    Narrative:      Specimen rejected due to oropharyngeal contamination. Please reorder and recollect specimen if clinically necessary.    MRSA Screen, PCR (Inpatient) - Swab, Nares [[id number]]  (Normal) Collected: 01/03/24 0943    Lab Status: Final result Specimen: Swab from Nares Updated: 01/03/24 1238     MRSA PCR Negative    Narrative:      The negative predictive value of this diagnostic test is high and should only be used to consider de-escalating anti-MRSA therapy. A positive result may indicate colonization with MRSA and must be correlated clinically.  MRSA Negative    Legionella Antigen, Urine - Urine, Urine, Clean Catch [[id number]]  (Normal) Collected: 01/02/24 1922    Lab Status: Final result Specimen: Urine, Clean Catch Updated: 01/03/24 0945     LEGIONELLA ANTIGEN, URINE Negative    S. Pneumo Ag Urine or CSF - Urine, Urine, Clean Catch [[id number]]  (Normal) Collected: 01/02/24 1922    Lab Status: Final result Specimen: Urine, Clean Catch Updated: 01/03/24 0945     Strep Pneumo Ag Negative            CT Angiogram Chest    Result Date: 1/3/2024  CT ANGIOGRAM CHEST Date of Exam: 1/3/2024 12:26 AM EST Indication: dyspnea, tachycardia, 2 weeks productive cough and infectious labs concerning.. Comparison: Chest radiograph 1/2/2024. Technique: CTA of the chest was performed after the uneventful intravenous administration of 85 mL of Isovue-370. Reconstructed coronal and sagittal images were also obtained. In addition, a 3-D volume rendered image was created for interpretation. Automated exposure control and iterative reconstruction methods were used. Findings: There is mild multifocal linear scarring in  the lungs. There is patchy consolidated pneumonia in the dependent portion of the right lower lobe. There is associated peribronchial thickening. There is mild subpleural scarring at multiple locations in both lungs. Central airways are patent. There is segmentally obstructive endobronchial debris in the right lower lobe. No pneumothorax. There is a small right-sided pleural effusion. The thyroid, trachea and esophagus appear within normal limits. The heart is mildly enlarged. There are severe coronary artery calcifications. There is mild aortic atherosclerosis. No aneurysm. There is no evidence of pulmonary embolism. No pericardial effusion. There is a reactive right hilar lymph node measuring up to 19 mm short axis. No acute findings in the superficial soft tissues. There is mild gynecomastia. There is a mildly calcified splenic artery aneurysm at the pancreatic tail measuring 15 mm diameter. There is a solitary gallstone without acute cholecystitis.     Impression: Impression: 1.No evidence of pulmonary embolism. 2.Right lower lobe pneumonia and small right-sided pleural effusion. 3.Mild cardiomegaly and severe coronary artery calcification. 4.Cholelithiasis without evidence of acute cholecystitis. 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 Electronically Signed: Mayco Coelho MD  1/3/2024 1:02 AM EST  Workstation ID: [id number]    MRI Brain Without Contrast    Result Date: 1/2/2024  MRI BRAIN WO CONTRAST Date of Exam: 1/2/2024 10:25 PM EST Indication: 3 weeks ataxia, encephalopathy.  Comparison: None available. Technique:  Routine multiplanar/multisequence sequence images of the brain were obtained without contrast administration. Findings: Severely motion degraded study. No acute infarct. 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 No definite intracranial hemorrhage, although evaluation is degraded by motion. No mass effect, edema or midline shift Moderate  periventricular and subcortical white matter T2/FLAIR hyperintensities, nonspecific but most likely represents chronic small vessel ischemic changes. No extra-axial fluid collection. Prominent ventricular system secondary to chronic parenchymal volume loss. Superimposed normal pressure hydrocephalus is not completely excluded. Status post bilateral lens extraction. Otherwise the orbits are grossly unremarkable Mucosal thickening of the left maxillary sinus. Otherwise clear The visualized soft tissues are unremarkable. No acute osseous abnormality.     Impression: Impression: No acute infarct or intracranial hemorrhage. Please note that the study is severely motion degraded which limits the evaluation for subtle findings. Moderate periventricular and subcortical T2/FLAIR hyperintensities, nonspecific but most likely represents chronic small vessel ischemic changes. Possible small old infarct in the right occipital lobe. Diffuse parenchymal volume loss. Superimposed normal pressure hydrocephalus is not excluded. Electronically Signed: Morro Koenig DO  1/2/2024 10:51 PM EST  Workstation ID: [id number]    XR Chest 1 View    Result Date: 1/2/2024  XR CHEST 1 VW Date of Exam: 1/2/2024 7:01 PM EST Indication: Weak/Dizzy/AMS triage protocol Comparison: 7/31/2021 Findings: Mild cardiomegaly is stable. There is linear scarring or atelectasis within the left upper lobe which is unchanged. Calcified granulomas are seen within the left lung base. There is no new airspace consult effusion or pneumothorax. Bony structures are unremarkable.     Impression: Impression: 1. Stable cardiomegaly. 2. No acute cardiopulmonary disease. Electronically Signed: Charles Pulido MD  1/2/2024 7:10 PM EST  Workstation ID: [id number]     Results for orders placed during the hospital encounter of 10/26/23    Adult Transthoracic Echo Complete w/ Color, Spectral and Contrast if necessary per protocol    Interpretation Summary    Left ventricular  systolic function is normal. Estimated left ventricular EF = 55%    Left ventricular diastolic function is consistent with (grade I) impaired relaxation.    Trace mitral regurgitation.    Trace to mild tricuspid regurgitation.    Calculated right ventricular systolic pressure from tricuspid regurgitation is 17 mmHg.      Current medications:  Scheduled Meds:cefTRIAXone, 2,000 mg, Intravenous, Q24H  dextromethorphan polistirex ER, 60 mg, Oral, Q12H  oxybutynin, 5 mg, Oral, Daily  rivaroxaban, 20 mg, Oral, Daily  sertraline, 50 mg, Oral, Daily  sodium chloride, 10 mL, Intravenous, Q12H  torsemide, 20 mg, Oral, Daily      Continuous Infusions:     PRN Meds:.  acetaminophen    Calcium Replacement - Follow Nurse / BPA Driven Protocol    HYDROcodone-acetaminophen    ipratropium-albuterol    LORazepam    Magnesium Standard Dose Replacement - Follow Nurse / BPA Driven Protocol    melatonin    nitroglycerin    ondansetron    Phosphorus Replacement - Follow Nurse / BPA Driven Protocol    Potassium Replacement - Follow Nurse / BPA Driven Protocol    sodium chloride    sodium chloride    Assessment & Plan   Assessment & Plan     Active Hospital Problems    Diagnosis  POA    **Ataxia [R27.0]  Yes      Resolved Hospital Problems   No resolved problems to display.        Brief Hospital Course to date:  Favian Arellano is a 78 y.o. male with past medical history of permanent atrial fibrillation, hypertension, PARKER, tachybradycardia syndrome who presented with complaints of confusion and ataxia. Was found to be positive for rhinovirus/enterovirus with right lower lobe pneumonia on admission.    This patient's problems and plans were partially entered by my partner and updated as appropriate by me 01/04/24.    RLL pneumonia  Rhinovirus/enterovirus infection  Leukocytosis  -Respiratory PCR positive result for human rhinovirus/enterovirus.  -1/2 blood cultures positive for gram positive cocci in groups, negative BCID  -CT chest with  evidence of right lower lobe pneumonia  -Legionella and strep pneumo urine antigens negative. MRSA probe negative.  -Continue IV ceftriaxone. Continue DuoNeb treatments PRN. Continue supportive care with antitussives. Blood cultures x 2 in process. Will repeat blood cultures x2 today. Daily CBC.     Confusion, with ataxia and hx urinary incontinence  -Likely multifactorial, confusion could be explained by active infection, may also have early cognitive decline complicated by active alcohol use; urinary incontinence could be secondary to prostate cancer and oxybutynin  -MRI brain without acute stroke, with diffuse parenchymal volume loss  -Neurology evaluated, recommended outpatient EMG/NCS. Will check B12, folate, A1c, SPEP. Follow-up with outpatient neurology in 1 month.    Alcohol use   -wife reports patient is drinking at least 4-6 beers per day, much more over the holidays  -Will monitor closely here on CIWA protocol.     Permanent atrial fibrillation  -No antiarrhythmics along his home regimen; can add on as needed. Follows with cardiology.  -Continue Xarelto from home regimen.  -Continue telemetry.    Hypertension  -Continue home torsemide. Follows with cardiology.    Obstructive sleep apnea  -He may use his home CPAP machine or can be provided a machine to use here by respiratory therapy.    Anxiety/depression  -Continue home sertraline.     Prostate cancer   -Under active surveillance as outpatient. Continue outpatient follow-up with urology.    Expected Discharge Location and Transportation: Likely home  Expected Discharge   Expected Discharge Date: 1/5/2024; Expected Discharge Time:      DVT prophylaxis:  Medical DVT prophylaxis orders are present.     AM-PAC 6 Clicks Score (PT): 21 (01/04/24 0830)    CODE STATUS:   Code Status and Medical Interventions:   Ordered at: 01/03/24 0042     Level Of Support Discussed With:    Patient     Code Status (Patient has no pulse and is not breathing):    CPR (Attempt  to Resuscitate)     Medical Interventions (Patient has pulse or is breathing):    Full Support       Kylie Schroeder,   01/04/24

## 2024-01-04 NOTE — THERAPY EVALUATION
Patient Name: Favian Arellano  : 1945    MRN: [id number]                              Today's Date: 2024       Admit Date: 2024    Visit Dx:     ICD-10-CM ICD-9-CM   1. Normal pressure hydrocephalus  G91.2 331.5   2. Pneumonia of right lower lobe due to infectious organism  J18.9 486   3. Confusion  R41.0 298.9   4. Permanent atrial fibrillation  I48.21 427.31   5. Mild obesity  E66.9 278.00   6. Essential hypertension  I10 401.9   7. Neuropathy  G62.9 355.9   8. Other disorders of peripheral nervous system  G64 357.89     Patient Active Problem List   Diagnosis    Permanent atrial fibrillation    Tachy-krishna syndrome    Essential hypertension    Lower extremity edema    PARKER (obstructive sleep apnea)    Osteoarthritis    Mild obesity    MG, ocular (myasthenia gravis)    Pain    Hematoma of right thigh    S/P Debridement irrigation and partial closure of cutaneous tissue right knee    Pneumonia    A-fib    Hyperbilirubinemia    Ataxia     Past Medical History:   Diagnosis Date    Benign hypertension     History of eye surgery     Lower extremity edema     Mild obesity     PARKER (obstructive sleep apnea)     requiring CPAP    Osteoarthritis     Paroxysmal atrial fibrillation     Tachy-krishna syndrome      Past Surgical History:   Procedure Laterality Date    CARDIOVERSION      HERNIA REPAIR      INCISION AND DRAINAGE LEG Right 2017    Procedure: INCISION AND DRAINAGE RIGHT KNEE;  Surgeon: Francisco Macias MD;  Location: [facility];  Service:     JOINT REPLACEMENT      NEPHROLITHOTRIPSY PERCUTANEOUS        General Information       Row Name 24 1000          OT Time and Intention    Document Type evaluation  -JENNIE     Mode of Treatment occupational therapy  -JENNIE       Row Name 24 1000          General Information    Patient Profile Reviewed yes  -JENNIE     Prior Level of Function independent:;all household mobility;community mobility;gait;transfer;bed  mobility;feeding;grooming;dressing;bathing;driving;using stairs;work  wife does most of the HM tasks  -JENNIE     Existing Precautions/Restrictions other (see comments)  droplet, situation confusion, per wife amount varies during day  -JENNIE     Barriers to Rehab cognitive status;hearing deficit  -JENNIE       Row Name 01/04/24 1000          Occupational Profile    Environmental Supports and Barriers (Occupational Profile) wx in shower with one grab bar, standard toilet  -JENNIE       Row Name 01/04/24 1000          Living Environment    People in Home spouse  per spouse she can be there all the time  -EJNNIE       Row Name 01/04/24 1000          Home Main Entrance    Number of Stairs, Main Entrance six  -JENNIE     Stair Railings, Main Entrance railing on left side (ascending)  -JENNIE       Row Name 01/04/24 1000          Stairs Within Home, Primary    Stairs, Within Home, Primary 2nd floor bedroom  -JENNIE     Number of Stairs, Within Home, Primary other (see comments)  13  -JENNIE     Stair Railings, Within Home, Primary railing on right side (ascending)  -JENNIE       Row Name 01/04/24 1000          Cognition    Orientation Status (Cognition) oriented x 3  -JENNIE       Row Name 01/04/24 1000          Safety Issues, Functional Mobility    Safety Issues Affecting Function (Mobility) safety precaution awareness;insight into deficits/self-awareness  -JENNIE     Impairments Affecting Function (Mobility) cognition;endurance/activity tolerance;balance;shortness of breath  -JENNIE     Cognitive Impairments, Mobility Safety/Performance insight into deficits/self-awareness;safety precaution awareness  -JENNIE               User Key  (r) = Recorded By, (t) = Taken By, (c) = Cosigned By      Initials Name Provider Type    JENNIE Ora Horowitz, OT Occupational Therapist                     Mobility/ADL's       Row Name 01/04/24 1003          Bed Mobility    Supine-Sit [place] (Bed Mobility) standby assist  -JENNIE     Assistive Device (Bed Mobility) head of bed elevated;bed  rails  -       Row Name 01/04/24 1003          Transfers    Transfers sit-stand transfer;stand-sit transfer;toilet transfer  -       Row Name 01/04/24 1003          Sit-Stand Transfer    Sit-Stand [place] (Transfers) contact guard  -     Assistive Device (Sit-Stand Transfers) walker, front-wheeled  -JENNIE       Row Name 01/04/24 1003          Stand-Sit Transfer    Stand-Sit [place] (Transfers) contact guard  -JENNIE     Assistive Device (Stand-Sit Transfers) walker, front-wheeled  -JENNIE       Row Name 01/04/24 1003          Toilet Transfer    Type (Toilet Transfer) stand-sit;sit-stand  -JENNIE     [place] Level (Toilet Transfer) contact guard;standby assist  -     Assistive Device (Toilet Transfer) commode;grab bars/safety frame  -[facility] Name 01/04/24 1003          Functional Mobility    Functional Mobility- Ind. Level contact guard assist;verbal cues required  -     Functional Mobility-Distance (Feet) --  grossly 8 + 8 + 8  -     Functional Mobility- Comment pt. needed cues for line safety  -[facility] Name 01/04/24 1003          Activities of Daily Living    BADL Assessment/Intervention lower body dressing;toileting;upper body dressing;grooming;feeding  -[facility] Name 01/04/24 1003          Lower Body Dressing Assessment/Training    [place] Level (Lower Body Dressing) doff;don;socks;dependent (less than 25% patient effort)  -JENNIE     Position (Lower Body Dressing) supported sitting  -JENNIE     Comment, (Lower Body Dressing) pt. had urinated in [place] when having BM in toilet and when stood urine all over floor, socks changed out  -[facility] Name 01/04/24 1003          Toileting Assessment/Training    [place] Level (Toileting) adjust/manage clothing;minimum assist (75% patient effort);perform perineal hygiene;standby assist  -JENNIE     Position (Toileting) supported standing;supported sitting  -[facility] Name 01/04/24 1003          Upper Body Dressing Assessment/Training     [place] Level (Upper Body Dressing) don;pajama/robe;moderate assist (50% patient effort);verbal cues  -     Position (Upper Body Dressing) edge of bed sitting  -JENNIE     Comment, (Upper Body Dressing) cues to place hands in correct sleeve  -       Row Name 01/04/24 1003          Grooming Assessment/Training    [place] Level (Grooming) wash face, hands;standby assist  -JENNIE     Position (Grooming) unsupported standing  -       Row Name 01/04/24 1003          Self-Feeding Assessment/Training    [place] Level (Feeding) liquids to mouth;scoop food and bring to mouth;prepare tray/open items;independent  -JENNIE     Position (Self-Feeding) supported sitting  -JENNIE     Comment, (Feeding) attempted to find pt. syrup for pancakes unsucessfully, got pt. milk and cereal  -               User Key  (r) = Recorded By, (t) = Taken By, (c) = Cosigned By      Initials Name Provider Type    JENNIE Ora Horowitz OT Occupational Therapist                   Obj/Interventions       [facility] Name 01/04/24 1006          Sensory Assessment (Somatosensory)    Sensory Assessment (Somatosensory) UE sensation intact  -[facility] Name 01/04/24 1006          Vision Assessment/Intervention    Visual Impairment/Limitations corrective lenses full-time  -[facility] Name 01/04/24 1006          Range of Motion Comprehensive    General Range of Motion bilateral upper extremity ROM WFL  -[facility] Name 01/04/24 1006          Strength Comprehensive (MMT)    General Manual Muscle Testing (MMT) Assessment no strength deficits identified  -     Comment, General Manual Muscle Testing (MMT) Assessment BUE  -[facility] Name 01/04/24 1006          Motor Skills    Motor Skills functional endurance  -     Functional Endurance 02 varied low to mid 90's  -[facility] Name 01/04/24 1006          Balance    Balance Assessment sitting static balance;sitting dynamic balance;standing static balance;standing dynamic balance  -     Static Sitting  Balance independent  -JENNIE     Dynamic Sitting Balance standby assist  -JENNIE     Position, Sitting Balance unsupported;sitting in chair;sitting edge of bed  toilet  -JENNIE     Static Standing Balance standby assist  -JENNIE     Dynamic Standing Balance contact guard  -JENNIE     Position/Device Used, Standing Balance unsupported  -JENNIE     Balance Interventions sit to stand;occupation based/functional task  -JENNIE     Comment, Balance grooming, toileting, UBD  -JENNIE               User Key  (r) = Recorded By, (t) = Taken By, (c) = Cosigned By      Initials Name Provider Type    Ora Ryan, OT Occupational Therapist                   Goals/Plan       Row Name 01/04/24 1014          Transfer Goal 1 (OT)    Activity/Assistive Device (Transfer Goal 1, OT) sit-to-stand/stand-to-sit;bed-to-chair/chair-to-bed;toilet  -JENNIE     [place] Level/Cues Needed (Transfer Goal 1, OT) independent  -JENNIE     Time Frame (Transfer Goal 1, OT) long term goal (LTG);10 days  -JENNIE     Strategies/Barriers (Transfers Goal 1, OT) good safety demonstrated  -JENNIE     Progress/Outcome (Transfer Goal 1, OT) new goal  -JENNIE       Row Name 01/04/24 1014          Dressing Goal 1 (OT)    Activity/Device (Dressing Goal 1, OT) lower body dressing  -JENNIE     [place]/Cues Needed (Dressing Goal 1, OT) set-up required;standby assist  -JENNIE     Time Frame (Dressing Goal 1, OT) long term goal (LTG);10 days  -JENNIE     Strategies/Barriers (Dressing Goal 1, OT) socks/pants and pull over/button up shirt as available  -JENNIE     Progress/Outcome (Dressing Goal 1, OT) new goal  -JENNIE       Row Name 01/04/24 1014          Therapy Assessment/Plan (OT)    Planned Therapy Interventions (OT) activity tolerance training;BADL retraining;cognitive/visual perception retraining;ROM/therapeutic exercise;patient/caregiver education/training;transfer/mobility retraining;functional balance retraining;occupation/activity based interventions  -JENNIE               User Key  (r) = Recorded By, (t) = Taken By,  (c) = Cosigned By      Initials Name Provider Type    Ora Ryan, OT Occupational Therapist                   Clinical Impression       Row Name 01/04/24 1008          Pain Assessment    Pretreatment Pain Rating 5/10  -     Pain Location - Side/Orientation Bilateral  -     Pain Location - abdomen  -     Pre/Posttreatment Pain Comment per pt. from coughing and possibly BM need  -     Pain Intervention(s) Ambulation/increased activity;Repositioned  pt. with BM  -[facility] Name 01/04/24 1008          Pain Scale: FACES Pre/Post-Treatment    Posttreatment Pain Rating 2-->hurts little bit  -       Row Name 01/04/24 1008          Plan of Care Review    Plan of Care Reviewed With patient;spouse  -     Progress no change  -     Outcome Evaluation Patient presents with impaired activity tolerance, high leve balance and mild situational confusion impacting PLOF ADLs.  Skilled OT services warranted to address deficit areas and promote return to PLOF.  Pt. with elevated HR up to 150 with toileting.  -[facility] Name 01/04/24 1008          Therapy Assessment/Plan (OT)    Patient/Family Therapy Goal Statement (OT) return to PLOF ADL/work  -     Rehab Potential (OT) good, to achieve stated therapy goals  -     Criteria for Skilled Therapeutic Interventions Met (OT) yes;meets criteria;skilled treatment is necessary  -     Therapy Frequency (OT) daily  -[facility] Name 01/04/24 1008          Therapy Plan Review/Discharge Plan (OT)    Anticipated Discharge Disposition (OT) home with assist  -[facility] Name 01/04/24 1008          Vital Signs    Pre Systolic BP Rehab 149  -JENNIE     Pre Treatment Diastolic BP 93  -JENNIE     Pretreatment Heart Rate (beats/min) 105  -JENNIE     Intratreatment Heart Rate (beats/min) 150   -JENNIE     Posttreatment Heart Rate (beats/min) 117  -JENNIE     Pre SpO2 (%) 93  -JENNIE     O2 Delivery Pre Treatment room air  -     Intra SpO2 (%) 91  -JENNIE     O2 Delivery Intra Treatment room air   -JENNIE     Post SpO2 (%) 96  -JENNIE     O2 Delivery Post Treatment room air  -JENNIE     Pre Patient Position Supine  -JENNIE     Intra Patient Position Standing  -JENNIE     Post Patient Position Sitting  -JENNIE       Row Name 01/04/24 1008          Positioning and Restraints    Pre-Treatment Position in bed  -JENNIE     Post Treatment Position chair  -JENNIE     In Chair notified nsg;reclined;call light within reach;encouraged to call for assist;exit alarm on;with family/caregiver;legs elevated  -JENNIE               User Key  (r) = Recorded By, (t) = Taken By, (c) = Cosigned By      Initials Name Provider Type    Ora Ryan, OT Occupational Therapist                   Outcome Measures       Row Name 01/04/24 1016          How much help from another is currently needed...    Putting on and taking off regular lower body clothing? 2  -JENNIE     Bathing (including washing, rinsing, and drying) 3  -JENNIE     Toileting (which includes using toilet bed pan or urinal) 3  -JENNIE     Putting on and taking off regular upper body clothing 3  -JENNIE     Taking care of personal grooming (such as brushing teeth) 3  -JENNIE     Eating meals 4  -JENNIE     AM-PAC 6 Clicks Score (OT) 18  -JENNIE       Row Name 01/04/24 0830          How much help from another person do you currently need...    Turning from your back to your side while in flat bed without using bedrails? 4  -VL     Moving from lying on back to sitting on the side of a flat bed without bedrails? 4  -VL     Moving to and from a bed to a chair (including a wheelchair)? 3  -VL     Standing up from a chair using your arms (e.g., wheelchair, bedside chair)? 4  -VL     Climbing 3-5 steps with a railing? 3  -VL     To walk in hospital room? 3  -VL     AM-PAC 6 Clicks Score (PT) 21  -VL     Highest Level of Mobility Goal 6 --> Walk 10 steps or more  -VL       Row Name 01/04/24 1016          Functional Assessment    Outcome Measure Options AM-PAC 6 Clicks Daily Activity (OT)  -JENNIE               User Key  (r) = Recorded By,  (t) = Taken By, (c) = Cosigned By      Initials Name Provider Type    JENNIE Ora Horowitz, OT Occupational Therapist    Zahra Mckeon RN Registered Nurse                    Occupational Therapy Education       Title: PT OT SLP Therapies (In Progress)       Topic: Occupational Therapy (In Progress)       Point: ADL training (Done)       Description:   Instruct learner(s) on proper safety adaptation and remediation techniques during self care or transfers.   Instruct in proper use of assistive devices.                  Learning Progress Summary             Patient Acceptance, E, VU,NR by  at 1/4/2024 1016    Comment: reason for consult, noted deficits   Significant Other Acceptance, E, VU,NR by  at 1/4/2024 1016    Comment: reason for consult, noted deficits                         Point: Home exercise program (Not Started)       Description:   Instruct learner(s) on appropriate technique for monitoring, assisting and/or progressing therapeutic exercises/activities.                  Learner Progress:  Not documented in this visit.              Point: Precautions (Done)       Description:   Instruct learner(s) on prescribed precautions during self-care and functional transfers.                  Learning Progress Summary             Patient Acceptance, E, VU,NR by  at 1/4/2024 1016    Comment: reason for consult, noted deficits   Significant Other Acceptance, E, VU,NR by  at 1/4/2024 1016    Comment: reason for consult, noted deficits                         Point: Body mechanics (Not Started)       Description:   Instruct learner(s) on proper positioning and spine alignment during self-care, functional mobility activities and/or exercises.                  Learner Progress:  Not documented in this visit.                              User Key       Initials Effective Dates Name Provider Type Discipline     07/11/23 -  Ora Horowitz, OT Occupational Therapist OT                  OT Recommendation and  Plan  Planned Therapy Interventions (OT): activity tolerance training, BADL retraining, cognitive/visual perception retraining, ROM/therapeutic exercise, patient/caregiver education/training, transfer/mobility retraining, functional balance retraining, occupation/activity based interventions  Therapy Frequency (OT): daily  Plan of Care Review  Plan of Care Reviewed With: patient, spouse  Progress: no change  Outcome Evaluation: Patient presents with impaired activity tolerance, high leve balance and mild situational confusion impacting PLOF ADLs.  Skilled OT services warranted to address deficit areas and promote return to PLOF.  Pt. with elevated HR up to 150 with toileting.     Time Calculation:   Evaluation Complexity (OT)  Review Occupational Profile/Medical/Therapy History Complexity: brief/low complexity  Assessment, Occupational Performance/Identification of Deficit Complexity: 1-3 performance deficits  Clinical Decision Making Complexity (OT): problem focused assessment/low complexity  Overall Complexity of Evaluation (OT): low complexity     Time Calculation- OT       Row Name 01/04/24 1017             Time Calculation- OT    OT Start Time 0827  -JENNIE      OT Received On 01/04/24  -JENNIE      OT Goal Re-Cert Due Date 01/14/24  -JENNIE         Timed Charges    17293 - OT Therapeutic Activity Minutes 5  -JENNIE      99141 - OT Self Care/Mgmt Minutes 5  -JENNIE         Untimed Charges    OT Eval/Re-eval Minutes 52  -JENNIE         Total Minutes    Timed Charges Total Minutes 10  -JENNIE      Untimed Charges Total Minutes 52  -JENNIE       Total Minutes 62  -JENNIE                User Key  (r) = Recorded By, (t) = Taken By, (c) = Cosigned By      Initials Name Provider Type    Ora Ryan OT Occupational Therapist                  Therapy Charges for Today       Code Description Service Date Service Provider Modifiers Qty    [id number]  OT THERAPEUTIC ACT EA 15 MIN 1/4/2024 Ora Horowitz OT GO 1    [id number]  OT EVAL LOW  COMPLEXITY 4 1/4/2024 Ora Horowitz, OT GO 1                 Ora Horowitz, OT  1/4/2024

## 2024-01-04 NOTE — THERAPY DISCHARGE NOTE
Acute Care - Speech Language Pathology   Swallow Re-Evaluation/Discharge [facility]    Clinical Swallow Evaluation       Patient Name: Favian Arellano  : 1945  MRN: [id number]  Today's Date: 2024               Admit Date: 2024    Visit Dx:    ICD-10-CM ICD-9-CM   1. Normal pressure hydrocephalus  G91.2 331.5   2. Pneumonia of right lower lobe due to infectious organism  J18.9 486   3. Confusion  R41.0 298.9   4. Permanent atrial fibrillation  I48.21 427.31   5. Mild obesity  E66.9 278.00   6. Essential hypertension  I10 401.9   7. Neuropathy  G62.9 355.9   8. Other disorders of peripheral nervous system  G64 357.89   9. Dysphagia, unspecified type  R13.10 787.20     Patient Active Problem List   Diagnosis    Permanent atrial fibrillation    Tachy-krishna syndrome    Essential hypertension    Lower extremity edema    PARKER (obstructive sleep apnea)    Osteoarthritis    Mild obesity    MG, ocular (myasthenia gravis)    Pain    Hematoma of right thigh    S/P Debridement irrigation and partial closure of cutaneous tissue right knee    Pneumonia    A-fib    Hyperbilirubinemia    Ataxia     Past Medical History:   Diagnosis Date    Benign hypertension     History of eye surgery     Lower extremity edema     Mild obesity     PARKER (obstructive sleep apnea)     requiring CPAP    Osteoarthritis     Paroxysmal atrial fibrillation     Tachy-krishna syndrome      Past Surgical History:   Procedure Laterality Date    CARDIOVERSION      HERNIA REPAIR      INCISION AND DRAINAGE LEG Right 2017    Procedure: INCISION AND DRAINAGE RIGHT KNEE;  Surgeon: Francisco Macias MD;  Location: [facility];  Service:     JOINT REPLACEMENT      NEPHROLITHOTRIPSY PERCUTANEOUS         SLP Recommendation and Plan  SLP Swallowing Diagnosis: suspected pharyngeal dysphagia (24 1400)  SLP Diet Recommendation: other (see comments) (will defer diet decision to MD @ this time) (24 1400)     Monitor for Signs of  Aspiration: notify SLP if any concerns (01/04/24 1400)  Recommended Diagnostics: other (see comments) (rec'd MBS but pt/spouse declined @ this time) (01/04/24 1400)  Swallow Criteria for Skilled Therapeutic Interventions Met: patient/family declined skilled intervention at this time (01/04/24 1400)  Anticipated Discharge Disposition (SLP): home with OP services (01/04/24 1400)                    Anticipated Discharge Disposition (SLP): home with OP services (01/04/24 1400)                                 SWALLOW EVALUATION (last 72 hours)       SLP Adult Swallow Evaluation       Row Name 01/04/24 1400 01/03/24 1115                Rehab Evaluation    Document Type re-evaluation  -CH evaluation  -MP       Subjective Information no complaints  -CH no complaints  -MP       Patient Observations alert;cooperative  -CH alert;cooperative  -MP       Patient/Family/Caregiver Comments/Observations none present  -CH S/o present  -MP       Patient Effort adequate  -CH good  -MP          General Information    Patient Profile Reviewed yes  -CH yes  -MP       Pertinent History Of Current Problem see prior evaluation  -CH Adm 1/2 w/ RLL PNA. Confusion, loss of balance, gait issues. MRI w/ no acute, possible small old infarct in R occipital lobe. Hx sleep apnea, Afib, prostate ca.  -MP       Current Method of Nutrition regular textures;thin liquids  -CH regular textures;thin liquids  -MP       Precautions/Limitations, Vision WFL  -CH WFL  -MP       Precautions/Limitations, Hearing WFL  -CH WFL  -MP       Prior Level of Function-Communication WFL  -CH WFL  -MP       Prior Level of Function-Swallowing no diet consistency restrictions  -CH no diet consistency restrictions  -MP       Plans/Goals Discussed with patient;agreed upon  -CH patient;spouse/S.O.;agreed upon  -MP       Barriers to Rehab none identified  -CH none identified  -MP       Patient's Goals for Discharge patient did not state  -CH patient did not state  -MP       Family  Goals for Discharge -- family did not state  -MP          Pain    Additional Documentation Pain Scale: FACES Pre/Post-Treatment (Group)  -CH Pain Scale: FACES Pre/Post-Treatment (Group)  -MP          Pain Scale: FACES Pre/Post-Treatment    Pain: FACES Scale, Pretreatment 0-->no hurt  -CH 0-->no hurt  -MP       Posttreatment Pain Rating 0-->no hurt  -CH 0-->no hurt  -MP          Oral Motor Structure and Function    Dentition Assessment natural, present and adequate  -CH natural, present and adequate  -MP       Secretion Management WNL/WFL  -CH WNL/WFL  -MP       Mucosal Quality moist, healthy  -CH moist, healthy  -MP          Oral Musculature and Cranial Nerve Assessment    Oral Motor General Assessment WFL  -CH WFL  -MP          General Eating/Swallowing Observations    Respiratory Support Currently in Use room air  - room air  -       Eating/Swallowing Skills self-fed;fed by SLP  -CH self-fed;fed by SLP  -MP       Positioning During Eating upright in bed  - upright in bed  -       Utensils Used spoon;cup;straw  - spoon;cup;straw  -MP       Consistencies Trialed thin liquids;pureed;regular textures  - thin liquids;pureed;regular textures  -MP          Respiratory    Respiratory Status WFL;room air  - --          Clinical Swallow Eval    Pharyngeal Phase suspected pharyngeal impairment  - suspected pharyngeal impairment  -       Clinical Swallow Evaluation Summary Delayed cough after trials of thin by cup and straw. Delayed cough/throat clear following completion of evaluation. Patient continues to decline instrumental evaluation and any alteration in diet. Patient educated regarding signs of aspiration, risk for aspiration and that aspiration is possibly the reason for his current pneumonia. Patient voiced understanding but continues to decline instrumental evaluation or diet changes. SLP to sign off at this time.  -CH Intermittent coughing t/o PO. Difficult to determine if r/t PO. Discussed w/ pt  & spouse completing MBS to r/o aspiration. However, pt declined MBS @ this time. Notified RN & MD. SLP will f/u.  -MP          Pharyngeal Phase Concerns    Pharyngeal Phase Concerns cough;throat clear  - cough  -MP       Cough other (see comments)  following completion of evaluation  - other (see comments)  t/o PO  -MP       Throat Clear thin  - --          Swallowing Quality of Life Assessment    Patient/family preferences Avoid thickened liquids;Avoid diet modification  - --       Education and counseling provided Signs of aspiration;Risks of aspiration;Oral care recommendations and rationale  - --          SLP Evaluation Clinical Impression    SLP Swallowing Diagnosis suspected pharyngeal dysphagia  - suspected pharyngeal dysphagia  -       Functional Impact risk of aspiration/pneumonia  - risk of aspiration/pneumonia  -MP       Swallow Criteria for Skilled Therapeutic Interventions Met patient/family declined skilled intervention at this time  - patient/family declined skilled intervention at this time;other (see comments)  will f/u tomorrow to further discuss  -          Recommendations    Predicted Duration Therapy Intervention (Days) -- until discharge  -       SLP Diet Recommendation other (see comments)  will defer diet decision to MD @ this time  - other (see comments)  will defer diet decision to MD @ this time  -MP       Recommended Diagnostics other (see comments)  rec'd MBS but pt/spouse declined @ this time  - other (see comments)  rec'd MBS but pt/spouse declined @ this time  -       Recommended Precautions and Strategies general aspiration precautions  - general aspiration precautions  -       Oral Care Recommendations Oral Care BID/PRN;Toothbrush  - Oral Care BID/PRN;Toothbrush  -       SLP Rec. for Method of Medication Administration as tolerated  -CH as tolerated  -       Monitor for Signs of Aspiration notify SLP if any concerns  - notify SLP if any  concerns  -MP       Anticipated Discharge Disposition (SLP) home with OP services  -CH home with OP services  -MP                 User Key  (r) = Recorded By, (t) = Taken By, (c) = Cosigned By      Initials Name Effective Dates    MP Storm Dinero MS CCC-SLP 12/28/21 -      Kat Madden MS CCC-SLP 06/16/21 -                     EDUCATION  The patient has been educated in the following areas:   Dysphagia (Swallowing Impairment) Oral Care/Hydration.                 Time Calculation:    Time Calculation- SLP       Row Name 01/04/24 1444             Time Calculation- SLP    SLP Start Time 1400  -CH      SLP Received On 01/04/24  -CH         Untimed Charges    92876-PB Eval Oral Pharyng Swallow Minutes 40  -CH         Total Minutes    Untimed Charges Total Minutes 40  -CH       Total Minutes 40  -CH                User Key  (r) = Recorded By, (t) = Taken By, (c) = Cosigned By      Initials Name Provider Type     Kat Madden MS CCC-SLP Speech and Language Pathologist                    Therapy Charges for Today       Code Description Service Date Service Provider Modifiers Qty    [id number] HC ST EVAL ORAL PHARYNG SWALLOW 3 1/4/2024 Kat Madden MS CCC-SLP GN 1                 SLP Discharge Summary  Anticipated Discharge Disposition (SLP): home with OP services    Kat Madden MS CCC-SLP  1/4/2024

## 2024-01-05 ENCOUNTER — APPOINTMENT (OUTPATIENT)
Dept: GENERAL RADIOLOGY | Facility: HOSPITAL | Age: 79
End: 2024-01-05
Payer: MEDICARE

## 2024-01-05 LAB
ALBUMIN SERPL ELPH-MCNC: 3 G/DL (ref 2.9–4.4)
ALBUMIN/GLOB SERPL: 1.1 {RATIO} (ref 0.7–1.7)
ALPHA1 GLOB SERPL ELPH-MCNC: 0.3 G/DL (ref 0–0.4)
ALPHA2 GLOB SERPL ELPH-MCNC: 0.6 G/DL (ref 0.4–1)
ANION GAP SERPL CALCULATED.3IONS-SCNC: 8 MMOL/L (ref 5–15)
B-GLOBULIN SERPL ELPH-MCNC: 0.7 G/DL (ref 0.7–1.3)
BACTERIA SPEC AEROBE CULT: ABNORMAL
BACTERIA SPEC RESP CULT: NORMAL
BUN SERPL-MCNC: 14 MG/DL (ref 8–23)
BUN/CREAT SERPL: 20 (ref 7–25)
CALCIUM SPEC-SCNC: 8.4 MG/DL (ref 8.6–10.5)
CHLORIDE SERPL-SCNC: 96 MMOL/L (ref 98–107)
CO2 SERPL-SCNC: 26 MMOL/L (ref 22–29)
CREAT SERPL-MCNC: 0.7 MG/DL (ref 0.76–1.27)
DEPRECATED RDW RBC AUTO: 44.8 FL (ref 37–54)
EGFRCR SERPLBLD CKD-EPI 2021: 94.9 ML/MIN/1.73
ERYTHROCYTE [DISTWIDTH] IN BLOOD BY AUTOMATED COUNT: 12.9 % (ref 12.3–15.4)
GAMMA GLOB SERPL ELPH-MCNC: 1 G/DL (ref 0.4–1.8)
GLOBULIN SER CALC-MCNC: 2.7 G/DL (ref 2.2–3.9)
GLUCOSE SERPL-MCNC: 106 MG/DL (ref 65–99)
GRAM STN SPEC: ABNORMAL
GRAM STN SPEC: NORMAL
HCT VFR BLD AUTO: 38.5 % (ref 37.5–51)
HGB BLD-MCNC: 13.3 G/DL (ref 13–17.7)
ISOLATED FROM: ABNORMAL
LABORATORY COMMENT REPORT: ABNORMAL
M PROTEIN SERPL ELPH-MCNC: ABNORMAL G/DL
MCH RBC QN AUTO: 32.6 PG (ref 26.6–33)
MCHC RBC AUTO-ENTMCNC: 34.5 G/DL (ref 31.5–35.7)
MCV RBC AUTO: 94.4 FL (ref 79–97)
NT-PROBNP SERPL-MCNC: 1706 PG/ML (ref 0–1800)
PLATELET # BLD AUTO: 248 10*3/MM3 (ref 140–450)
PMV BLD AUTO: 9.6 FL (ref 6–12)
POTASSIUM SERPL-SCNC: 3.7 MMOL/L (ref 3.5–5.2)
PROT PATTERN SERPL ELPH-IMP: ABNORMAL
PROT SERPL-MCNC: 5.7 G/DL (ref 6–8.5)
RBC # BLD AUTO: 4.08 10*6/MM3 (ref 4.14–5.8)
SODIUM SERPL-SCNC: 130 MMOL/L (ref 136–145)
WBC NRBC COR # BLD AUTO: 9.55 10*3/MM3 (ref 3.4–10.8)

## 2024-01-05 PROCEDURE — 71045 X-RAY EXAM CHEST 1 VIEW: CPT

## 2024-01-05 PROCEDURE — 87205 SMEAR GRAM STAIN: CPT | Performed by: STUDENT IN AN ORGANIZED HEALTH CARE EDUCATION/TRAINING PROGRAM

## 2024-01-05 PROCEDURE — 85027 COMPLETE CBC AUTOMATED: CPT | Performed by: STUDENT IN AN ORGANIZED HEALTH CARE EDUCATION/TRAINING PROGRAM

## 2024-01-05 PROCEDURE — 83880 ASSAY OF NATRIURETIC PEPTIDE: CPT | Performed by: STUDENT IN AN ORGANIZED HEALTH CARE EDUCATION/TRAINING PROGRAM

## 2024-01-05 PROCEDURE — 25010000002 BUMETANIDE PER 0.5 MG: Performed by: STUDENT IN AN ORGANIZED HEALTH CARE EDUCATION/TRAINING PROGRAM

## 2024-01-05 PROCEDURE — 99232 SBSQ HOSP IP/OBS MODERATE 35: CPT | Performed by: STUDENT IN AN ORGANIZED HEALTH CARE EDUCATION/TRAINING PROGRAM

## 2024-01-05 PROCEDURE — 80048 BASIC METABOLIC PNL TOTAL CA: CPT | Performed by: STUDENT IN AN ORGANIZED HEALTH CARE EDUCATION/TRAINING PROGRAM

## 2024-01-05 PROCEDURE — 25010000002 PIPERACILLIN SOD-TAZOBACTAM PER 1 G: Performed by: STUDENT IN AN ORGANIZED HEALTH CARE EDUCATION/TRAINING PROGRAM

## 2024-01-05 RX ORDER — TORSEMIDE 20 MG/1
20 TABLET ORAL DAILY
Status: DISCONTINUED | OUTPATIENT
Start: 2024-01-06 | End: 2024-01-07 | Stop reason: HOSPADM

## 2024-01-05 RX ORDER — BUMETANIDE 0.25 MG/ML
2 INJECTION INTRAMUSCULAR; INTRAVENOUS ONCE
Status: COMPLETED | OUTPATIENT
Start: 2024-01-05 | End: 2024-01-05

## 2024-01-05 RX ORDER — BENZONATATE 100 MG/1
100 CAPSULE ORAL 3 TIMES DAILY PRN
Status: DISCONTINUED | OUTPATIENT
Start: 2024-01-05 | End: 2024-01-07 | Stop reason: HOSPADM

## 2024-01-05 RX ADMIN — PIPERACILLIN SODIUM AND TAZOBACTAM SODIUM 4.5 G: 4; .5 INJECTION, SOLUTION INTRAVENOUS at 09:04

## 2024-01-05 RX ADMIN — DOXYCYCLINE 100 MG: 100 INJECTION, POWDER, LYOPHILIZED, FOR SOLUTION INTRAVENOUS at 08:34

## 2024-01-05 RX ADMIN — THIAMINE HCL TAB 100 MG 100 MG: 100 TAB at 08:31

## 2024-01-05 RX ADMIN — FOLIC ACID 1 MG: 1 TABLET ORAL at 08:31

## 2024-01-05 RX ADMIN — OXYBUTYNIN CHLORIDE 5 MG: 5 TABLET ORAL at 08:31

## 2024-01-05 RX ADMIN — DEXTROMETHORPHAN POLISTIREX 60 MG: 30 SUSPENSION ORAL at 08:31

## 2024-01-05 RX ADMIN — SERTRALINE HYDROCHLORIDE 50 MG: 50 TABLET ORAL at 08:31

## 2024-01-05 RX ADMIN — PIPERACILLIN SODIUM AND TAZOBACTAM SODIUM 4.5 G: 4; .5 INJECTION, SOLUTION INTRAVENOUS at 20:53

## 2024-01-05 RX ADMIN — BENZONATATE 100 MG: 100 CAPSULE ORAL at 09:03

## 2024-01-05 RX ADMIN — DOXYCYCLINE 100 MG: 100 INJECTION, POWDER, LYOPHILIZED, FOR SOLUTION INTRAVENOUS at 20:32

## 2024-01-05 RX ADMIN — Medication 5 MG: at 20:52

## 2024-01-05 RX ADMIN — BUMETANIDE 2 MG: 0.25 INJECTION, SOLUTION INTRAMUSCULAR; INTRAVENOUS at 11:07

## 2024-01-05 RX ADMIN — Medication 5 MG: at 01:17

## 2024-01-05 RX ADMIN — Medication 10 ML: at 20:34

## 2024-01-05 RX ADMIN — TORSEMIDE 20 MG: 20 TABLET ORAL at 08:31

## 2024-01-05 RX ADMIN — PIPERACILLIN SODIUM AND TAZOBACTAM SODIUM 4.5 G: 4; .5 INJECTION, SOLUTION INTRAVENOUS at 14:46

## 2024-01-05 RX ADMIN — Medication 1 TABLET: at 08:30

## 2024-01-05 RX ADMIN — BENZONATATE 100 MG: 100 CAPSULE ORAL at 01:16

## 2024-01-05 RX ADMIN — DEXTROMETHORPHAN POLISTIREX 60 MG: 30 SUSPENSION ORAL at 20:32

## 2024-01-05 RX ADMIN — Medication 10 ML: at 08:34

## 2024-01-05 RX ADMIN — BENZONATATE 100 MG: 100 CAPSULE ORAL at 17:11

## 2024-01-05 RX ADMIN — RIVAROXABAN 20 MG: 20 TABLET, FILM COATED ORAL at 08:31

## 2024-01-05 NOTE — PROGRESS NOTES
[facility] Medicine Services  PROGRESS NOTE    Patient Name: Favian Arellano  : 1945  MRN: [id number]    Date of Admission: 2024  Primary Care Physician: Saleem Yang MD    Subjective   Subjective     CC:  Confusion, ataxia    HPI:  Evaluated patient this morning. Patient felt well, had no complaints. Cough has improved. Discussed patient's care with wife present at bedside.      Objective   Objective     Vital Signs:   Temp:  [97.9 °F (36.6 °C)-98.9 °F (37.2 °C)] 98.5 °F (36.9 °C)  Heart Rate:  [] 101  Resp:  [16-24] 18  BP: (143-168)/() 145/82  Flow (L/min):  [2-3] 2     Physical Exam:  Constitutional: Awake, alert, resting comfortably  HENT: NCAT, mucous membranes moist  Respiratory: Crackles present bilaterally throughout upper and lower lung fields, requiring oxygen  Cardiovascular: Mildly tachycardic, no murmurs, rubs, or gallops  Gastrointestinal: Positive bowel sounds, soft, nontender, nondistended  Musculoskeletal: No bilateral ankle edema  Neurologic: Alert and oriented x 3, no focal deficits, speech clear. 5/5 in muscle strength of bilateral upper and lower extremities. Did not evaluate gait.  Skin: No rashes      Results Reviewed:  LAB RESULTS:      Lab 24  0509 24  0559 24  0402 24  1920   WBC 9.55 9.47 11.00* 13.08*   HEMOGLOBIN 13.3 13.1 12.9* 15.1   HEMATOCRIT 38.5 38.1 37.9 43.5   PLATELETS 248 246 215 251   NEUTROS ABS  --   --  8.03* 10.77*   IMMATURE GRANS (ABS)  --   --  0.06* 0.06*   LYMPHS ABS  --   --  1.07 0.64*   MONOS ABS  --   --  1.78* 1.57*   EOS ABS  --   --  0.03 0.01   MCV 94.4 94.1 96.7 95.6   CRP  --   --   --  19.37*   PROCALCITONIN  --   --   --  0.31*   LACTATE  --   --   --  1.5         Lab 24  0509 24  0559 24  0402 24  1920   SODIUM 130* 130* 134* 133*   POTASSIUM 3.7 3.8 4.2 4.0   CHLORIDE 96* 96* 99 96*   CO2 26.0 25.0 25.0 23.0   ANION GAP 8.0 9.0 10.0 14.0   BUN  14 14 17 15   CREATININE 0.70* 0.78 0.89 0.92   EGFR 94.9 91.9 88.3 85.7   GLUCOSE 106* 115* 121* 119*   CALCIUM 8.4* 8.2* 8.3* 8.8   MAGNESIUM  --   --  2.3 2.3   PHOSPHORUS  --   --   --  2.3*   HEMOGLOBIN A1C  --   --  4.80  --    TSH  --   --   --  0.977         Lab 01/03/24  1625 01/03/24  0402 01/02/24  1920   TOTAL PROTEIN  --  5.4* 7.2   ALBUMIN 3.0 3.4* 4.0   GLOBULIN  --  2.0 3.2   ALT (SGPT)  --  11 14   AST (SGOT)  --  26 27   BILIRUBIN  --  2.3* 2.4*   ALK PHOS  --  70 89   LIPASE  --   --  31         Lab 01/05/24  0509 01/02/24  1920   PROBNP 1,706.0  --    HSTROP T  --  22*             Lab 01/03/24  0402   FOLATE >20.00   VITAMIN B 12 1,000*         Lab 01/02/24  2134   PH, ARTERIAL 7.463*   PCO2, ARTERIAL 35.8   PO2 ART 70.8*   FIO2 21   HCO3 ART 25.6   BASE EXCESS ART 2.1*   CARBOXYHEMOGLOBIN 2.0     Brief Urine Lab Results  (Last result in the past 365 days)        Color   Clarity   Blood   Leuk Est   Nitrite   Protein   CREAT   Urine HCG        01/02/24 1922 Yellow   Clear   Small (1+)   Trace   Negative   30 mg/dL (1+)                   Microbiology Results Abnormal       Procedure Component Value - Date/Time    Respiratory Culture - Sputum, Cough [[id number]] Collected: 01/05/24 1115    Lab Status: Final result Specimen: Sputum from Cough Updated: 01/05/24 1341     Respiratory Culture Rejected     Gram Stain Rare (1+) Epithelial cells per low power field      No WBCs per low power field      No organisms seen    Narrative:      Specimen rejected due to oropharyngeal contamination. Please reorder and recollect specimen if clinically necessary.    Blood Culture - Blood, Arm, Right [[id number]]  (Normal) Collected: 01/02/24 2143    Lab Status: Preliminary result Specimen: Blood from Arm, Right Updated: 01/04/24 2231     Blood Culture No growth at 2 days    Urine Culture - Urine, Urine, Clean Catch [[id number]] Collected: 01/02/24 1922    Lab Status: Final result Specimen: Urine, Clean Catch Updated:  01/04/24 1042     Urine Culture <10,000 CFU/mL Normal Urogenital Kathy    Narrative:      Colonization of the urinary tract without infection is common. Treatment is discouraged unless the patient is symptomatic, pregnant, or undergoing an invasive urologic procedure.    Blood Culture ID, PCR - Blood, Arm, Left [[id number]] Collected: 01/02/24 1920    Lab Status: Final result Specimen: Blood from Arm, Left Updated: 01/04/24 0138     BCID, PCR Negative by BCID PCR. Culture to Follow.     BOTTLE TYPE Aerobic Bottle    Respiratory Culture - Sputum, Cough [[id number]] Collected: 01/03/24 1630    Lab Status: Final result Specimen: Sputum from Cough Updated: 01/03/24 2208     Respiratory Culture Rejected     Gram Stain Few (2+) WBCs per low power field      Rare (1+) Epithelial cells per low power field      Rare (1+) Gram positive cocci    Narrative:      Specimen rejected due to oropharyngeal contamination. Please reorder and recollect specimen if clinically necessary.    MRSA Screen, PCR (Inpatient) - Swab, Nares [[id number]]  (Normal) Collected: 01/03/24 0943    Lab Status: Final result Specimen: Swab from Nares Updated: 01/03/24 1238     MRSA PCR Negative    Narrative:      The negative predictive value of this diagnostic test is high and should only be used to consider de-escalating anti-MRSA therapy. A positive result may indicate colonization with MRSA and must be correlated clinically.  MRSA Negative    Legionella Antigen, Urine - Urine, Urine, Clean Catch [[id number]]  (Normal) Collected: 01/02/24 1922    Lab Status: Final result Specimen: Urine, Clean Catch Updated: 01/03/24 0945     LEGIONELLA ANTIGEN, URINE Negative    S. Pneumo Ag Urine or CSF - Urine, Urine, Clean Catch [[id number]]  (Normal) Collected: 01/02/24 1922    Lab Status: Final result Specimen: Urine, Clean Catch Updated: 01/03/24 0945     Strep Pneumo Ag Negative            XR Chest 1 View    Result Date: 1/5/2024  XR CHEST 1 VW Date of Exam: 1/5/2024  1:18 AM EST Indication: sob Comparison: CT chest 1/3/2024 and chest radiograph 1/2/2024. Findings: There is increasing consolidated pneumonia in the right mid and lower lung. There is new left basilar airspace disease although evaluation is limited due to positioning and technique. There is no pneumothorax or definite pleural effusion. The heart is enlarged. Pulmonary vasculature is obscured on the right, normal on the left.     Impression: Impression: Increasing consolidated pneumonia in the right mid and lower lung and new left basilar airspace disease. Electronically Signed: Mayco Coelho MD  1/5/2024 1:39 AM EST  Workstation ID: [id number]     Results for orders placed during the hospital encounter of 10/26/23    Adult Transthoracic Echo Complete w/ Color, Spectral and Contrast if necessary per protocol    Interpretation Summary    Left ventricular systolic function is normal. Estimated left ventricular EF = 55%    Left ventricular diastolic function is consistent with (grade I) impaired relaxation.    Trace mitral regurgitation.    Trace to mild tricuspid regurgitation.    Calculated right ventricular systolic pressure from tricuspid regurgitation is 17 mmHg.      Current medications:  Scheduled Meds:dextromethorphan polistirex ER, 60 mg, Oral, Q12H  doxycycline, 100 mg, Intravenous, Q12H  folic acid, 1 mg, Oral, Daily  multivitamin with minerals, 1 tablet, Oral, Daily  oxybutynin, 5 mg, Oral, Daily  piperacillin-tazobactam, 4.5 g, Intravenous, Q6H  rivaroxaban, 20 mg, Oral, Daily  sertraline, 50 mg, Oral, Daily  sodium chloride, 10 mL, Intravenous, Q12H  thiamine, 100 mg, Oral, Daily  [START ON 1/6/2024] torsemide, 20 mg, Oral, Daily      Continuous Infusions:     PRN Meds:.  acetaminophen    benzonatate    Calcium Replacement - Follow Nurse / BPA Driven Protocol    HYDROcodone-acetaminophen    ipratropium-albuterol    LORazepam    LORazepam **OR** midazolam **OR** LORazepam **OR** midazolam **OR** midazolam  **OR** midazolam    Magnesium Standard Dose Replacement - Follow Nurse / BPA Driven Protocol    melatonin    nitroglycerin    ondansetron    Phosphorus Replacement - Follow Nurse / BPA Driven Protocol    Potassium Replacement - Follow Nurse / BPA Driven Protocol    sodium chloride    sodium chloride    Assessment & Plan   Assessment & Plan     Active Hospital Problems    Diagnosis  POA    **Ataxia [R27.0]  Yes      Resolved Hospital Problems   No resolved problems to display.        Brief Hospital Course to date:  Favian Arellano is a 78 y.o. male with past medical history of permanent atrial fibrillation, hypertension, PARKER, tachybradycardia syndrome who presented with complaints of confusion and ataxia. Was found to be positive for rhinovirus/enterovirus with right lower lobe pneumonia on admission.    This patient's problems and plans were partially entered by my partner and updated as appropriate by me 01/05/24.    RLL pneumonia  Rhinovirus/enterovirus infection  Leukocytosis  -Respiratory PCR positive result for human rhinovirus/enterovirus.  -1/2 blood cultures positive for micrococcus, probable contaminant, negative BCID  -CT chest with evidence of right lower lobe pneumonia  -Repeat CXR with increasing consolidated pneumonia in right mid and lower lung fields  -Will broaden antibiotic coverage to IV Zosyn with doxycycline for now. DuoNebs PRN. Supportive care with antitussives. Repeat blood cultures x 2 in process. Daily CBC.    Hypoxia  Pulmonary edema  -proBNP upper end of normal, with crackles on exam  -Echo 10/2023 with EF 55%, grade 1 diastolic dysfunction, trace to mild TR, trace MR  -Gave Bumex 2mg x1 today. Continue home torsemide.     Confusion, with ataxia and hx urinary incontinence  -Likely multifactorial, confusion could be explained by active infection, may also have early cognitive decline complicated by active alcohol use; urinary incontinence could be secondary to prostate cancer and  oxybutynin  -MRI brain without acute stroke, with diffuse parenchymal volume loss  -Neurology evaluated, recommended outpatient EMG/NCS. Will check B12, folate, A1c, SPEP. Follow-up with outpatient neurology in 1 month.    Mild hyponatremia  -Continue to monitor with daily BMP.    Alcohol use   -wife reports patient is drinking at least 4-6 beers per day, much more over the holidays  -Will monitor closely here on CIWA protocol.     Permanent atrial fibrillation  -No antiarrhythmics along his home regimen; can add on as needed. Follows with cardiology.  -Continue Xarelto from home regimen.  -Continue telemetry.    Hypertension  -Continue home torsemide. Follows with Cardiology.    Obstructive sleep apnea  -He may use his home CPAP machine or can be provided a machine to use here by respiratory therapy.    Anxiety/depression  -Continue home sertraline.     Prostate cancer   -Under active surveillance as outpatient. Continue outpatient follow-up with urology.    Expected Discharge Location and Transportation: Likely home  Expected Discharge   Expected Discharge Date: 1/7/2024; Expected Discharge Time:      DVT prophylaxis:  Medical DVT prophylaxis orders are present.     AM-PAC 6 Clicks Score (PT): 21 (01/04/24 0830)    CODE STATUS:   Code Status and Medical Interventions:   Ordered at: 01/03/24 0042     Level Of Support Discussed With:    Patient     Code Status (Patient has no pulse and is not breathing):    CPR (Attempt to Resuscitate)     Medical Interventions (Patient has pulse or is breathing):    Full Support       Kylie Schroeder, DO  01/05/24

## 2024-01-05 NOTE — CASE MANAGEMENT/SOCIAL WORK
Continued Stay Note  [facility]     Patient Name: Favian Arellano  MRN: [id number]  Today's Date: 1/5/2024    Admit Date: 1/2/2024    Plan: TBD   Discharge Plan       Row Name 01/05/24 1505       Plan    Plan TBD    Plan Comments Discussed in MDR. Mr. Arellano is not medically ready for discharge today. Continues on IV antibiotics and waiting on respiratory culture. CM will continue to follow.    Final Discharge Disposition Code 30 - still a patient                   Discharge Codes    No documentation.                 Expected Discharge Date and Time       Expected Discharge Date Expected Discharge Time    Jan 5, 2024               Siria Hinton RN     89

## 2024-01-06 LAB
ANION GAP SERPL CALCULATED.3IONS-SCNC: 12 MMOL/L (ref 5–15)
BUN SERPL-MCNC: 13 MG/DL (ref 8–23)
BUN/CREAT SERPL: 17.6 (ref 7–25)
CALCIUM SPEC-SCNC: 8.2 MG/DL (ref 8.6–10.5)
CHLORIDE SERPL-SCNC: 95 MMOL/L (ref 98–107)
CO2 SERPL-SCNC: 28 MMOL/L (ref 22–29)
CREAT SERPL-MCNC: 0.74 MG/DL (ref 0.76–1.27)
DEPRECATED RDW RBC AUTO: 44.5 FL (ref 37–54)
EGFRCR SERPLBLD CKD-EPI 2021: 93.3 ML/MIN/1.73
ERYTHROCYTE [DISTWIDTH] IN BLOOD BY AUTOMATED COUNT: 12.8 % (ref 12.3–15.4)
GLUCOSE SERPL-MCNC: 109 MG/DL (ref 65–99)
HCT VFR BLD AUTO: 40.6 % (ref 37.5–51)
HGB BLD-MCNC: 14 G/DL (ref 13–17.7)
MAGNESIUM SERPL-MCNC: 2.1 MG/DL (ref 1.6–2.4)
MCH RBC QN AUTO: 32.4 PG (ref 26.6–33)
MCHC RBC AUTO-ENTMCNC: 34.5 G/DL (ref 31.5–35.7)
MCV RBC AUTO: 94 FL (ref 79–97)
PLATELET # BLD AUTO: 293 10*3/MM3 (ref 140–450)
PMV BLD AUTO: 9.4 FL (ref 6–12)
POTASSIUM SERPL-SCNC: 3.3 MMOL/L (ref 3.5–5.2)
POTASSIUM SERPL-SCNC: 3.6 MMOL/L (ref 3.5–5.2)
RBC # BLD AUTO: 4.32 10*6/MM3 (ref 4.14–5.8)
SODIUM SERPL-SCNC: 135 MMOL/L (ref 136–145)
WBC NRBC COR # BLD AUTO: 8.26 10*3/MM3 (ref 3.4–10.8)

## 2024-01-06 PROCEDURE — 83521 IG LIGHT CHAINS FREE EACH: CPT | Performed by: INTERNAL MEDICINE

## 2024-01-06 PROCEDURE — 82784 ASSAY IGA/IGD/IGG/IGM EACH: CPT | Performed by: INTERNAL MEDICINE

## 2024-01-06 PROCEDURE — 99222 1ST HOSP IP/OBS MODERATE 55: CPT | Performed by: INTERNAL MEDICINE

## 2024-01-06 PROCEDURE — 80048 BASIC METABOLIC PNL TOTAL CA: CPT | Performed by: STUDENT IN AN ORGANIZED HEALTH CARE EDUCATION/TRAINING PROGRAM

## 2024-01-06 PROCEDURE — 86334 IMMUNOFIX E-PHORESIS SERUM: CPT | Performed by: INTERNAL MEDICINE

## 2024-01-06 PROCEDURE — 84132 ASSAY OF SERUM POTASSIUM: CPT | Performed by: HOSPITALIST

## 2024-01-06 PROCEDURE — 25010000002 PIPERACILLIN SOD-TAZOBACTAM PER 1 G: Performed by: STUDENT IN AN ORGANIZED HEALTH CARE EDUCATION/TRAINING PROGRAM

## 2024-01-06 PROCEDURE — 83735 ASSAY OF MAGNESIUM: CPT | Performed by: STUDENT IN AN ORGANIZED HEALTH CARE EDUCATION/TRAINING PROGRAM

## 2024-01-06 PROCEDURE — 84165 PROTEIN E-PHORESIS SERUM: CPT | Performed by: INTERNAL MEDICINE

## 2024-01-06 PROCEDURE — 85027 COMPLETE CBC AUTOMATED: CPT | Performed by: STUDENT IN AN ORGANIZED HEALTH CARE EDUCATION/TRAINING PROGRAM

## 2024-01-06 PROCEDURE — 84155 ASSAY OF PROTEIN SERUM: CPT | Performed by: INTERNAL MEDICINE

## 2024-01-06 PROCEDURE — 99231 SBSQ HOSP IP/OBS SF/LOW 25: CPT | Performed by: HOSPITALIST

## 2024-01-06 RX ORDER — BENZOCAINE/MENTHOL 6 MG-10 MG
1 LOZENGE MUCOUS MEMBRANE 4 TIMES DAILY PRN
Status: DISCONTINUED | OUTPATIENT
Start: 2024-01-06 | End: 2024-01-07 | Stop reason: HOSPADM

## 2024-01-06 RX ORDER — MULTIVIT AND MINERALS-FERROUS GLUCONATE 9 MG IRON/15 ML ORAL LIQUID 9 MG/15 ML
15 LIQUID (ML) ORAL DAILY
Status: DISCONTINUED | OUTPATIENT
Start: 2024-01-06 | End: 2024-01-07 | Stop reason: HOSPADM

## 2024-01-06 RX ORDER — GUAIFENESIN 600 MG/1
600 TABLET, EXTENDED RELEASE ORAL EVERY 12 HOURS SCHEDULED
Status: DISCONTINUED | OUTPATIENT
Start: 2024-01-06 | End: 2024-01-07 | Stop reason: HOSPADM

## 2024-01-06 RX ORDER — POTASSIUM CHLORIDE 20 MEQ/1
40 TABLET, EXTENDED RELEASE ORAL EVERY 4 HOURS
Status: COMPLETED | OUTPATIENT
Start: 2024-01-06 | End: 2024-01-06

## 2024-01-06 RX ADMIN — BENZONATATE 100 MG: 100 CAPSULE ORAL at 06:43

## 2024-01-06 RX ADMIN — PIPERACILLIN SODIUM AND TAZOBACTAM SODIUM 4.5 G: 4; .5 INJECTION, SOLUTION INTRAVENOUS at 15:06

## 2024-01-06 RX ADMIN — POTASSIUM CHLORIDE 40 MEQ: 1500 TABLET, EXTENDED RELEASE ORAL at 10:49

## 2024-01-06 RX ADMIN — SERTRALINE HYDROCHLORIDE 50 MG: 50 TABLET ORAL at 09:51

## 2024-01-06 RX ADMIN — DOXYCYCLINE 100 MG: 100 INJECTION, POWDER, LYOPHILIZED, FOR SOLUTION INTRAVENOUS at 10:49

## 2024-01-06 RX ADMIN — DOXYCYCLINE 100 MG: 100 INJECTION, POWDER, LYOPHILIZED, FOR SOLUTION INTRAVENOUS at 20:39

## 2024-01-06 RX ADMIN — TORSEMIDE 20 MG: 20 TABLET ORAL at 09:51

## 2024-01-06 RX ADMIN — THIAMINE HCL TAB 100 MG 100 MG: 100 TAB at 09:51

## 2024-01-06 RX ADMIN — PIPERACILLIN SODIUM AND TAZOBACTAM SODIUM 4.5 G: 4; .5 INJECTION, SOLUTION INTRAVENOUS at 20:39

## 2024-01-06 RX ADMIN — Medication 10 ML: at 20:39

## 2024-01-06 RX ADMIN — Medication 10 ML: at 09:52

## 2024-01-06 RX ADMIN — RIVAROXABAN 20 MG: 20 TABLET, FILM COATED ORAL at 09:51

## 2024-01-06 RX ADMIN — GUAIFENESIN 600 MG: 600 TABLET, EXTENDED RELEASE ORAL at 10:49

## 2024-01-06 RX ADMIN — GUAIFENESIN 600 MG: 600 TABLET, EXTENDED RELEASE ORAL at 20:39

## 2024-01-06 RX ADMIN — PIPERACILLIN SODIUM AND TAZOBACTAM SODIUM 4.5 G: 4; .5 INJECTION, SOLUTION INTRAVENOUS at 09:51

## 2024-01-06 RX ADMIN — OXYBUTYNIN CHLORIDE 5 MG: 5 TABLET ORAL at 09:51

## 2024-01-06 RX ADMIN — DEXTROMETHORPHAN POLISTIREX 60 MG: 30 SUSPENSION ORAL at 09:51

## 2024-01-06 RX ADMIN — POTASSIUM CHLORIDE 40 MEQ: 1500 TABLET, EXTENDED RELEASE ORAL at 15:06

## 2024-01-06 RX ADMIN — PIPERACILLIN SODIUM AND TAZOBACTAM SODIUM 4.5 G: 4; .5 INJECTION, SOLUTION INTRAVENOUS at 02:47

## 2024-01-06 RX ADMIN — FOLIC ACID 1 MG: 1 TABLET ORAL at 09:51

## 2024-01-06 RX ADMIN — ACETAMINOPHEN 650 MG: 325 TABLET ORAL at 15:32

## 2024-01-06 RX ADMIN — ASCORBIC ACID, THIAMINE, RIBOFLAVIN, NIACINAMIDE, PYRIDOXINE, FOLIC ACID, COBALAMIN, BIOTIN, PANTOTHENIC ACID 15 ML: 100; 1.5; 1.7; 20; 10; 1; 6; 300; 1 TABLET, COATED ORAL at 12:52

## 2024-01-06 RX ADMIN — DEXTROMETHORPHAN POLISTIREX 60 MG: 30 SUSPENSION ORAL at 20:39

## 2024-01-06 NOTE — PROGRESS NOTES
[facility] Medicine Services  PROGRESS NOTE    Patient Name: Favian Arellano  : 1945  MRN: [id number]    Date of Admission: 2024  Primary Care Physician: Saleem Yang MD    Subjective   Subjective     CC:  Confusion, ataxia    HPI:  Evaluated patient this morning. Patient felt well, had no complaints. Cough has improved. Discussed patient's care with wife present at bedside.      Objective   Objective     Vital Signs:   Temp:  [97.9 °F (36.6 °C)-98.8 °F (37.1 °C)] 98.8 °F (37.1 °C)  Heart Rate:  [] 92  Resp:  [16-18] 18  BP: (141-148)/() 148/101  Flow (L/min):  [0.5-2] 0.5     Physical Exam:  Constitutional: Awake, alert, resting comfortably  HENT: NCAT, mucous membranes moist  Respiratory: Crackles present bilaterally throughout upper and lower lung fields, requiring oxygen  Cardiovascular: Mildly tachycardic, no murmurs, rubs, or gallops  Gastrointestinal: Positive bowel sounds, soft, nontender, nondistended  Musculoskeletal: No bilateral ankle edema  Neurologic: Alert and oriented x 3, no focal deficits, speech clear. 5/5 in muscle strength of bilateral upper and lower extremities. Did not evaluate gait.  Skin: No rashes      Results Reviewed:  LAB RESULTS:      Lab 24  0529 24  0509 24  0559 24  0402 24  1920   WBC 8.26 9.55 9.47 11.00* 13.08*   HEMOGLOBIN 14.0 13.3 13.1 12.9* 15.1   HEMATOCRIT 40.6 38.5 38.1 37.9 43.5   PLATELETS 293 248 246 215 251   NEUTROS ABS  --   --   --  8.03* 10.77*   IMMATURE GRANS (ABS)  --   --   --  0.06* 0.06*   LYMPHS ABS  --   --   --  1.07 0.64*   MONOS ABS  --   --   --  1.78* 1.57*   EOS ABS  --   --   --  0.03 0.01   MCV 94.0 94.4 94.1 96.7 95.6   CRP  --   --   --   --  19.37*   PROCALCITONIN  --   --   --   --  0.31*   LACTATE  --   --   --   --  1.5         Lab 24  0529 24  0509 24  0559 24  0402 24  1920   SODIUM 135* 130* 130* 134* 133*   POTASSIUM  3.3* 3.7 3.8 4.2 4.0   CHLORIDE 95* 96* 96* 99 96*   CO2 28.0 26.0 25.0 25.0 23.0   ANION GAP 12.0 8.0 9.0 10.0 14.0   BUN 13 14 14 17 15   CREATININE 0.74* 0.70* 0.78 0.89 0.92   EGFR 93.3 94.9 91.9 88.3 85.7   GLUCOSE 109* 106* 115* 121* 119*   CALCIUM 8.2* 8.4* 8.2* 8.3* 8.8   MAGNESIUM 2.1  --   --  2.3 2.3   PHOSPHORUS  --   --   --   --  2.3*   HEMOGLOBIN A1C  --   --   --  4.80  --    TSH  --   --   --   --  0.977         Lab 01/03/24  1625 01/03/24  0402 01/02/24 1920   TOTAL PROTEIN  --  5.4* 7.2   ALBUMIN 3.0 3.4* 4.0   GLOBULIN  --  2.0 3.2   ALT (SGPT)  --  11 14   AST (SGOT)  --  26 27   BILIRUBIN  --  2.3* 2.4*   ALK PHOS  --  70 89   LIPASE  --   --  31         Lab 01/05/24  0509 01/02/24  1920   PROBNP 1,706.0  --    HSTROP T  --  22*             Lab 01/03/24  0402   FOLATE >20.00   VITAMIN B 12 1,000*         Lab 01/02/24 2134   PH, ARTERIAL 7.463*   PCO2, ARTERIAL 35.8   PO2 ART 70.8*   FIO2 21   HCO3 ART 25.6   BASE EXCESS ART 2.1*   CARBOXYHEMOGLOBIN 2.0     Brief Urine Lab Results  (Last result in the past 365 days)        Color   Clarity   Blood   Leuk Est   Nitrite   Protein   CREAT   Urine HCG        01/02/24 1922 Yellow   Clear   Small (1+)   Trace   Negative   30 mg/dL (1+)                   Microbiology Results Abnormal       Procedure Component Value - Date/Time    Blood Culture - Blood, Arm, Right [[id number]]  (Normal) Collected: 01/04/24 2024    Lab Status: Preliminary result Specimen: Blood from Arm, Right Updated: 01/05/24 2246     Blood Culture No growth at 24 hours    Blood Culture - Blood, Arm, Right [[id number]]  (Normal) Collected: 01/04/24 2024    Lab Status: Preliminary result Specimen: Blood from Arm, Right Updated: 01/05/24 2246     Blood Culture No growth at 24 hours    Blood Culture - Blood, Arm, Right [[id number]]  (Normal) Collected: 01/02/24 2143    Lab Status: Preliminary result Specimen: Blood from Arm, Right Updated: 01/05/24 2231     Blood Culture No growth at 3 days     Respiratory Culture - Sputum, Cough [[id number]] Collected: 01/05/24 1115    Lab Status: Final result Specimen: Sputum from Cough Updated: 01/05/24 1341     Respiratory Culture Rejected     Gram Stain Rare (1+) Epithelial cells per low power field      No WBCs per low power field      No organisms seen    Narrative:      Specimen rejected due to oropharyngeal contamination. Please reorder and recollect specimen if clinically necessary.    Urine Culture - Urine, Urine, Clean Catch [[id number]] Collected: 01/02/24 1922    Lab Status: Final result Specimen: Urine, Clean Catch Updated: 01/04/24 1042     Urine Culture <10,000 CFU/mL Normal Urogenital Kathy    Narrative:      Colonization of the urinary tract without infection is common. Treatment is discouraged unless the patient is symptomatic, pregnant, or undergoing an invasive urologic procedure.    Blood Culture ID, PCR - Blood, Arm, Left [[id number]] Collected: 01/02/24 1920    Lab Status: Final result Specimen: Blood from Arm, Left Updated: 01/04/24 0138     BCID, PCR Negative by BCID PCR. Culture to Follow.     BOTTLE TYPE Aerobic Bottle    Respiratory Culture - Sputum, Cough [[id number]] Collected: 01/03/24 1630    Lab Status: Final result Specimen: Sputum from Cough Updated: 01/03/24 2208     Respiratory Culture Rejected     Gram Stain Few (2+) WBCs per low power field      Rare (1+) Epithelial cells per low power field      Rare (1+) Gram positive cocci    Narrative:      Specimen rejected due to oropharyngeal contamination. Please reorder and recollect specimen if clinically necessary.    MRSA Screen, PCR (Inpatient) - Swab, Nares [[id number]]  (Normal) Collected: 01/03/24 0943    Lab Status: Final result Specimen: Swab from Nares Updated: 01/03/24 1238     MRSA PCR Negative    Narrative:      The negative predictive value of this diagnostic test is high and should only be used to consider de-escalating anti-MRSA therapy. A positive result may indicate  colonization with MRSA and must be correlated clinically.  MRSA Negative    Legionella Antigen, Urine - Urine, Urine, Clean Catch [[id number]]  (Normal) Collected: 01/02/24 1922    Lab Status: Final result Specimen: Urine, Clean Catch Updated: 01/03/24 0945     LEGIONELLA ANTIGEN, URINE Negative    S. Pneumo Ag Urine or CSF - Urine, Urine, Clean Catch [[id number]]  (Normal) Collected: 01/02/24 1922    Lab Status: Final result Specimen: Urine, Clean Catch Updated: 01/03/24 0945     Strep Pneumo Ag Negative            XR Chest 1 View    Result Date: 1/5/2024  XR CHEST 1 VW Date of Exam: 1/5/2024 1:18 AM EST Indication: sob Comparison: CT chest 1/3/2024 and chest radiograph 1/2/2024. Findings: There is increasing consolidated pneumonia in the right mid and lower lung. There is new left basilar airspace disease although evaluation is limited due to positioning and technique. There is no pneumothorax or definite pleural effusion. The heart is enlarged. Pulmonary vasculature is obscured on the right, normal on the left.     Impression: Impression: Increasing consolidated pneumonia in the right mid and lower lung and new left basilar airspace disease. Electronically Signed: Mayco Coelho MD  1/5/2024 1:39 AM EST  Workstation ID: [id number]     Results for orders placed during the hospital encounter of 10/26/23    Adult Transthoracic Echo Complete w/ Color, Spectral and Contrast if necessary per protocol    Interpretation Summary    Left ventricular systolic function is normal. Estimated left ventricular EF = 55%    Left ventricular diastolic function is consistent with (grade I) impaired relaxation.    Trace mitral regurgitation.    Trace to mild tricuspid regurgitation.    Calculated right ventricular systolic pressure from tricuspid regurgitation is 17 mmHg.      Current medications:  Scheduled Meds:dextromethorphan polistirex ER, 60 mg, Oral, Q12H  doxycycline, 100 mg, Intravenous, Q12H  folic acid, 1 mg, Oral,  Daily  multivitamin with minerals, 1 tablet, Oral, Daily  oxybutynin, 5 mg, Oral, Daily  piperacillin-tazobactam, 4.5 g, Intravenous, Q6H  rivaroxaban, 20 mg, Oral, Daily  sertraline, 50 mg, Oral, Daily  sodium chloride, 10 mL, Intravenous, Q12H  thiamine, 100 mg, Oral, Daily  torsemide, 20 mg, Oral, Daily      Continuous Infusions:     PRN Meds:.  acetaminophen    benzonatate    Calcium Replacement - Follow Nurse / BPA Driven Protocol    HYDROcodone-acetaminophen    ipratropium-albuterol    LORazepam    LORazepam **OR** midazolam **OR** LORazepam **OR** midazolam **OR** midazolam **OR** midazolam    Magnesium Standard Dose Replacement - Follow Nurse / BPA Driven Protocol    melatonin    nitroglycerin    ondansetron    Phosphorus Replacement - Follow Nurse / BPA Driven Protocol    Potassium Replacement - Follow Nurse / BPA Driven Protocol    sodium chloride    sodium chloride    Assessment & Plan   Assessment & Plan     Active Hospital Problems    Diagnosis  POA    **Ataxia [R27.0]  Yes      Resolved Hospital Problems   No resolved problems to display.        Brief Hospital Course to date:  Favian Arellano is a 78 y.o. male with past medical history of permanent atrial fibrillation, hypertension, PARKER, tachybradycardia syndrome who presented with complaints of confusion and ataxia. Was found to be positive for rhinovirus/enterovirus with right lower lobe pneumonia on admission.    This patient's problems and plans were partially entered by my partner and updated as appropriate by me 01/06/24.    RLL pneumonia  Rhinovirus/enterovirus infection  Leukocytosis  -Respiratory PCR positive result for human rhinovirus/enterovirus.  -1/2 blood cultures positive for micrococcus, probable contaminant, negative BCID  -CT chest with evidence of right lower lobe pneumonia  -Repeat CXR with increasing consolidated pneumonia in right mid and lower lung fields  -Continue IV Zosyn with doxycycline for now. DuoNebs PRN. Supportive  care with antitussives. Repeat blood cultures x 2 in process. Daily CBC.    Hypoxia. Improving. Down to 2L of NC.   Pulmonary edema  -proBNP upper end of normal, with crackles on exam  -Echo 10/2023 with EF 55%, grade 1 diastolic dysfunction, trace to mild TR, trace MR  - Continue home torsemide.     Confusion, with ataxia and hx urinary incontinence  -Likely multifactorial, confusion could be explained by active infection, may also have early cognitive decline complicated by active alcohol use; urinary incontinence could be secondary to prostate cancer and oxybutynin  -MRI brain without acute stroke, with diffuse parenchymal volume loss  -Neurology evaluated, recommended outpatient EMG/NCS. Will check B12, folate, A1c, SPEP. Follow-up with outpatient neurology in 1 month.    Mild hyponatremia  -Continue to monitor with daily BMP.    Alcohol use   -wife reports patient is drinking at least 4-6 beers per day, much more over the holidays  -Will monitor closely here on CIWA protocol.     Permanent atrial fibrillation  -No antiarrhythmics along his home regimen; can add on as needed. Follows with cardiology.  -Continue Xarelto from home regimen.  -Continue telemetry.    Hypertension  -Continue home torsemide. Follows with Cardiology.    Obstructive sleep apnea  -He may use his home CPAP machine or can be provided a machine to use here by respiratory therapy.    Anxiety/depression  -Continue home sertraline.     Prostate cancer   -Under active surveillance as outpatient. Continue outpatient follow-up with urology.    Abnormal SPEP test: MGUS?  - Oncology consulted.      Expected Discharge Location and Transportation: Likely home  Expected Discharge   Expected Discharge Date: 1/7/2024; Expected Discharge Time:      DVT prophylaxis:  Medical DVT prophylaxis orders are present.     AM-PAC 6 Clicks Score (PT): 21 (01/04/24 0830)    CODE STATUS:   Code Status and Medical Interventions:   Ordered at: 01/03/24 0042     Level Of  Support Discussed With:    Patient     Code Status (Patient has no pulse and is not breathing):    CPR (Attempt to Resuscitate)     Medical Interventions (Patient has pulse or is breathing):    Full Support       Simón Valencia MD  01/06/24

## 2024-01-07 VITALS
TEMPERATURE: 97.5 F | OXYGEN SATURATION: 92 % | HEART RATE: 95 BPM | DIASTOLIC BLOOD PRESSURE: 79 MMHG | BODY MASS INDEX: 33.99 KG/M2 | WEIGHT: 273.37 LBS | SYSTOLIC BLOOD PRESSURE: 132 MMHG | HEIGHT: 75 IN | RESPIRATION RATE: 18 BRPM

## 2024-01-07 LAB
ANION GAP SERPL CALCULATED.3IONS-SCNC: 6 MMOL/L (ref 5–15)
BACTERIA SPEC AEROBE CULT: NORMAL
BUN SERPL-MCNC: 12 MG/DL (ref 8–23)
BUN/CREAT SERPL: 15.6 (ref 7–25)
CALCIUM SPEC-SCNC: 8.7 MG/DL (ref 8.6–10.5)
CHLORIDE SERPL-SCNC: 99 MMOL/L (ref 98–107)
CO2 SERPL-SCNC: 33 MMOL/L (ref 22–29)
CREAT SERPL-MCNC: 0.77 MG/DL (ref 0.76–1.27)
DEPRECATED RDW RBC AUTO: 43.8 FL (ref 37–54)
EGFRCR SERPLBLD CKD-EPI 2021: 92.2 ML/MIN/1.73
ERYTHROCYTE [DISTWIDTH] IN BLOOD BY AUTOMATED COUNT: 12.6 % (ref 12.3–15.4)
GLUCOSE SERPL-MCNC: 109 MG/DL (ref 65–99)
HCT VFR BLD AUTO: 41.3 % (ref 37.5–51)
HGB BLD-MCNC: 14.4 G/DL (ref 13–17.7)
MAGNESIUM SERPL-MCNC: 2.2 MG/DL (ref 1.6–2.4)
MCH RBC QN AUTO: 32.9 PG (ref 26.6–33)
MCHC RBC AUTO-ENTMCNC: 34.9 G/DL (ref 31.5–35.7)
MCV RBC AUTO: 94.3 FL (ref 79–97)
PLATELET # BLD AUTO: 320 10*3/MM3 (ref 140–450)
PMV BLD AUTO: 9 FL (ref 6–12)
POTASSIUM SERPL-SCNC: 3.8 MMOL/L (ref 3.5–5.2)
RBC # BLD AUTO: 4.38 10*6/MM3 (ref 4.14–5.8)
SODIUM SERPL-SCNC: 138 MMOL/L (ref 136–145)
WBC NRBC COR # BLD AUTO: 7.74 10*3/MM3 (ref 3.4–10.8)

## 2024-01-07 PROCEDURE — 94799 UNLISTED PULMONARY SVC/PX: CPT

## 2024-01-07 PROCEDURE — 83735 ASSAY OF MAGNESIUM: CPT | Performed by: STUDENT IN AN ORGANIZED HEALTH CARE EDUCATION/TRAINING PROGRAM

## 2024-01-07 PROCEDURE — 25010000002 PIPERACILLIN SOD-TAZOBACTAM PER 1 G: Performed by: STUDENT IN AN ORGANIZED HEALTH CARE EDUCATION/TRAINING PROGRAM

## 2024-01-07 PROCEDURE — 94664 DEMO&/EVAL PT USE INHALER: CPT

## 2024-01-07 PROCEDURE — 97116 GAIT TRAINING THERAPY: CPT

## 2024-01-07 PROCEDURE — 99239 HOSP IP/OBS DSCHRG MGMT >30: CPT | Performed by: HOSPITALIST

## 2024-01-07 PROCEDURE — 85027 COMPLETE CBC AUTOMATED: CPT | Performed by: STUDENT IN AN ORGANIZED HEALTH CARE EDUCATION/TRAINING PROGRAM

## 2024-01-07 PROCEDURE — 80048 BASIC METABOLIC PNL TOTAL CA: CPT | Performed by: STUDENT IN AN ORGANIZED HEALTH CARE EDUCATION/TRAINING PROGRAM

## 2024-01-07 RX ORDER — FOLIC ACID 1 MG/1
1 TABLET ORAL DAILY
Qty: 30 TABLET | Refills: 0 | Status: SHIPPED | OUTPATIENT
Start: 2024-01-08

## 2024-01-07 RX ORDER — BENZONATATE 100 MG/1
100 CAPSULE ORAL 3 TIMES DAILY PRN
Qty: 15 CAPSULE | Refills: 0 | Status: SHIPPED | OUTPATIENT
Start: 2024-01-07

## 2024-01-07 RX ORDER — ACETAMINOPHEN 325 MG/1
650 TABLET ORAL EVERY 4 HOURS PRN
Qty: 30 TABLET | Refills: 0 | Status: SHIPPED | OUTPATIENT
Start: 2024-01-07 | End: 2024-01-22

## 2024-01-07 RX ORDER — GUAIFENESIN 600 MG/1
600 TABLET, EXTENDED RELEASE ORAL EVERY 12 HOURS SCHEDULED
Qty: 20 TABLET | Refills: 0 | Status: SHIPPED | OUTPATIENT
Start: 2024-01-07 | End: 2024-01-17

## 2024-01-07 RX ORDER — LANOLIN ALCOHOL/MO/W.PET/CERES
100 CREAM (GRAM) TOPICAL DAILY
Qty: 30 TABLET | Refills: 0 | Status: SHIPPED | OUTPATIENT
Start: 2024-01-08 | End: 2024-02-07

## 2024-01-07 RX ORDER — DOXYCYCLINE HYCLATE 100 MG/1
100 CAPSULE ORAL 2 TIMES DAILY
Qty: 6 CAPSULE | Refills: 0 | Status: SHIPPED | OUTPATIENT
Start: 2024-01-07 | End: 2024-01-10

## 2024-01-07 RX ORDER — AMOXICILLIN AND CLAVULANATE POTASSIUM 875; 125 MG/1; MG/1
1 TABLET, FILM COATED ORAL 2 TIMES DAILY
Qty: 6 TABLET | Refills: 0 | Status: SHIPPED | OUTPATIENT
Start: 2024-01-07 | End: 2024-01-10

## 2024-01-07 RX ADMIN — GUAIFENESIN 600 MG: 600 TABLET, EXTENDED RELEASE ORAL at 10:46

## 2024-01-07 RX ADMIN — Medication 5 MG: at 00:18

## 2024-01-07 RX ADMIN — THIAMINE HCL TAB 100 MG 100 MG: 100 TAB at 10:46

## 2024-01-07 RX ADMIN — DEXTROMETHORPHAN POLISTIREX 60 MG: 30 SUSPENSION ORAL at 10:46

## 2024-01-07 RX ADMIN — OXYBUTYNIN CHLORIDE 5 MG: 5 TABLET ORAL at 10:46

## 2024-01-07 RX ADMIN — SERTRALINE HYDROCHLORIDE 50 MG: 50 TABLET ORAL at 10:46

## 2024-01-07 RX ADMIN — IPRATROPIUM BROMIDE AND ALBUTEROL SULFATE 3 ML: 2.5; .5 SOLUTION RESPIRATORY (INHALATION) at 11:17

## 2024-01-07 RX ADMIN — BENZONATATE 100 MG: 100 CAPSULE ORAL at 00:18

## 2024-01-07 RX ADMIN — PIPERACILLIN SODIUM AND TAZOBACTAM SODIUM 4.5 G: 4; .5 INJECTION, SOLUTION INTRAVENOUS at 10:45

## 2024-01-07 RX ADMIN — TORSEMIDE 20 MG: 20 TABLET ORAL at 10:46

## 2024-01-07 RX ADMIN — RIVAROXABAN 20 MG: 20 TABLET, FILM COATED ORAL at 10:46

## 2024-01-07 RX ADMIN — FOLIC ACID 1 MG: 1 TABLET ORAL at 10:46

## 2024-01-07 RX ADMIN — DOXYCYCLINE 100 MG: 100 INJECTION, POWDER, LYOPHILIZED, FOR SOLUTION INTRAVENOUS at 11:59

## 2024-01-07 RX ADMIN — Medication 10 ML: at 10:47

## 2024-01-07 RX ADMIN — PIPERACILLIN SODIUM AND TAZOBACTAM SODIUM 4.5 G: 4; .5 INJECTION, SOLUTION INTRAVENOUS at 02:55

## 2024-01-07 RX ADMIN — ASCORBIC ACID, THIAMINE, RIBOFLAVIN, NIACINAMIDE, PYRIDOXINE, FOLIC ACID, COBALAMIN, BIOTIN, PANTOTHENIC ACID 15 ML: 100; 1.5; 1.7; 20; 10; 1; 6; 300; 1 TABLET, COATED ORAL at 10:46

## 2024-01-07 NOTE — DISCHARGE SUMMARY
[facility] Medicine Services  DISCHARGE SUMMARY    Patient Name: Favian Arellano  : 1945  MRN: [id number]    Date of Admission: 2024  9:08 PM  Date of Discharge:  23  Primary Care Physician: Saleem Yang MD    Consults       Date and Time Order Name Status Description    2024  8:55 AM Inpatient Hematology & Oncology Consult Completed     1/3/2024 12:42 AM Inpatient Neurology Consult General Completed             Hospital Course     Presenting Problem: PNA.    Active Hospital Problems    Diagnosis  POA    **Ataxia [R27.0]  Yes      Resolved Hospital Problems   No resolved problems to display.          Hospital Course:  Favian Arellano is a 78 y.o. male with past medical history of permanent atrial fibrillation, hypertension, PARKER, tachybradycardia syndrome who presented with complaints of confusion and ataxia. Was found to be positive for rhinovirus/enterovirus with right lower lobe pneumonia on admission.     This patient's problems and plans were partially entered by my partner and updated as appropriate by me 24.    Acute hypoxic respiratory failure 2/2 to below.   RLL pneumonia  Rhinovirus/enterovirus infection  Pulmonary edema  -Respiratory PCR positive result for human rhinovirus/enterovirus.  -CT chest with evidence of right lower lobe pneumonia  -Repeat CXR with increasing consolidated pneumonia in right mid and lower lung fields  -Echo 10/2023 with EF 55%, grade 1 diastolic dysfunction, trace to mild TR, trace MR  - Started on IV Zosyn with doxycycline.  - DuoNebs PRN. Supportive care with antitussives.   - Pt was initially needing O2. Able to wean him off to RA.  - He was switched to Oral ABX on discharge.  - Continue home torsemide.     Confusion, with ataxia and hx urinary incontinence  -Likely multifactorial, confusion could be explained by active infection, may also have early cognitive decline complicated by active alcohol use; urinary  incontinence could be secondary to prostate cancer and oxybutynin  -MRI brain without acute stroke, with diffuse parenchymal volume loss  -Neurology evaluated, recommended outpatient EMG/NCS.   - Follow-up with outpatient neurology in 1 month.        Alcohol use   -wife reports patient is drinking at least 4-6 beers per day, much more over the holidays  -No withdrawal symptoms.  - Discharged on Thiamin/folic acid.     Permanent atrial fibrillation  -No antiarrhythmics along his home regimen.Follows with cardiology.  -Continue Xarelto from home regimen.     Hypertension  -Continue home torsemide. Follows with Cardiology.     Obstructive sleep apnea  -He may use his home CPAP machine or can be provided a machine to use here by respiratory therapy.        Prostate cancer   -Under active surveillance as outpatient. Continue outpatient follow-up with urology.     Abnormal SPEP test: MGUS?  - Oncology consulted.  -  recommendations as mentioned below:  He does not have anemia, renal dysfunction, hypercalcemia, or elevated serum protein.  Imaging does not show any bone lesions or adenopathy.     Very low suspicion that this lab finding is of any clinical significance.  I will send immunofixation and serum free light chains.  He does not need to wait on these results for discharge.  If he is confirmed to have a monoclonal protein detectable in his blood, then he can be followed as an outpatient for MGUS.         Discharge Follow Up Recommendations for outpatient labs/diagnostics:  Follow up with pcp in 1 week.    Day of Discharge     HPI:   Seen and examined today.    Review of Systems  No fever, chills, CO or SOB.    Vital Signs:   Temp:  [97.6 °F (36.4 °C)-98.1 °F (36.7 °C)] 97.6 °F (36.4 °C)  Heart Rate:  [85-95] 90  Resp:  [18] 18  BP: (126-155)/(79-96) 126/79  Flow (L/min):  [2] 2      Physical Exam:  Constitutional: Awake, alert, resting comfortably  HENT: NCAT, mucous membranes moist  Respiratory: Crackles  present bilaterally throughout upper and lower lung fields, requiring oxygen  Cardiovascular: Mildly tachycardic, no murmurs, rubs, or gallops  Gastrointestinal: Positive bowel sounds, soft, nontender, nondistended  Musculoskeletal: No bilateral ankle edema  Neurologic: Alert and oriented x 3, no focal deficits, speech clear. 5/5 in muscle strength of bilateral upper and lower extremities. Did not evaluate gait.  Skin: No rashes       Pertinent  and/or Most Recent Results     LAB RESULTS:      Lab 01/07/24  0818 01/06/24  0529 01/05/24  0509 01/04/24  0559 01/03/24  0402 01/02/24 1920   WBC 7.74 8.26 9.55 9.47 11.00* 13.08*   HEMOGLOBIN 14.4 14.0 13.3 13.1 12.9* 15.1   HEMATOCRIT 41.3 40.6 38.5 38.1 37.9 43.5   PLATELETS 320 293 248 246 215 251   NEUTROS ABS  --   --   --   --  8.03* 10.77*   IMMATURE GRANS (ABS)  --   --   --   --  0.06* 0.06*   LYMPHS ABS  --   --   --   --  1.07 0.64*   MONOS ABS  --   --   --   --  1.78* 1.57*   EOS ABS  --   --   --   --  0.03 0.01   MCV 94.3 94.0 94.4 94.1 96.7 95.6   CRP  --   --   --   --   --  19.37*   PROCALCITONIN  --   --   --   --   --  0.31*   LACTATE  --   --   --   --   --  1.5         Lab 01/07/24  0818 01/06/24 1948 01/06/24  0529 01/05/24  0509 01/04/24  0559 01/03/24 0402 01/02/24 1920   SODIUM 138  --  135* 130* 130* 134* 133*   POTASSIUM 3.8 3.6 3.3* 3.7 3.8 4.2 4.0   CHLORIDE 99  --  95* 96* 96* 99 96*   CO2 33.0*  --  28.0 26.0 25.0 25.0 23.0   ANION GAP 6.0  --  12.0 8.0 9.0 10.0 14.0   BUN 12  --  13 14 14 17 15   CREATININE 0.77  --  0.74* 0.70* 0.78 0.89 0.92   EGFR 92.2  --  93.3 94.9 91.9 88.3 85.7   GLUCOSE 109*  --  109* 106* 115* 121* 119*   CALCIUM 8.7  --  8.2* 8.4* 8.2* 8.3* 8.8   MAGNESIUM 2.2  --  2.1  --   --  2.3 2.3   PHOSPHORUS  --   --   --   --   --   --  2.3*   HEMOGLOBIN A1C  --   --   --   --   --  4.80  --    TSH  --   --   --   --   --   --  0.977         Lab 01/03/24  1625 01/03/24  0402 01/02/24  1920   TOTAL PROTEIN  --  5.4*  7.2   ALBUMIN 3.0 3.4* 4.0   GLOBULIN  --  2.0 3.2   ALT (SGPT)  --  11 14   AST (SGOT)  --  26 27   BILIRUBIN  --  2.3* 2.4*   ALK PHOS  --  70 89   LIPASE  --   --  31         Lab 01/05/24  0509 01/02/24  1920   PROBNP 1,706.0  --    HSTROP T  --  22*             Lab 01/03/24  0402   FOLATE >20.00   VITAMIN B 12 1,000*         Lab 01/02/24  2134   PH, ARTERIAL 7.463*   PCO2, ARTERIAL 35.8   PO2 ART 70.8*   FIO2 21   HCO3 ART 25.6   BASE EXCESS ART 2.1*   CARBOXYHEMOGLOBIN 2.0     Brief Urine Lab Results  (Last result in the past 365 days)        Color   Clarity   Blood   Leuk Est   Nitrite   Protein   CREAT   Urine HCG        01/02/24 1922 Yellow   Clear   Small (1+)   Trace   Negative   30 mg/dL (1+)                 Microbiology Results (last 10 days)       Procedure Component Value - Date/Time    Respiratory Culture - Sputum, Cough [[id number]] Collected: 01/05/24 1115    Lab Status: Final result Specimen: Sputum from Cough Updated: 01/05/24 1341     Respiratory Culture Rejected     Gram Stain Rare (1+) Epithelial cells per low power field      No WBCs per low power field      No organisms seen    Narrative:      Specimen rejected due to oropharyngeal contamination. Please reorder and recollect specimen if clinically necessary.    Blood Culture - Blood, Arm, Right [[id number]]  (Normal) Collected: 01/04/24 2024    Lab Status: Preliminary result Specimen: Blood from Arm, Right Updated: 01/06/24 2246     Blood Culture No growth at 2 days    Blood Culture - Blood, Arm, Right [[id number]]  (Normal) Collected: 01/04/24 2024    Lab Status: Preliminary result Specimen: Blood from Arm, Right Updated: 01/06/24 2246     Blood Culture No growth at 2 days    Respiratory Culture - Sputum, Cough [[id number]] Collected: 01/03/24 1630    Lab Status: Final result Specimen: Sputum from Cough Updated: 01/03/24 2208     Respiratory Culture Rejected     Gram Stain Few (2+) WBCs per low power field      Rare (1+) Epithelial cells per low  power field      Rare (1+) Gram positive cocci    Narrative:      Specimen rejected due to oropharyngeal contamination. Please reorder and recollect specimen if clinically necessary.    MRSA Screen, PCR (Inpatient) - Swab, Nares [[id number]]  (Normal) Collected: 01/03/24 0943    Lab Status: Final result Specimen: Swab from Nares Updated: 01/03/24 1238     MRSA PCR Negative    Narrative:      The negative predictive value of this diagnostic test is high and should only be used to consider de-escalating anti-MRSA therapy. A positive result may indicate colonization with MRSA and must be correlated clinically.  MRSA Negative    Blood Culture - Blood, Arm, Right [[id number]]  (Normal) Collected: 01/02/24 2143    Lab Status: Preliminary result Specimen: Blood from Arm, Right Updated: 01/06/24 2231     Blood Culture No growth at 4 days    COVID PRE-OP / PRE-PROCEDURE SCREENING ORDER (NO ISOLATION) - Swab, Nasopharynx [[id number]]  (Abnormal) Collected: 01/02/24 1928    Lab Status: Final result Specimen: Swab from Nasopharynx Updated: 01/02/24 2054    Narrative:      The following orders were created for panel order COVID PRE-OP / PRE-PROCEDURE SCREENING ORDER (NO ISOLATION) - Swab, Nasopharynx.  Procedure                               Abnormality         Status                     ---------                               -----------         ------                     Respiratory Panel PCR w/...[[id number]]  Abnormal            Final result                 Please view results for these tests on the individual orders.    Respiratory Panel PCR w/COVID-19(SARS-CoV-2) [facility]/SCOOBY/PAD/COR/DARREN In-House, NP Swab in UTM/VTM, 2 HR TAT - Swab, Nasopharynx [[id number]]  (Abnormal) Collected: 01/02/24 1928    Lab Status: Final result Specimen: Swab from Nasopharynx Updated: 01/02/24 2054     ADENOVIRUS, PCR Not Detected     Coronavirus 229E Not Detected     Coronavirus HKU1 Not Detected     Coronavirus NL63 Not Detected     Coronavirus OC43  Not Detected     COVID19 Not Detected     Human Metapneumovirus Not Detected     Human Rhinovirus/Enterovirus Detected     Influenza A PCR Not Detected     Influenza B PCR Not Detected     Parainfluenza Virus 1 Not Detected     Parainfluenza Virus 2 Not Detected     Parainfluenza Virus 3 Not Detected     Parainfluenza Virus 4 Not Detected     RSV, PCR Not Detected     Bordetella pertussis pcr Not Detected     Bordetella parapertussis PCR Not Detected     Chlamydophila pneumoniae PCR Not Detected     Mycoplasma pneumo by PCR Not Detected    Narrative:      In the setting of a positive respiratory panel with a viral infection PLUS a negative procalcitonin without other underlying concern for bacterial infection, consider observing off antibiotics or discontinuation of antibiotics and continue supportive care. If the respiratory panel is positive for atypical bacterial infection (Bordetella pertussis, Chlamydophila pneumoniae, or Mycoplasma pneumoniae), consider antibiotic de-escalation to target atypical bacterial infection.    Urine Culture - Urine, Urine, Clean Catch [[id number]] Collected: 01/02/24 1922    Lab Status: Final result Specimen: Urine, Clean Catch Updated: 01/04/24 1042     Urine Culture <10,000 CFU/mL Normal Urogenital Kathy    Narrative:      Colonization of the urinary tract without infection is common. Treatment is discouraged unless the patient is symptomatic, pregnant, or undergoing an invasive urologic procedure.    Legionella Antigen, Urine - Urine, Urine, Clean Catch [[id number]]  (Normal) Collected: 01/02/24 1922    Lab Status: Final result Specimen: Urine, Clean Catch Updated: 01/03/24 0945     LEGIONELLA ANTIGEN, URINE Negative    S. Pneumo Ag Urine or CSF - Urine, Urine, Clean Catch [[id number]]  (Normal) Collected: 01/02/24 1922    Lab Status: Final result Specimen: Urine, Clean Catch Updated: 01/03/24 0945     Strep Pneumo Ag Negative    Blood Culture - Blood, Arm, Left [[id number]]   (Abnormal) Collected: 01/02/24 1920    Lab Status: Final result Specimen: Blood from Arm, Left Updated: 01/05/24 0557     Blood Culture Micrococcus species     Isolated from Aerobic Bottle     Gram Stain Aerobic Bottle Gram positive cocci in groups    Narrative:      Probable contaminant requires clinical correlation, susceptibility not performed unless requested by physician.    Blood Culture ID, PCR - Blood, Arm, Left [[id number]] Collected: 01/02/24 1920    Lab Status: Final result Specimen: Blood from Arm, Left Updated: 01/04/24 0138     BCID, PCR Negative by BCID PCR. Culture to Follow.     BOTTLE TYPE Aerobic Bottle            XR Chest 1 View    Result Date: 1/5/2024  XR CHEST 1 VW Date of Exam: 1/5/2024 1:18 AM EST Indication: sob Comparison: CT chest 1/3/2024 and chest radiograph 1/2/2024. Findings: There is increasing consolidated pneumonia in the right mid and lower lung. There is new left basilar airspace disease although evaluation is limited due to positioning and technique. There is no pneumothorax or definite pleural effusion. The heart is enlarged. Pulmonary vasculature is obscured on the right, normal on the left.     Impression: Increasing consolidated pneumonia in the right mid and lower lung and new left basilar airspace disease. Electronically Signed: Mayco Coelho MD  1/5/2024 1:39 AM EST  Workstation ID: [id number]    CT Angiogram Chest    Result Date: 1/3/2024  CT ANGIOGRAM CHEST Date of Exam: 1/3/2024 12:26 AM EST Indication: dyspnea, tachycardia, 2 weeks productive cough and infectious labs concerning.. Comparison: Chest radiograph 1/2/2024. Technique: CTA of the chest was performed after the uneventful intravenous administration of 85 mL of Isovue-370. Reconstructed coronal and sagittal images were also obtained. In addition, a 3-D volume rendered image was created for interpretation. Automated exposure control and iterative reconstruction methods were used. Findings: There is mild  multifocal linear scarring in the lungs. There is patchy consolidated pneumonia in the dependent portion of the right lower lobe. There is associated peribronchial thickening. There is mild subpleural scarring at multiple locations in both lungs. Central airways are patent. There is segmentally obstructive endobronchial debris in the right lower lobe. No pneumothorax. There is a small right-sided pleural effusion. The thyroid, trachea and esophagus appear within normal limits. The heart is mildly enlarged. There are severe coronary artery calcifications. There is mild aortic atherosclerosis. No aneurysm. There is no evidence of pulmonary embolism. No pericardial effusion. There is a reactive right hilar lymph node measuring up to 19 mm short axis. No acute findings in the superficial soft tissues. There is mild gynecomastia. There is a mildly calcified splenic artery aneurysm at the pancreatic tail measuring 15 mm diameter. There is a solitary gallstone without acute cholecystitis.     Impression: 1.No evidence of pulmonary embolism. 2.Right lower lobe pneumonia and small right-sided pleural effusion. 3.Mild cardiomegaly and severe coronary artery calcification. 4.Cholelithiasis without evidence of acute cholecystitis. 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 Electronically Signed: Mayco Coelho MD  1/3/2024 1:02 AM EST  Workstation ID: [id number]    MRI Brain Without Contrast    Result Date: 1/2/2024  MRI BRAIN WO CONTRAST Date of Exam: 1/2/2024 10:25 PM EST Indication: 3 weeks ataxia, encephalopathy.  Comparison: None available. Technique:  Routine multiplanar/multisequence sequence images of the brain were obtained without contrast administration. Findings: Severely motion degraded study. No acute infarct. 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 No definite intracranial hemorrhage, although evaluation is degraded by motion. No mass effect, edema or midline shift  Moderate periventricular and subcortical white matter T2/FLAIR hyperintensities, nonspecific but most likely represents chronic small vessel ischemic changes. No extra-axial fluid collection. Prominent ventricular system secondary to chronic parenchymal volume loss. Superimposed normal pressure hydrocephalus is not completely excluded. Status post bilateral lens extraction. Otherwise the orbits are grossly unremarkable Mucosal thickening of the left maxillary sinus. Otherwise clear The visualized soft tissues are unremarkable. No acute osseous abnormality.     Impression: No acute infarct or intracranial hemorrhage. Please note that the study is severely motion degraded which limits the evaluation for subtle findings. Moderate periventricular and subcortical T2/FLAIR hyperintensities, nonspecific but most likely represents chronic small vessel ischemic changes. Possible small old infarct in the right occipital lobe. Diffuse parenchymal volume loss. Superimposed normal pressure hydrocephalus is not excluded. Electronically Signed: Morro Koenig DO  1/2/2024 10:51 PM EST  Workstation ID: [id number]    XR Chest 1 View    Result Date: 1/2/2024  XR CHEST 1 VW Date of Exam: 1/2/2024 7:01 PM EST Indication: Weak/Dizzy/AMS triage protocol Comparison: 7/31/2021 Findings: Mild cardiomegaly is stable. There is linear scarring or atelectasis within the left upper lobe which is unchanged. Calcified granulomas are seen within the left lung base. There is no new airspace consult effusion or pneumothorax. Bony structures are unremarkable.     Impression: 1. Stable cardiomegaly. 2. No acute cardiopulmonary disease. Electronically Signed: Charles Pulido MD  1/2/2024 7:10 PM EST  Workstation ID: [id number]             Results for orders placed during the hospital encounter of 10/26/23    Adult Transthoracic Echo Complete w/ Color, Spectral and Contrast if necessary per protocol    Interpretation Summary    Left ventricular systolic  function is normal. Estimated left ventricular EF = 55%    Left ventricular diastolic function is consistent with (grade I) impaired relaxation.    Trace mitral regurgitation.    Trace to mild tricuspid regurgitation.    Calculated right ventricular systolic pressure from tricuspid regurgitation is 17 mmHg.      Plan for Follow-up of Pending Labs/Results:   Pending Labs       Order Current Status    SATHISH,PE and FLC, Serum In process    Blood Culture - Blood, Arm, Right Preliminary result    Blood Culture - Blood, Arm, Right Preliminary result    Blood Culture - Blood, Arm, Right Preliminary result          Discharge Details        Discharge Medications        New Medications        Instructions Start Date   acetaminophen 325 MG tablet  Commonly known as: TYLENOL   650 mg, Oral, Every 4 Hours PRN      amoxicillin-clavulanate 875-125 MG per tablet  Commonly known as: AUGMENTIN   1 tablet, Oral, 2 Times Daily      benzonatate 100 MG capsule  Commonly known as: TESSALON   100 mg, Oral, 3 Times Daily PRN      doxycycline 100 MG capsule  Commonly known as: VIBRAMYCIN   100 mg, Oral, 2 Times Daily      folic acid 1 MG tablet  Commonly known as: FOLVITE   1 mg, Oral, Daily   Start Date: January 8, 2024     guaiFENesin 600 MG 12 hr tablet  Commonly known as: MUCINEX   600 mg, Oral, Every 12 Hours Scheduled      thiamine 100 MG tablet  Commonly known as: VITAMIN B1   100 mg, Oral, Daily   Start Date: January 8, 2024            Continue These Medications        Instructions Start Date   NON FORMULARY   1 capsule, Oral, 2 Times Daily, VITAMINS A,C,E-ZINC-COPPER (ICAPS AREDS) [phone] UNIT-MG-UNIT CAP      oxybutynin 5 MG tablet  Commonly known as: DITROPAN   10 mg, Oral, Daily      sertraline 50 MG tablet  Commonly known as: ZOLOFT   50 mg, Oral, Daily      torsemide 20 MG tablet  Commonly known as: DEMADEX   TAKE ONE (1) TABLET BY MOUTH DAILY.      Xarelto 20 MG tablet  Generic drug: rivaroxaban   TAKE ONE (1) TABLET BY  MOUTH DAILY.      ZyrTEC Allergy 10 MG capsule  Generic drug: Cetirizine HCl   10 mg, Oral, Daily               Allergies   Allergen Reactions    Naproxen      Patient states he has made changes on that         Discharge Disposition:  Home or Self Care    Diet:  Hospital:  Diet Order   Procedures    Diet: Cardiac Diets, Regular/House Diet; Healthy Heart (2-3 Na+); Texture: Regular Texture (IDDSI 7); Fluid Consistency: Thin (IDDSI 0)            Activity: As tolerated.           CODE STATUS:    Code Status and Medical Interventions:   Ordered at: 01/03/24 0042     Level Of Support Discussed With:    Patient     Code Status (Patient has no pulse and is not breathing):    CPR (Attempt to Resuscitate)     Medical Interventions (Patient has pulse or is breathing):    Full Support       Future Appointments   Date Time Provider Department Center   5/29/2024 10:30 AM Namrata Kaur MD [facility]   10/22/2024  8:15 AM Mariano Melendez APRN [facility]                 Simón Valencia MD  01/07/24      Time Spent on Discharge:  I spent  35  minutes on this discharge activity which included: face-to-face encounter with the patient, reviewing the data in the system, coordination of the care with the nursing staff as well as consultants, documentation, and entering orders.

## 2024-01-07 NOTE — PLAN OF CARE
Goal Outcome Evaluation:           Progress: improving  Outcome Evaluation: 500 ft gait ind W transfers OOB in chair      Anticipated Discharge Disposition (PT): home with assist

## 2024-01-07 NOTE — THERAPY TREATMENT NOTE
Patient Name: Favian Arellano  : 1945    MRN: [id number]                              Today's Date: 2024       Admit Date: 2024    Visit Dx:     ICD-10-CM ICD-9-CM   1. Normal pressure hydrocephalus  G91.2 331.5   2. Pneumonia of right lower lobe due to infectious organism  J18.9 486   3. Confusion  R41.0 298.9   4. Permanent atrial fibrillation  I48.21 427.31   5. Mild obesity  E66.9 278.00   6. Essential hypertension  I10 401.9   7. Neuropathy  G62.9 355.9   8. Other disorders of peripheral nervous system  G64 357.89   9. Dysphagia, unspecified type  R13.10 787.20     Patient Active Problem List   Diagnosis    Permanent atrial fibrillation    Tachy-krishna syndrome    Essential hypertension    Lower extremity edema    PARKER (obstructive sleep apnea)    Osteoarthritis    Mild obesity    MG, ocular (myasthenia gravis)    Pain    Hematoma of right thigh    S/P Debridement irrigation and partial closure of cutaneous tissue right knee    Pneumonia    A-fib    Hyperbilirubinemia    Ataxia     Past Medical History:   Diagnosis Date    Benign hypertension     History of eye surgery     Lower extremity edema     Mild obesity     PARKER (obstructive sleep apnea)     requiring CPAP    Osteoarthritis     Paroxysmal atrial fibrillation     Tachy-krishna syndrome      Past Surgical History:   Procedure Laterality Date    CARDIOVERSION      HERNIA REPAIR      INCISION AND DRAINAGE LEG Right 2017    Procedure: INCISION AND DRAINAGE RIGHT KNEE;  Surgeon: Francisco Macias MD;  Location: [facility];  Service:     JOINT REPLACEMENT      NEPHROLITHOTRIPSY PERCUTANEOUS        General Information       Row Name 24 1059          Physical Therapy Time and Intention    Document Type therapy note (daily note)  -CT     Mode of Treatment physical therapy  -CT       Row Name 24 3473          General Information    Patient Profile Reviewed yes  -CT     Prior Level of Function independent:;all household  mobility;gait;transfer;bed mobility;ADL's;home management;grooming;feeding;dressing  -CT     Barriers to Rehab hearing deficit  -CT       Row Name 01/07/24 1059          Living Environment    People in Home spouse  -CT       Row Name 01/07/24 1059          Home Main Entrance    Number of Stairs, Main Entrance six  -CT     Stair Railings, Main Entrance railing on left side (ascending)  -CT       Row Name 01/07/24 1059          Cognition    Orientation Status (Cognition) oriented x 4  -CT       Row Name 01/07/24 1059          Safety Issues, Functional Mobility    Safety Issues Affecting Function (Mobility) insight into deficits/self-awareness  -CT     Impairments Affecting Function (Mobility) endurance/activity tolerance  -CT               User Key  (r) = Recorded By, (t) = Taken By, (c) = Cosigned By      Initials Name Provider Type    CT Jose Deras, PT Physical Therapist                   Mobility       Row Name 01/07/24 1101          Bed Mobility    Bed Mobility supine-sit;sit-supine;bed mobility (all) activities  -CT     All Activities, [place] (Bed Mobility) independent  -CT     Supine-Sit [place] (Bed Mobility) independent  -CT     Sit-Supine [place] (Bed Mobility) independent  -CT       Row Name 01/07/24 1101          Bed-Chair Transfer    Bed-Chair [place] (Transfers) independent  -CT     Assistive Device (Bed-Chair Transfers) walker, front-wheeled  -CT       Row Name 01/07/24 1101          Sit-Stand Transfer    Sit-Stand [place] (Transfers) independent  -CT     Assistive Device (Sit-Stand Transfers) walker, front-wheeled  -CT       Row Name 01/07/24 1101          Gait/Stairs (Locomotion)    [place] Level (Gait) supervision;1 person to manage equipment  -CT     Assistive Device (Gait) walker, front-wheeled  -CT     Distance in Feet (Gait) 500 ft  -CT               User Key  (r) = Recorded By, (t) = Taken By, (c) = Cosigned By      Initials Name Provider Type    CT  Jose Deras, DENISE Physical Therapist                   Obj/Interventions       Row Name 01/07/24 1102          Motor Skills    Motor Skills functional endurance  -CT       Row Name 01/07/24 1102          Balance    Balance Assessment sitting static balance;sitting dynamic balance;standing static balance;standing dynamic balance  -CT     Static Sitting Balance independent  -CT     Dynamic Sitting Balance independent  -CT     Static Standing Balance supervision  -CT     Dynamic Standing Balance supervision  -CT     Position/Device Used, Standing Balance supported  -CT     Balance Interventions sitting;standing;supported;dynamic;weight shifting activity  -CT               User Key  (r) = Recorded By, (t) = Taken By, (c) = Cosigned By      Initials Name Provider Type    CT Jose Deras, DENISE Physical Therapist                   Goals/Plan    No documentation.                  Clinical Impression       Row Name 01/07/24 1103          Pain    Pretreatment Pain Rating 3/10  -CT     Posttreatment Pain Rating 3/10  -CT     Pain Location - abdomen  -CT     Pain Intervention(s) Medication (See MAR);Ambulation/increased activity;Nursing Notified  -CT       Row Name 01/07/24 1103          Plan of Care Review    Progress improving  -CT     Outcome Evaluation 500 ft gait ind W transfers OOB in chair  -CT       Row Name 01/07/24 1103          Therapy Assessment/Plan (PT)    Criteria for Skilled Interventions Met (PT) yes;skilled treatment is necessary  -CT     Therapy Frequency (PT) daily  -CT       Row Name 01/07/24 1103          Positioning and Restraints    Pre-Treatment Position in bed  -CT     Post Treatment Position chair  -CT     In Chair notified nsg;reclined;sitting;call light within reach;exit alarm on;with family/caregiver  -CT               User Key  (r) = Recorded By, (t) = Taken By, (c) = Cosigned By      Initials Name Provider Type    CT Jose Deras, DENISE Physical Therapist                    Outcome Measures       Row Name 01/07/24 1104          How much help from another person do you currently need...    Turning from your back to your side while in flat bed without using bedrails? 4  -CT     Moving from lying on back to sitting on the side of a flat bed without bedrails? 4  -CT     Moving to and from a bed to a chair (including a wheelchair)? 4  -CT     Standing up from a chair using your arms (e.g., wheelchair, bedside chair)? 4  -CT     Climbing 3-5 steps with a railing? 3  -CT     To walk in hospital room? 4  -CT     AM-PAC 6 Clicks Score (PT) 23  -CT     Highest Level of Mobility Goal 7 --> Walk 25 feet or more  -CT       Row Name 01/07/24 1104          Functional Assessment    Outcome Measure Options AM-PAC 6 Clicks Basic Mobility (PT)  -CT               User Key  (r) = Recorded By, (t) = Taken By, (c) = Cosigned By      Initials Name Provider Type    CT Jose Deras, PT Physical Therapist                                 Physical Therapy Education       Title: PT OT SLP Therapies (In Progress)       Topic: Physical Therapy (Done)       Point: Mobility training (Done)       Learning Progress Summary             Patient Eager, E,D, DU,VU by CT at 1/7/2024 1104    Acceptance, E, VU by KR at 1/3/2024 1141   Family Eager, E,D, DU,VU by CT at 1/7/2024 1104    Acceptance, E, VU by KR at 1/3/2024 1141                         Point: Home exercise program (Done)       Learning Progress Summary             Patient Eager, E,D, DU,VU by CT at 1/7/2024 1104   Family Eager, E,D, DU,VU by CT at 1/7/2024 1104                         Point: Body mechanics (Done)       Learning Progress Summary             Patient Eager, E,D, DU,VU by CT at 1/7/2024 1104    Acceptance, E, VU by KR at 1/3/2024 1141   Family Eager, E,D, DU,VU by CT at 1/7/2024 1104    Acceptance, E, VU by KR at 1/3/2024 1141                         Point: Precautions (Done)       Learning Progress Summary             Patient Eager,  E,D, DU,VU by CT at 1/7/2024 1104    Acceptance, E, VU by KR at 1/3/2024 1141   Family Eager, E,D, DU,VU by CT at 1/7/2024 1104    Acceptance, E, VU by KR at 1/3/2024 1141                                         User Key       Initials Effective Dates Name Provider Type Discipline    CT 07/07/23 -  Jose Deras, PT Physical Therapist PT    KR 12/30/22 -  Cynthia Barnes, DENISE Physical Therapist PT                  PT Recommendation and Plan     Progress: improving  Outcome Evaluation: 500 ft gait ind W transfers OOB in chair     Time Calculation:         PT Charges       Row Name 01/07/24 1105             Time Calculation    Start Time 1020  -CT      PT Received On 01/07/24  -CT         Time Calculation- PT    Total Timed Code Minutes- PT 40 minute(s)  -CT                User Key  (r) = Recorded By, (t) = Taken By, (c) = Cosigned By      Initials Name Provider Type    CT Jose Deras, PT Physical Therapist                  Therapy Charges for Today       Code Description Service Date Service Provider Modifiers Qty    [id number] HC GAIT TRAINING EA 15 MIN 1/7/2024 Jose Deras, PT GP 3            PT G-Codes  Outcome Measure Options: AM-PAC 6 Clicks Basic Mobility (PT)  AM-PAC 6 Clicks Score (PT): 23  AM-PAC 6 Clicks Score (OT): 18  PT Discharge Summary  Anticipated Discharge Disposition (PT): home with assist    Jose Deras, PT  1/7/2024

## 2024-01-08 ENCOUNTER — READMISSION MANAGEMENT (OUTPATIENT)
Dept: CALL CENTER | Facility: HOSPITAL | Age: 79
End: 2024-01-08
Payer: MEDICARE

## 2024-01-08 LAB
ALBUMIN SERPL ELPH-MCNC: 2.9 G/DL (ref 2.9–4.4)
ALBUMIN/GLOB SERPL: 1.1 {RATIO} (ref 0.7–1.7)
ALPHA1 GLOB SERPL ELPH-MCNC: 0.5 G/DL (ref 0–0.4)
ALPHA2 GLOB SERPL ELPH-MCNC: 0.9 G/DL (ref 0.4–1)
B-GLOBULIN SERPL ELPH-MCNC: 0.7 G/DL (ref 0.7–1.3)
GAMMA GLOB SERPL ELPH-MCNC: 1 G/DL (ref 0.4–1.8)
GLOBULIN SER-MCNC: 2.9 G/DL (ref 2.2–3.9)
IGA SERPL-MCNC: 307 MG/DL (ref 61–437)
IGG SERPL-MCNC: 1032 MG/DL (ref 603–1613)
IGM SERPL-MCNC: 59 MG/DL (ref 15–143)
INTERPRETATION SERPL IEP-IMP: ABNORMAL
KAPPA LC FREE SER-MCNC: 30 MG/L (ref 3.3–19.4)
KAPPA LC FREE/LAMBDA FREE SER: 0.99 {RATIO} (ref 0.26–1.65)
LABORATORY COMMENT REPORT: ABNORMAL
LAMBDA LC FREE SERPL-MCNC: 30.2 MG/L (ref 5.7–26.3)
M PROTEIN SERPL ELPH-MCNC: ABNORMAL G/DL
PROT SERPL-MCNC: 5.8 G/DL (ref 6–8.5)

## 2024-01-08 NOTE — OUTREACH NOTE
Prep Survey      Flowsheet Row Responses   [facility] facility patient discharged from? [facility]   Is LACE score < 7 ? No   Eligibility Readm Mgmt   Discharge diagnosis RLL pneumonia   Does the patient have one of the following disease processes/diagnoses(primary or secondary)? Pneumonia   Does the patient have Home health ordered? No   Is there a DME ordered? No   Prep survey completed? Yes            Jesica Carter Registered Nurse

## 2024-01-09 LAB
BACTERIA SPEC AEROBE CULT: NORMAL
BACTERIA SPEC AEROBE CULT: NORMAL

## 2024-01-11 ENCOUNTER — READMISSION MANAGEMENT (OUTPATIENT)
Dept: CALL CENTER | Facility: HOSPITAL | Age: 79
End: 2024-01-11
Payer: MEDICARE

## 2024-01-11 LAB
QT INTERVAL: 322 MS
QTC INTERVAL: 452 MS

## 2024-01-11 NOTE — OUTREACH NOTE
COPD/PN Week 1 Survey      Flowsheet Row Responses   [facility] patient discharged from? [place]   Does the patient have one of the following disease processes/diagnoses(primary or secondary)? Pneumonia   Week 1 attempt successful? Yes   Call start time 1334   Call end time 1337   Discharge diagnosis RLL pneumonia   Meds reviewed with patient/caregiver? Yes   Is the patient having any side effects they believe may be caused by any medication additions or changes? No   Does the patient have all medications ordered at discharge? Yes   Is the patient taking all medications as directed (includes completed medication regime)? Yes   Does the patient have a primary care provider?  Yes   Does the patient have an appointment with their PCP or specialist within 7 days of discharge? Yes   Has the patient kept scheduled appointments due by today? Yes   Pulse Ox monitoring Intermittent   Pulse Ox device source Patient   O2 Sat comments 92-96   Is the patient/caregiver able to teach back signs and symptoms of worsening condition: Fever/chills   Is the patient/caregiver able to teach back importance of completing antibiotic course of treatment? No   Week 1 call completed? Yes   Graduated Yes   Is the patient interested in additional calls from an ambulatory ? No   Wrap up additional comments doing great, no questions not using inhalers   Call end time 1337            KEENA BEAN - Registered Nurse

## 2024-01-18 NOTE — PROGRESS NOTES
"Enter Query Response Below      Query Response: Viral pneumonia              If applicable, please update the problem list.     Patient: Favian Arellano \"Vlad\"        : 1945  Account: [id number]           Admit Date: 2024        How to Respond to this query:       a. Click New Note     b. Answer query within the yellow box.                c. Update the Problem List, if applicable.      If you have any questions about this query contact me at: [phone]     Dr. Valencia:    78-year-old man with history of prostate cancer, atrial fibrillation, sleep apnea presented with cough and dyspnea and confusion.  Labs include WBC 13.08, procalcitonin 0.31, CRP 19.37, lactate 1.5.  Respiratory panel showed human rhinovirus/enterovirus detected.  ED documentation states  \"viral panel is only positive for human rhinovirus continuing to treat as bacterial pneumonia.\"  Neurology consult note states rhinovirus pneumonia.  Legionella and strep pneumo urine antigens negative. MRSA probe negative.  Patient given zosyn IV 12-1/3, -, rocephin IV 1/3-, doxycycline IV -.   Discharge summary includes RLL pneumonia and rhinovirus/enterovirus infection.      Please clarify the type of pneumonia the patient was treated/monitored for:    Viral pneumonia   Bacterial pneumonia  Bacterial pneumonia superimposed on viral pneumonia  Other- specify______  Unable to determine      By submitting this query, we are merely seeking further clarification of documentation to accurately reflect all conditions that you are monitoring, evaluating, treating or that extend the hospitalization or utilize additional resources of care. Please utilize your independent clinical judgment when addressing the question(s) above.     This query and your response, once completed, will be entered into the legal medical record.    Sincerely,  Skyla Landaverde RN, MSN  Clinical Documentation Integrity Program   [email or web address] "

## 2024-01-18 NOTE — PROGRESS NOTES
"Enter Query Response Below      Query Response: acute hypoxic respiratory failure 2/2 Rhinovirus PNA             If applicable, please update the problem list.     Patient: Favian Arellano \"Vlad\"        : 1945  Account: [id number]           Admit Date: 2024        How to Respond to this query:       a. Click New Note     b. Answer query within the yellow box.                c. Update the Problem List, if applicable.      If you have any questions about this query contact me at: [phone]     Dr. Valencia:     78-year-old man with history of prostate cancer, atrial fibrillation, sleep apnea presented 1/ with cough and dyspnea and found to have rhinovirus/enterovirus with right lower lobe pneumonia.  02 sat in ED 94% on room air with normal respiratory effort noted.  ABG results pH 7.463, pC02 35.8, p02 70.8, room air.  02 sat did decrease on  to 90% and patient was placed on 3L per nasal cannula.  No work of breathing documented.  Patient was weaned to room air during hospital stay.  H&P states transient hypoxia.  Discharge summary included acute hypoxic respiratory failure.    After study, was acute respiratory failure clinically supported during this admission?    Acute respiratory failure was supported with additional clinical indicators:____________  Acute respiratory failure was not supported  Other- specify_____________  Unable to determine    By submitting this query, we are merely seeking further clarification of documentation to accurately reflect all conditions that you are monitoring, evaluating, treating or that extend the hospitalization or utilize additional resources of care. Please utilize your independent clinical judgment when addressing the question(s) above.     This query and your response, once completed, will be entered into the legal medical record.    Sincerely,  Skyla Landaverde RN, MSN  Clinical Documentation Integrity Program   [email or web address]     "

## 2024-03-19 RX ORDER — TORSEMIDE 20 MG/1
TABLET ORAL
Qty: 90 TABLET | Refills: 3 | Status: SHIPPED | OUTPATIENT
Start: 2024-03-19

## 2024-03-19 NOTE — TELEPHONE ENCOUNTER
Lab Results   Component Value Date    GLUCOSE 109 (H) 01/07/2024    CALCIUM 8.7 01/07/2024     01/07/2024    K 3.8 01/07/2024    CO2 33.0 (H) 01/07/2024    CL 99 01/07/2024    BUN 12 01/07/2024    CREATININE 0.77 01/07/2024    EGFR 92.2 01/07/2024    BCR 15.6 01/07/2024    ANIONGAP 6.0 01/07/2024

## 2024-05-29 ENCOUNTER — OFFICE VISIT (OUTPATIENT)
Dept: CARDIOLOGY | Facility: CLINIC | Age: 79
End: 2024-05-29
Payer: MEDICARE

## 2024-05-29 VITALS
HEART RATE: 78 BPM | DIASTOLIC BLOOD PRESSURE: 63 MMHG | SYSTOLIC BLOOD PRESSURE: 132 MMHG | OXYGEN SATURATION: 96 % | BODY MASS INDEX: 34.44 KG/M2 | HEIGHT: 75 IN | WEIGHT: 277 LBS

## 2024-05-29 DIAGNOSIS — I48.21 PERMANENT ATRIAL FIBRILLATION: Primary | ICD-10-CM

## 2024-05-29 DIAGNOSIS — I10 ESSENTIAL HYPERTENSION: ICD-10-CM

## 2024-05-29 DIAGNOSIS — I49.5 TACHY-BRADY SYNDROME: ICD-10-CM

## 2024-05-29 PROCEDURE — 3078F DIAST BP <80 MM HG: CPT | Performed by: PHYSICIAN ASSISTANT

## 2024-05-29 PROCEDURE — 3075F SYST BP GE 130 - 139MM HG: CPT | Performed by: PHYSICIAN ASSISTANT

## 2024-05-29 PROCEDURE — 99214 OFFICE O/P EST MOD 30 MIN: CPT | Performed by: PHYSICIAN ASSISTANT

## 2024-05-29 RX ORDER — TORSEMIDE 20 MG/1
20 TABLET ORAL DAILY
Qty: 90 TABLET | Refills: 3 | Status: SHIPPED | OUTPATIENT
Start: 2024-05-29

## 2024-05-29 NOTE — PROGRESS NOTES
[facility] Cardiology    Patient ID: Favian Arellano is a 79 y.o. male.  : 1945   Contact: [phone]    Encounter date: 2024    PCP: Saleem Yang MD      Chief complaint:   Chief Complaint   Patient presents with    Permanent atrial fibrillation       Problem List:  Chronic atrial fibrillation  Diagnosed 4 years ago with external cardioversion x3.  CHADS-Vasc = 2 (HTN, Age).  On Xarelto.  Failed multaq and Sotalol  Asymptomatic  Opted for rate-control but has not required AV nodals   Echo, 10/26/2023: EF 55%. Trace MR. Trace to mild TR with RVSP 17 mmHg.   Tachy-krishna syndrome.   Hypertension.   Lower extremity edema.   PARKER requiring CPAP.   Osteoarthritis.   Mild obesity.   Family history of hypertension.   Right knee subcutaneous hematoma/seroma with skin necrosis. S/P debridement and partial closure. Following with IV antibiotics, wound care.  Surgical history:   External cardioversion x3.  Lithotripsy.    Allergies   Allergen Reactions    Naproxen      Patient states he has made changes on that       Current Medications:    Current Outpatient Medications:     Cetirizine HCl (ZyrTEC Allergy) 10 MG capsule, Take 10 mg by mouth Daily., Disp: , Rfl:     NON FORMULARY, Take 1 capsule by mouth 2 (Two) Times a Day. VITAMINS A,C,E-ZINC-COPPER (ICAPS AREDS) [phone] UNIT-MG-UNIT CAP, Disp: , Rfl:     oxybutynin (DITROPAN) 5 MG tablet, Take 2 tablets by mouth Daily., Disp: , Rfl:     rivaroxaban (Xarelto) 20 MG tablet, TAKE ONE (1) TABLET BY MOUTH DAILY., Disp: 90 tablet, Rfl: 3    sertraline (ZOLOFT) 50 MG tablet, Take 1 tablet by mouth Daily., Disp: , Rfl:     torsemide (DEMADEX) 20 MG tablet, TAKE ONE (1) TABLET BY MOUTH DAILY., Disp: 90 tablet, Rfl: 3    benzonatate (TESSALON) 100 MG capsule, Take 1 capsule by mouth 3 (Three) Times a Day As Needed for Cough for up to 15 doses., Disp: 15 capsule, Rfl: 0    folic acid (FOLVITE) 1 MG tablet, Take 1 tablet by  "mouth Daily., Disp: 30 tablet, Rfl: 0    HPI    Favian Arellano is a 79 y.o. male who presents today for a follow up of permanent atrial fibrillation, tachy-krishna syndrome, and cardiac risk factors. Since last visit, patient was admitted to the hospital in January with pneumonia.  Patient has recovered from this very well.  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  He has some fluctuation in lower extremity swelling that seems to coincide when his legs are hanging.  He has not had any increased shortness of breath.  Wife in the room does state that she has a hard time convincing him to stay hydrated.  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  Patient states he was never symptomatic with his atrial fibrillation and this continues.      The following portions of the patient's history were reviewed and updated as appropriate: allergies, current medications and problem list.    Pertinent positives as listed in the HPI.  All other systems reviewed are negative.         Vitals:    05/29/24 1027   BP: 132/63   Pulse: 78   SpO2: 96%   Weight: 126 kg (277 lb)   Height: 190.5 cm (75\")       Physical Exam:  General: Alert and oriented.  Neck: Jugular venous pressure is within normal limits. Carotids have normal upstrokes without bruits.   Cardiovascular: Heart has a nondisplaced focal PMI. Regular rate and irregularly irregular rhythm. No murmur, gallop or rub.  Lungs: Faint crackles in bases otherwise clear.  Extremities: Trace bilateral lower extremity edema  Skin: Warm and dry.  Neurologic: Nonfocal.     Diagnostic Data (reviewed with patient):  Lab Results   Component Value Date    GLUCOSE 109 (H) 01/07/2024    BUN 12 01/07/2024    CREATININE 0.77 01/07/2024    BCR 15.6 01/07/2024     01/07/2024    K 3.8 01/07/2024    CL 99 01/07/2024    CO2 33.0 (H) 01/07/2024    CALCIUM 8.7 01/07/2024    ALBUMIN 2.9 " 01/06/2024    ALKPHOS 70 01/03/2024    AST 26 01/03/2024    ALT 11 01/03/2024     Lab Results   Component Value Date    WBC 7.74 01/07/2024    RBC 4.38 01/07/2024    HGB 14.4 01/07/2024    HCT 41.3 01/07/2024    MCV 94.3 01/07/2024     01/07/2024      Lab Results   Component Value Date    TSH 0.977 01/02/2024        Advance Care Planning   ACP discussion was declined by the patient. Patient does not have an advance directive, declines further assistance.           Assessment:    ICD-10-CM ICD-9-CM   1. Permanent atrial fibrillation  I48.21 427.31   2. Tachy-krishna syndrome  I49.5 427.81   3. Essential hypertension  I10 401.9         Plan:  Stable cardiac status at this time with heart rate in the 70s.  Blood pressure currently controlled off of medication at this time.  Encouraged patient to continue to observe this.  Continue on Xarelto 20 mg daily for stroke prophylaxis.   Continue torsemide 20 mg daily for fluid retention and discussed with the patient if he is noticing worsening swelling to his legs to contact us.  Continue all other current medications.  F/up in 12 months, sooner if needed.    Marcela Soto PA-C

## 2024-10-17 NOTE — PROGRESS NOTES
Sleep Clinic Follow Up Note    Chief Complaint  Sleeping Problem, Sleep Apnea, and Follow-up    Subjective     History of Present Illness (from previous encounter on 10/24/2023):  Favian Arellano is a 78 y.o. male who returns for follow-up and compliance of PAP therapy.  The Pap report has been reviewed.  Overall usage and compliance are excellent at 100%.  Patient averages 9 hours 51 minutes.  Sleep apnea is well controlled with an AHI of 2.3/H. I will refill the patient's supplies, and I have asked them to return for follow-up and compliance in 1 year or sooner should they have further questions or concerns.  With regard to episodes of sleep behaviors patient notes this occurs a few times every few weeks.  It does wake his wife up.  I have discussed the neurological risks including Parkinson's disease with sleep behavior/parasomnia.  I have asked him to try high-dose extended release melatonin. (End copied text).    Interval History:  Favian Arellano is a 79 y.o. male returns for follow up and compliance of PAP therapy. The patient was last seen on 10/24/2023 by me. Overall the patient feels good with regard to therapy. The device appears to be working appropriately. On average the patient sleeps 10-12 hours per night. The patient wakes 0-1 times per night. He has tried melatonin but not too long of a duration. He reports that it ismore of a jerk as oppsed to a behavior.     The patient reports the following changes to their medical and medication history since they were last seen:  None    Further details are as follows:      Crawford Scale is (out of 24): Total score: 2     Weight:  Current Weight: 283 lb    Weight change in the last year:  gain: 5 lbs    The patient's relevant past medical, surgical, family, and social history reviewed and updated in Epic as appropriate.    PMH:    Past Medical History:   Diagnosis Date    Benign hypertension     History of eye surgery     Lower extremity edema     Mild  "obesity     PARKER (obstructive sleep apnea)     requiring CPAP    Osteoarthritis     Paroxysmal atrial fibrillation     Tachy-krishna syndrome      Past Surgical History:   Procedure Laterality Date    CARDIOVERSION      HERNIA REPAIR      INCISION AND DRAINAGE LEG Right 8/18/2017    Procedure: INCISION AND DRAINAGE RIGHT KNEE;  Surgeon: Francisco Macias MD;  Location: [facility];  Service:     JOINT REPLACEMENT      NEPHROLITHOTRIPSY PERCUTANEOUS         Allergies   Allergen Reactions    Naproxen      Patient states he has made changes on that       MEDS:  Prior to Admission medications    Medication Sig Start Date End Date Taking? Authorizing Provider   Cetirizine HCl (ZyrTEC Allergy) 10 MG capsule Take 10 mg by mouth Daily. 5/17/23   Fatoumata Boateng MD   NON FORMULARY Take 1 capsule by mouth 2 (Two) Times a Day. VITAMINS A,C,E-ZINC-COPPER (ICAPS AREDS) 14,320-226-200 UNIT-MG-UNIT CAP    Fatoumata Boateng MD   oxybutynin (DITROPAN) 5 MG tablet Take 2 tablets by mouth Daily. 5/17/23   Fatoumata Boateng MD   rivaroxaban (Xarelto) 20 MG tablet Take 1 tablet by mouth Daily. 5/29/24   Marcela Soto PA-C   sertraline (ZOLOFT) 50 MG tablet Take 1 tablet by mouth Daily.    Fatoumata Boateng MD   torsemide (DEMADEX) 20 MG tablet Take 1 tablet by mouth Daily. 5/29/24   Marcela Soto PA-C         FH:  Family History   Problem Relation Age of Onset    Hypertension Other     Cancer Sister     Alzheimer's disease Mother        Objective   Vital Signs:  /68 (BP Location: Right arm, Patient Position: Sitting, Cuff Size: Adult)   Pulse 89   Temp 97.5 °F (36.4 °C) (Temporal)   Ht 190.5 cm (75\")   Wt 128 kg (283 lb 3.2 oz)   SpO2 95%   BMI 35.40 kg/m²     Patient's (Body mass index is 35.4 kg/m².) indicates that they are obese (BMI >30) with health related conditions that include obstructive sleep apnea . Weight is unchanged. BMI is is above average; BMI management plan is completed. We discussed " portion control and increasing exercise.            Physical Exam  Vitals reviewed.   Constitutional:       Appearance: Normal appearance.   HENT:      Head: Normocephalic and atraumatic.      Nose: Nose normal.      Mouth/Throat:      Mouth: Mucous membranes are moist.   Cardiovascular:      Rate and Rhythm: Normal rate and regular rhythm.      Heart sounds: No murmur heard.     No friction rub. No gallop.   Pulmonary:      Effort: Pulmonary effort is normal. No respiratory distress.      Breath sounds: Normal breath sounds. No wheezing or rhonchi.   Neurological:      Mental Status: He is alert and oriented to person, place, and time.   Psychiatric:         Behavior: Behavior normal.               Result Review :           PAP Report:  AHI: 2.2/h  Days of Usage: 90/90 (100%)  Number of Days Greater than 4 hours: 89/90 (99%)  Average time (days used): 10 hours 20 minutes  95th Percentile Pressure: 11.6 cmH2O  95th percentile leaks: 20.6 L/min  Settings: Auto CPAP-4/20 cm H2O, EPR full-time, EPR level 3, response standard.       Assessment and Plan  Favian Arellano is a 79 y.o. male who returns for follow-up and compliance of PAP therapy.  The Pap report has been reviewed.  Overall usage is at 100% with compliance at 99%.  The patient averages 10 hours and 20 minutes of therapy.  Sleep apnea is well-controlled with an AHI of 2.2/H. I will refill the patient's supplies, and I have asked them to return for follow-up and compliance in 1 year or sooner should they have further questions or concerns. He has had episodes of jerking and elbowing his wife. This does not appear to be related to a sleep behavior. He did not try melatonin for a long time.     Diagnoses and all orders for this visit:    1. PARKER (obstructive sleep apnea) (Primary)  -     PAP Therapy    2. Obesity (BMI 30.0-34.9)         The patient continues to use and benefit from PAP therapy.    1. The patient was counseled regarding multimodal approach with  healthy nutrition, healthy sleep, regular physical activity, social activities, counseling, and medications. Encouraged to practice lateral sleep position. Avoid alcohol and sedatives close to bedtime.     2.  We will refill supplies x1 year.  Return to clinic 1 year or sooner if symptoms warrant. I have reviewed the results of my evaluation and impression and discussed my recommendations in detail with the patient.           Follow Up  Return in about 1 year (around 10/22/2025) for Annual visit.  Patient was given instructions and counseling regarding his condition or for health maintenance advice. Please see specific information pulled into the AVS if appropriate.       LUCAS Bolaños, ACNP-BC  Pulmonology, Critical Care, and Sleep Medicine

## 2024-10-22 ENCOUNTER — OFFICE VISIT (OUTPATIENT)
Dept: SLEEP MEDICINE | Age: 79
End: 2024-10-22
Payer: MEDICARE

## 2024-10-22 VITALS
WEIGHT: 283.2 LBS | HEIGHT: 75 IN | SYSTOLIC BLOOD PRESSURE: 140 MMHG | OXYGEN SATURATION: 95 % | BODY MASS INDEX: 35.21 KG/M2 | DIASTOLIC BLOOD PRESSURE: 68 MMHG | HEART RATE: 89 BPM | TEMPERATURE: 97.5 F

## 2024-10-22 DIAGNOSIS — E66.811 OBESITY (BMI 30.0-34.9): ICD-10-CM

## 2024-10-22 DIAGNOSIS — G47.33 OSA (OBSTRUCTIVE SLEEP APNEA): Primary | ICD-10-CM

## 2024-10-22 PROCEDURE — 99213 OFFICE O/P EST LOW 20 MIN: CPT | Performed by: NURSE PRACTITIONER

## 2024-10-22 PROCEDURE — 1160F RVW MEDS BY RX/DR IN RCRD: CPT | Performed by: NURSE PRACTITIONER

## 2024-10-22 PROCEDURE — 3078F DIAST BP <80 MM HG: CPT | Performed by: NURSE PRACTITIONER

## 2024-10-22 PROCEDURE — 1159F MED LIST DOCD IN RCRD: CPT | Performed by: NURSE PRACTITIONER

## 2024-10-22 PROCEDURE — 3077F SYST BP >= 140 MM HG: CPT | Performed by: NURSE PRACTITIONER

## 2024-11-06 ENCOUNTER — TELEPHONE (OUTPATIENT)
Dept: PULMONOLOGY | Facility: CLINIC | Age: 79
End: 2024-11-06

## 2024-11-06 DIAGNOSIS — G47.33 OBSTRUCTIVE SLEEP APNEA, ADULT: ICD-10-CM

## 2024-11-06 DIAGNOSIS — G47.33 OSA (OBSTRUCTIVE SLEEP APNEA): Primary | ICD-10-CM

## 2024-11-06 NOTE — TELEPHONE ENCOUNTER
Mariano are you able to put in a new order or we need to see the patient again. Pt was last seen on 10/22

## 2024-11-06 NOTE — TELEPHONE ENCOUNTER
Caller: Rafaela Arellano    Relationship: Emergency Contact    Best call back number: [phone]    What is the best time to reach you: ANYTIME   Who are you requesting to speak with (clinical staff, provider,  specific staff member): CLINICAL STAFF / PROVIDER     What was the call regarding: PATIENT'S SPOUSE CALLED STATING PATIENT WENT TO  [facility] AND WAS TOLD HE IS ELIGIBLE FOR A NEW SLEEP MACHINE BUT NEEDS A PRESCRIPTION FROM PROVIDER IN ORDER TO RECEIVE IT. THE PLACE FOR SLEEP MACHINE IS CALLED [facility].      Left arm;

## 2025-04-15 ENCOUNTER — OFFICE VISIT (OUTPATIENT)
Dept: SLEEP MEDICINE | Age: 80
End: 2025-04-15
Payer: MEDICARE

## 2025-04-15 VITALS
HEIGHT: 75 IN | DIASTOLIC BLOOD PRESSURE: 72 MMHG | HEART RATE: 81 BPM | OXYGEN SATURATION: 96 % | TEMPERATURE: 98.5 F | SYSTOLIC BLOOD PRESSURE: 130 MMHG | BODY MASS INDEX: 35.06 KG/M2 | WEIGHT: 282 LBS

## 2025-04-15 DIAGNOSIS — G47.30 HYPERSOMNIA WITH SLEEP APNEA: ICD-10-CM

## 2025-04-15 DIAGNOSIS — G47.33 OSA (OBSTRUCTIVE SLEEP APNEA): Primary | ICD-10-CM

## 2025-04-15 DIAGNOSIS — G47.10 HYPERSOMNIA WITH SLEEP APNEA: ICD-10-CM

## 2025-04-15 DIAGNOSIS — E66.01 MORBID (SEVERE) OBESITY DUE TO EXCESS CALORIES: ICD-10-CM

## 2025-04-15 NOTE — PROGRESS NOTES
Sleep Clinic Follow Up Note    Chief Complaint  Sleep Apnea, Non-restorative Sleep, and Daytime Sleepiness    Subjective     History of Present Illness (from previous encounter on 10/22/2024):  Favian Arellano is a 79 y.o. male who returns for follow-up and compliance of PAP therapy.  The Pap report has been reviewed.  Overall usage is at 100% with compliance at 99%.  The patient averages 10 hours and 20 minutes of therapy.  Sleep apnea is well-controlled with an AHI of 2.2/H. I will refill the patient's supplies, and I have asked them to return for follow-up and compliance in 1 year or sooner should they have further questions or concerns. He has had episodes of jerking and elbowing his wife. This does not appear to be related to a sleep behavior. He did not try melatonin for a long time. (End copied text).    Interval History:  Favian Arellano is a 79 y.o. male returns for follow up and compliance of PAP therapy. The patient was last seen on 10/22/2024 by me. Overall the patient feels good with regard to therapy. The device appears to be working appropriately. On average the patient sleeps 8-10 hours per night. The patient wakes 0-1 times per night.  Patient reports that he remains fatigued during the day.  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    The patient reports the following changes to their medical and medication history since they were last seen:  None    Further details are as follows:      Ramseur Scale is (out of 24): Total score: 16     Weight:  Current Weight: 282 lb        The patient's relevant past medical, surgical, family, and social history reviewed and updated in Epic as appropriate.    PMH:    Past Medical History:   Diagnosis Date    Benign hypertension     History of eye surgery     Lower extremity edema     Mild obesity     PARKER (obstructive sleep apnea)     requiring CPAP    Osteoarthritis     Paroxysmal atrial fibrillation     Tachy-krishna  "syndrome      Past Surgical History:   Procedure Laterality Date    CARDIOVERSION      HERNIA REPAIR      INCISION AND DRAINAGE LEG Right 8/18/2017    Procedure: INCISION AND DRAINAGE RIGHT KNEE;  Surgeon: Francisco Macias MD;  Location: [facility];  Service:     JOINT REPLACEMENT      NEPHROLITHOTRIPSY PERCUTANEOUS         Allergies   Allergen Reactions    Naproxen      Patient states he has made changes on that       MEDS:  Prior to Admission medications    Medication Sig Start Date End Date Taking? Authorizing Provider   Cetirizine HCl (ZyrTEC Allergy) 10 MG capsule Take 10 mg by mouth Daily. 5/17/23   Fatoumata Boateng MD   NON FORMULARY Take 1 capsule by mouth 2 (Two) Times a Day. VITAMINS A,C,E-ZINC-COPPER (ICAPS AREDS) 14,320-226-200 UNIT-MG-UNIT CAP    Fatoumata Boateng MD   oxybutynin (DITROPAN) 5 MG tablet Take 2 tablets by mouth Daily. 5/17/23   Fatoumata Boateng MD   rivaroxaban (Xarelto) 20 MG tablet Take 1 tablet by mouth Daily. 5/29/24   Marcela Soto PA-C   sertraline (ZOLOFT) 50 MG tablet Take 1 tablet by mouth Daily.    Fatoumata Boateng MD   torsemide (DEMADEX) 20 MG tablet Take 1 tablet by mouth Daily. 5/29/24   Marcela Soto PA-C         FH:  Family History   Problem Relation Age of Onset    Hypertension Other     Cancer Sister     Alzheimer's disease Mother        Objective   Vital Signs:  /72   Pulse 81   Temp 98.5 °F (36.9 °C) (Temporal)   Ht 190.5 cm (75\")   Wt 128 kg (282 lb)   SpO2 96%   BMI 35.25 kg/m²     Patient's (Body mass index is 35.25 kg/m².) indicates that they are obese (BMI >30)           Physical Exam  Vitals reviewed.   Constitutional:       Appearance: Normal appearance.   HENT:      Head: Normocephalic and atraumatic.      Nose: Nose normal.      Mouth/Throat:      Mouth: Mucous membranes are moist.   Cardiovascular:      Rate and Rhythm: Normal rate and regular rhythm.      Heart sounds: No murmur heard.     No friction rub. No gallop. "   Pulmonary:      Effort: Pulmonary effort is normal. No respiratory distress.      Breath sounds: Normal breath sounds. No wheezing or rhonchi.   Neurological:      Mental Status: He is alert and oriented to person, place, and time.   Psychiatric:         Behavior: Behavior normal.               Result Review :           PAP Report:  AHI: 2.1/h  Days of Usage: 30/30 (100%)  Number of Days Greater than 4 hours: 100%  Average time (days used): 9 hours 21 minutes  95th Percentile Pressure: 12.6 cmH2O  95th percentile leaks: 17.4 L/min  Settings: Auto CPAP-4/20 cm H2O, EPR full-time, EPR level 3, response standard.       Assessment and Plan  Favian Arellano is a 79 y.o. male who returns for follow-up and compliance of PAP therapy.  The Pap report has been reviewed.  Overall usage and compliance are at 100%.  The patient averaged 9 hours and 21 minutes of therapy.  Sleep apnea is well-controlled with AHI of 2.1/h.     I will refill the patient's supplies, and I have asked them to return for follow-up and compliance in 1 year or sooner should they have further questions or concerns.    He continues to have difficulty with hypersomnia despite excellent treatment of sleep apnea.  I note the patient has multiple comorbidities that can contribute to his hypersomnia.  I also note that there are multiple medications that can contribute to this as well.  With the patient's cardiac history I do not think treatment with a stimulant medication would be of benefit in this instance.  Patient asks if he might have a neuromuscular disorder such as Parkinson's disease.  He is going to see his primary care physician next week and discuss this further.  I note that he would have to see neurology for a definitive diagnosis.    Diagnoses and all orders for this visit:    1. PARKER (obstructive sleep apnea) (Primary)  -     PAP Therapy    2. Hypersomnia with sleep apnea    3. Morbid (severe) obesity due to excess calories         The  patient continues to use and benefit from PAP therapy.    1. The patient was counseled regarding multimodal approach with healthy nutrition, healthy sleep, regular physical activity, social activities, counseling, and medications. Encouraged to practice lateral sleep position. Avoid alcohol and sedatives close to bedtime.     2.  We will refill supplies x1 year.  Return to clinic 1 year or sooner if symptoms warrant. I have reviewed the results of my evaluation and impression and discussed my recommendations in detail with the patient.           Follow Up  Return in about 1 year (around 4/15/2026).  Patient was given instructions and counseling regarding his condition or for health maintenance advice. Please see specific information pulled into the AVS if appropriate.       LUCAS Bolaños, ACNP-BC  Pulmonology, Critical Care, and Sleep Medicine

## 2025-05-19 NOTE — PROGRESS NOTES
[facility] Cardiology    Date: 2025    Patient ID: Favian Arellano is a 80 y.o. male   : 1945   Contact: [phone]         Chief Complaint:    Chief Complaint   Patient presents with    Permanent atrial fibrillation       Problem List:  Chronic atrial fibrillation/tachybradycardia syndrome  Diagnosed 4 years ago with external cardioversion x3.  CHADS-Vasc = 2 (HTN, Age).  On Xarelto.  Failed multaq and Sotalol  Asymptomatic  Opted for rate-control but has not required AV nodals   Echo, 10/26/2023: EF 55%. Trace MR. Trace to mild TR with RVSP 17 mmHg.   Hypertension.   Lower extremity edema.   PARKER requiring CPAP.   Osteoarthritis.   Family history of hypertension.   Right knee subcutaneous hematoma/seroma with skin necrosis. S/P debridement and partial closure.   Surgical history:   Lithotripsy.      Allergies   Allergen Reactions    Naproxen      Patient states he has made changes on that         Current Outpatient Medications:     Cetirizine HCl (ZyrTEC Allergy) 10 MG capsule, Take 10 mg by mouth Daily., Disp: , Rfl:     NON FORMULARY, Take 1 capsule by mouth 2 (Two) Times a Day. VITAMINS A,C,E-ZINC-COPPER (ICAPS AREDS) 14,320226-200 UNIT-MG-UNIT CAP, Disp: , Rfl:     oxybutynin (DITROPAN) 5 MG tablet, Take 2 tablets by mouth Daily., Disp: , Rfl:     rivaroxaban (Xarelto) 20 MG tablet, Take 1 tablet by mouth Daily., Disp: 90 tablet, Rfl: 3    sertraline (ZOLOFT) 50 MG tablet, Take 1 tablet by mouth Daily., Disp: , Rfl:     torsemide (DEMADEX) 20 MG tablet, Take 1 tablet by mouth Daily., Disp: 90 tablet, Rfl: 3     History of Present Illness: Favian Arellano is a 80 y.o. male who is here today for annual follow-up for tachybradycardia syndrome, permanent atrial fibrillation and hypertension.  Patient overall has been doing well.  He denies any significant palpitations, chest pain or shortness of breath.  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  He does complain and his wife complains of bleeding with any little thing that comes into contact with his skin.  They also state that his Xarelto has become more expensive.      The following portions of the patient's history were reviewed and updated as appropriate: allergies, current medications and problem list.     Pertinent positives as listed in the HPI.  All other systems reviewed are negative.            Vitals:    05/20/25 1307   BP: 112/62   BP Location: Right arm   Patient Position: Sitting   Cuff Size: Adult   Pulse: 82   SpO2: 95%   Weight: 128 kg (281 lb 9.6 oz)   Height: 190.5 cm (75\")     Body mass index is 35.2 kg/m².    Physical Exam:  General: Alert and oriented.  Neck: Jugular venous pressure is within normal limits. Carotids have normal upstrokes without bruits.   Cardiovascular: Regular rate and rhythm. No murmur, gallop or rub.  Lungs: Clear, no rales or wheezes. Equal expansion is noted.   Extremities: Trace bilateral lower extremity edema.  Skin: Warm and dry.  Significant bruising to upper extremities.  Neurologic: Nonfocal.     Diagnostic data (reviewed with patient):  CMP:   Lab Results   Component Value Date    GLUCOSE 109 (H) 01/07/2024    BUN 12 01/07/2024    CREATININE 0.77 01/07/2024    EGFRIFNONA 96 08/01/2021    BCR 15.6 01/07/2024    K 3.8 01/07/2024    CO2 33.0 (H) 01/07/2024    CALCIUM 8.7 01/07/2024    ALBUMIN 2.9 01/06/2024    AST 26 01/03/2024    ALT 11 01/03/2024       CBC:    Lab Results   Component Value Date    WBC 7.74 01/07/2024    HGB 14.4 01/07/2024    HCT 41.3 01/07/2024    MCV 94.3 01/07/2024     01/07/2024       HgbA1c:    Lab Results   Component Value Date    HGBA1C 4.80 01/03/2024       Advance Care Planning   ACP discussion was declined by the patient. Patient does not have an advance directive, declines further assistance.            Assessment:  1. Tachy-krishna syndrome    2. Permanent atrial fibrillation    3. Essential hypertension    4. " Lower extremity edema             Plan:  Stable cardiac status.  Continue torsemide 20 mg daily for lower extremity swelling.  I had a long discussion with the patient regarding his bleeding and bruising.  I did discuss the possibility of doing a left atrial appendage occlusive device given that easy bleeding and bruising as well as cost of the Xarelto.  At this time the patient does not want to undergo any further procedures but will keep it in mind for the future.  Continue all other current medications.  F/up in 12 months, sooner if needed.    Marcela Soto PA-C

## 2025-05-20 ENCOUNTER — OFFICE VISIT (OUTPATIENT)
Dept: CARDIOLOGY | Facility: CLINIC | Age: 80
End: 2025-05-20
Payer: MEDICARE

## 2025-05-20 VITALS
BODY MASS INDEX: 35.01 KG/M2 | DIASTOLIC BLOOD PRESSURE: 62 MMHG | OXYGEN SATURATION: 95 % | WEIGHT: 281.6 LBS | HEART RATE: 82 BPM | SYSTOLIC BLOOD PRESSURE: 112 MMHG | HEIGHT: 75 IN

## 2025-05-20 DIAGNOSIS — I49.5 TACHY-BRADY SYNDROME: Primary | ICD-10-CM

## 2025-05-20 DIAGNOSIS — R60.0 LOWER EXTREMITY EDEMA: ICD-10-CM

## 2025-05-20 DIAGNOSIS — I48.21 PERMANENT ATRIAL FIBRILLATION: ICD-10-CM

## 2025-05-20 DIAGNOSIS — I10 ESSENTIAL HYPERTENSION: ICD-10-CM

## 2025-05-20 PROCEDURE — 3074F SYST BP LT 130 MM HG: CPT | Performed by: PHYSICIAN ASSISTANT

## 2025-05-20 PROCEDURE — 99214 OFFICE O/P EST MOD 30 MIN: CPT | Performed by: PHYSICIAN ASSISTANT

## 2025-05-20 PROCEDURE — 3078F DIAST BP <80 MM HG: CPT | Performed by: PHYSICIAN ASSISTANT

## 2025-06-11 ENCOUNTER — RESULTS FOLLOW-UP (OUTPATIENT)
Dept: CARDIOLOGY | Facility: CLINIC | Age: 80
End: 2025-06-11
Payer: MEDICARE

## 2025-06-11 NOTE — TELEPHONE ENCOUNTER
Per JAYDEN Jordan - Please call patient and let him know his blood work was reviewed and his cholesterol is to goal. No changes need to be made at this time.   LM for patient to return call.

## 2025-06-30 RX ORDER — TORSEMIDE 20 MG/1
20 TABLET ORAL DAILY
Qty: 90 TABLET | Refills: 3 | Status: SHIPPED | OUTPATIENT
Start: 2025-06-30

## (undated) DEVICE — CVR HNDL LT SURG ACCSSRY BLU STRL

## (undated) DEVICE — ANTIBACTERIAL UNDYED BRAIDED (POLYGLACTIN 910), SYNTHETIC ABSORBABLE SUTURE: Brand: COATED VICRYL

## (undated) DEVICE — KT DRN EVAC WND PVC PCH WTROC RND 10F400

## (undated) DEVICE — GAUZE FLUFF 1 PLY: Brand: DEROYAL

## (undated) DEVICE — DRSNG GZ CURAD XEROFORM NONADHR OVERWRAP 5X9IN

## (undated) DEVICE — Y-TYPE TUR/BLADDER IRRIGATION SET, REGULATING CLAMP

## (undated) DEVICE — CANN NASL CO2 DIVIDED A/

## (undated) DEVICE — MEDI-VAC YANKAUER SUCTION HANDLE W/BULBOUS TIP: Brand: CARDINAL HEALTH

## (undated) DEVICE — SPNG GZ WOVN 4X4IN 12PLY 10/BX STRL

## (undated) DEVICE — BNDG ELAS ELITE V/CLOSE 4IN 5YD LF STRL

## (undated) DEVICE — GLV SURG TRIUMPH ORTHO W/ALOE PF LTX 7.5 STRL

## (undated) DEVICE — GLV SURG DERMASSURE GRN LF PF 8.0

## (undated) DEVICE — UNDERCAST PADDING: Brand: DEROYAL

## (undated) DEVICE — DRSNG WND GZ CURAD OIL EMULSION 3X3IN STRL

## (undated) DEVICE — 2DE14 2-0 UNDYD MONODERM 30X30: Brand: 2DE14 2-0 UNDYD MONODERM 30X30

## (undated) DEVICE — PAD ARMBRD SURG CONVOL 7.5X20X2IN

## (undated) DEVICE — BANDAGE,GAUZE,BULKEE II,4.5"X4.1YD,STRL: Brand: MEDLINE

## (undated) DEVICE — APPL CHLORAPREP W/TINT 26ML BLU

## (undated) DEVICE — DRSNG PAD ABD 8X10IN STRL

## (undated) DEVICE — MEDI-VAC NON-CONDUCTIVE SUCTION TUBING: Brand: CARDINAL HEALTH

## (undated) DEVICE — SOL LR 1000ML

## (undated) DEVICE — CONTAINER,SPECIMEN,OR STERILE,4OZ: Brand: MEDLINE

## (undated) DEVICE — PK EXTREM LOWR 10

## (undated) DEVICE — HOLDER: Brand: DEROYAL

## (undated) DEVICE — ADAPT ST INFUS ADMIN SYR 70IN